# Patient Record
Sex: MALE | Race: OTHER | HISPANIC OR LATINO | URBAN - METROPOLITAN AREA
[De-identification: names, ages, dates, MRNs, and addresses within clinical notes are randomized per-mention and may not be internally consistent; named-entity substitution may affect disease eponyms.]

---

## 2022-05-04 ENCOUNTER — INPATIENT (INPATIENT)
Facility: HOSPITAL | Age: 72
LOS: 2 days | Discharge: HOME | DRG: 379 | End: 2022-05-07
Attending: INTERNAL MEDICINE | Admitting: INTERNAL MEDICINE
Payer: COMMERCIAL

## 2022-05-04 VITALS
SYSTOLIC BLOOD PRESSURE: 191 MMHG | WEIGHT: 145.51 LBS | DIASTOLIC BLOOD PRESSURE: 89 MMHG | RESPIRATION RATE: 17 BRPM | HEIGHT: 64 IN | TEMPERATURE: 98 F | HEART RATE: 68 BPM | OXYGEN SATURATION: 97 %

## 2022-05-04 DIAGNOSIS — K92.2 GASTROINTESTINAL HEMORRHAGE, UNSPECIFIED: ICD-10-CM

## 2022-05-04 DIAGNOSIS — Z29.9 ENCOUNTER FOR PROPHYLACTIC MEASURES, UNSPECIFIED: ICD-10-CM

## 2022-05-04 DIAGNOSIS — D50.9 IRON DEFICIENCY ANEMIA, UNSPECIFIED: ICD-10-CM

## 2022-05-04 DIAGNOSIS — Z90.49 ACQUIRED ABSENCE OF OTHER SPECIFIED PARTS OF DIGESTIVE TRACT: Chronic | ICD-10-CM

## 2022-05-04 DIAGNOSIS — I10 ESSENTIAL (PRIMARY) HYPERTENSION: ICD-10-CM

## 2022-05-04 DIAGNOSIS — Z90.3 ACQUIRED ABSENCE OF STOMACH [PART OF]: Chronic | ICD-10-CM

## 2022-05-04 DIAGNOSIS — Z98.0 INTESTINAL BYPASS AND ANASTOMOSIS STATUS: Chronic | ICD-10-CM

## 2022-05-04 LAB
ALBUMIN SERPL ELPH-MCNC: 3.5 G/DL — SIGNIFICANT CHANGE UP (ref 3.5–5)
ALP SERPL-CCNC: 95 U/L — SIGNIFICANT CHANGE UP (ref 40–120)
ALT FLD-CCNC: 22 U/L DA — SIGNIFICANT CHANGE UP (ref 10–60)
ANION GAP SERPL CALC-SCNC: 4 MMOL/L — LOW (ref 5–17)
AST SERPL-CCNC: 16 U/L — SIGNIFICANT CHANGE UP (ref 10–40)
BASOPHILS # BLD AUTO: 0.07 K/UL — SIGNIFICANT CHANGE UP (ref 0–0.2)
BASOPHILS NFR BLD AUTO: 1 % — SIGNIFICANT CHANGE UP (ref 0–2)
BILIRUB SERPL-MCNC: 0.2 MG/DL — SIGNIFICANT CHANGE UP (ref 0.2–1.2)
BLD GP AB SCN SERPL QL: SIGNIFICANT CHANGE UP
BUN SERPL-MCNC: 26 MG/DL — HIGH (ref 7–18)
CALCIUM SERPL-MCNC: 8.3 MG/DL — LOW (ref 8.4–10.5)
CHLORIDE SERPL-SCNC: 107 MMOL/L — SIGNIFICANT CHANGE UP (ref 96–108)
CO2 SERPL-SCNC: 29 MMOL/L — SIGNIFICANT CHANGE UP (ref 22–31)
CREAT SERPL-MCNC: 1.02 MG/DL — SIGNIFICANT CHANGE UP (ref 0.5–1.3)
EGFR: 79 ML/MIN/1.73M2 — SIGNIFICANT CHANGE UP
EOSINOPHIL # BLD AUTO: 0.11 K/UL — SIGNIFICANT CHANGE UP (ref 0–0.5)
EOSINOPHIL NFR BLD AUTO: 1.6 % — SIGNIFICANT CHANGE UP (ref 0–6)
GLUCOSE SERPL-MCNC: 117 MG/DL — HIGH (ref 70–99)
HCT VFR BLD CALC: 41.4 % — SIGNIFICANT CHANGE UP (ref 39–50)
HGB BLD-MCNC: 13.6 G/DL — SIGNIFICANT CHANGE UP (ref 13–17)
IMM GRANULOCYTES NFR BLD AUTO: 1.3 % — SIGNIFICANT CHANGE UP (ref 0–1.5)
LIDOCAIN IGE QN: 190 U/L — SIGNIFICANT CHANGE UP (ref 73–393)
LYMPHOCYTES # BLD AUTO: 1.39 K/UL — SIGNIFICANT CHANGE UP (ref 1–3.3)
LYMPHOCYTES # BLD AUTO: 20.3 % — SIGNIFICANT CHANGE UP (ref 13–44)
MCHC RBC-ENTMCNC: 27.6 PG — SIGNIFICANT CHANGE UP (ref 27–34)
MCHC RBC-ENTMCNC: 32.9 GM/DL — SIGNIFICANT CHANGE UP (ref 32–36)
MCV RBC AUTO: 84 FL — SIGNIFICANT CHANGE UP (ref 80–100)
MONOCYTES # BLD AUTO: 0.5 K/UL — SIGNIFICANT CHANGE UP (ref 0–0.9)
MONOCYTES NFR BLD AUTO: 7.3 % — SIGNIFICANT CHANGE UP (ref 2–14)
NEUTROPHILS # BLD AUTO: 4.69 K/UL — SIGNIFICANT CHANGE UP (ref 1.8–7.4)
NEUTROPHILS NFR BLD AUTO: 68.5 % — SIGNIFICANT CHANGE UP (ref 43–77)
NRBC # BLD: 0 /100 WBCS — SIGNIFICANT CHANGE UP (ref 0–0)
PLATELET # BLD AUTO: 235 K/UL — SIGNIFICANT CHANGE UP (ref 150–400)
POTASSIUM SERPL-MCNC: 4.1 MMOL/L — SIGNIFICANT CHANGE UP (ref 3.5–5.3)
POTASSIUM SERPL-SCNC: 4.1 MMOL/L — SIGNIFICANT CHANGE UP (ref 3.5–5.3)
PROT SERPL-MCNC: 7.1 G/DL — SIGNIFICANT CHANGE UP (ref 6–8.3)
RBC # BLD: 4.93 M/UL — SIGNIFICANT CHANGE UP (ref 4.2–5.8)
RBC # FLD: 19.1 % — HIGH (ref 10.3–14.5)
SARS-COV-2 RNA SPEC QL NAA+PROBE: SIGNIFICANT CHANGE UP
SODIUM SERPL-SCNC: 140 MMOL/L — SIGNIFICANT CHANGE UP (ref 135–145)
TROPONIN I, HIGH SENSITIVITY RESULT: 4.3 NG/L — SIGNIFICANT CHANGE UP
WBC # BLD: 6.85 K/UL — SIGNIFICANT CHANGE UP (ref 3.8–10.5)
WBC # FLD AUTO: 6.85 K/UL — SIGNIFICANT CHANGE UP (ref 3.8–10.5)

## 2022-05-04 PROCEDURE — 99285 EMERGENCY DEPT VISIT HI MDM: CPT

## 2022-05-04 PROCEDURE — 99222 1ST HOSP IP/OBS MODERATE 55: CPT

## 2022-05-04 RX ORDER — PANTOPRAZOLE SODIUM 20 MG/1
40 TABLET, DELAYED RELEASE ORAL DAILY
Refills: 0 | Status: DISCONTINUED | OUTPATIENT
Start: 2022-05-04 | End: 2022-05-05

## 2022-05-04 RX ORDER — FERROUS SULFATE 325(65) MG
325 TABLET ORAL DAILY
Refills: 0 | Status: DISCONTINUED | OUTPATIENT
Start: 2022-05-04 | End: 2022-05-07

## 2022-05-04 RX ORDER — ONDANSETRON 8 MG/1
8 TABLET, FILM COATED ORAL EVERY 8 HOURS
Refills: 0 | Status: DISCONTINUED | OUTPATIENT
Start: 2022-05-04 | End: 2022-05-07

## 2022-05-04 RX ORDER — SENNA PLUS 8.6 MG/1
2 TABLET ORAL AT BEDTIME
Refills: 0 | Status: DISCONTINUED | OUTPATIENT
Start: 2022-05-04 | End: 2022-05-07

## 2022-05-04 RX ORDER — POLYETHYLENE GLYCOL 3350 17 G/17G
17 POWDER, FOR SOLUTION ORAL DAILY
Refills: 0 | Status: DISCONTINUED | OUTPATIENT
Start: 2022-05-04 | End: 2022-05-07

## 2022-05-04 RX ADMIN — SENNA PLUS 2 TABLET(S): 8.6 TABLET ORAL at 22:06

## 2022-05-04 NOTE — CONSULT NOTE ADULT - ASSESSMENT
71M with PMHx of gastric ulcers s/p bilroth II gastrojejunostomy >41 y/o, hx of hospital admission for GI bleed ~10 years ago presenting with melena  h/h stable    -Recommend GI consultation  -PPI  -No acute surgical intervention warranted at this time  -Remainder of care per primary team

## 2022-05-04 NOTE — H&P ADULT - HISTORY OF PRESENT ILLNESS
Patient is a 70 y/o M w/ Hx of gastric ulcer (s/p partial gastrectomy and Bilroth II reconstruction in 1970s), VASILIY (received weekly IV Iron x 5), HTN (not on any meds), s/p appendectomy (1980s) and s/p hydrocele (1990s). He presented to the ED with melena x 4 days w/ assoc. nausea. He denies any vomiting, abdominal pain. He was recently seen by Gastroenterology (Dr. Srikanth Cooper) at [facility] last 4/25/22 for anemia work-up. EGD showed Arytenoid edema was found. Normal esophagus, small hiatal hernia. A fundoplication was found. The wrap appears loose. Patent Billroth II gastrectomy was found, characterized by erythema, friable mucosa a hemorrhage appearance, congestion, and edema. Treated with APC. Gastritis. Normal examined jejunum. cauterized. Colonoscopy showed Diverticulosis in the sigmoid colon and in the descending colon. He was subsequently admitted for GIB.

## 2022-05-04 NOTE — H&P ADULT - NSICDXPASTSURGICALHX_GEN_ALL_CORE_FT
PAST SURGICAL HISTORY:  History of appendectomy     History of Billroth II operation     History of partial gastrectomy

## 2022-05-04 NOTE — CONSULT NOTE ADULT - SUBJECTIVE AND OBJECTIVE BOX
[facility]    Patient is a 71y old  Male who presents with a chief complaint of melena.   HPI:  70 yo Male h/o Gastric Ulcer s/p partial gastrectomy and Billroth II reconstruction 45 yrs ago, Iron deficiency anemia on weekly IV iron infusions at [facility], new HTN not on anti-HTN meds,  who presented after  4 episodes of melena since last night. Pt also c/o nausea this AM. Pt recent had EGD for anemia 4/25 which showed edema and friable tissue to GJ anastamosis site. Pt was cauterized by GI Dr. Srikanth Cooper at the time. Pt relates h/o GI bleed requiring admission to [facility] 10 years ago. Denies any vomiting, fever, dizziness or weakness.  GI consulted for further evaluation of melena.     GI HPI:   Pt seen and examined in the ED.  Pt is Scottish speaking and prefers his daughter Michelle to translate . Pt endorses had black stool yesterday , then BRBPR this am. Pt endorses headache currently because he has not eaten since yesterday. Pt denies SOB, chest discomfort, N/V/D/C, abdominal discomfort, rectal discomfort.   EGD from 4/25/22:Impression:  Arytenoid edema was found. Normal esophagus, small hiatal hernia. A fundoplication was found. The wrap appears loose. Patent billroth II gastrectomy was found, characterized by erythema, friable mucosa a hemorrhage appearance, congestion, and edema. Treated with APC. Gastritis. Normal examined jejunum.   Colonoscopy 4/25/22: Impression: Diverticulosis in the sigmoid colon and in the descending colon. The examination was otherwise normal .    PMH/PSH:  PAST MEDICAL & SURGICAL HISTORY:  Gastric ulcer, s/p partial gastrectomy, Billroth II reconstruction 45 yrs ago.    HTN   s/p appendectomy 30 yrs ago         FH: no h/o cancer  FAMILY HISTORY:      Social History:   No ETOh, no smoking, no illicit drugs    MEDS:  MEDICATIONS  (STANDING):    MEDICATIONS  (PRN):    Allergies    No Known Allergies    Intolerances  ----------  ROS      CONSTITUTIONAL:  No weight loss, fever, chills, weakness or fatigue.  HEENT:  Eyes:  No visual loss, blurred vision, double vision or yellow sclerae. Ears, Nose, Throat:  No hearing loss, sneezing, congestion, runny nose or sore throat.  SKIN:  No rash or itching.  CARDIOVASCULAR:  No chest pain, chest pressure or chest discomfort. No palpitations or edema.  RESPIRATORY:  No shortness of breath, cough or sputum.  GASTROINTESTINAL:  SEE HPI  GENITOURINARY:  No dysuria, hematuria, urinary frequency  NEUROLOGICAL: transient  headache currently. No  dizziness, syncope, paralysis, ataxia, numbness or tingling in the extremities. No change in bowel or bladder control.  MUSCULOSKELETAL:  No muscle, back pain, joint pain or stiffness.  HEMATOLOGIC:  BRBPR ,   LYMPHATICS:  No enlarged nodes. No history of splenectomy.  PSYCHIATRIC:  No history of depression or anxiety.  ENDOCRINOLOGIC:  No reports of sweating, cold or heat intolerance. No polyuria or polydipsia.      ______________________________________________________________________  PHYSICAL EXAM:  T(C): 37 (05-04-22 @ 12:14), Max: 37 (05-04-22 @ 12:14)  HR: 60 (05-04-22 @ 12:14)  BP: 154/83 (05-04-22 @ 12:14)  RR: 17 (05-04-22 @ 12:14)  SpO2: 97% (05-04-22 @ 12:14)  Wt(kg): --      GEN: NAD, normocephalic  CVS: S1S2+  CHEST: clear to auscultation  ABD: soft , nontender, nondistended, bowel sounds present  EXTR: no cyanosis, no clubbing, no edema  NEURO: Awake and alert; oriented x3  SKIN:  warm;  non icteric    ______________________________________________________________________  LABS:                        13.6   6.85  )-----------( 235      ( 04 May 2022 09:39 )             41.4     05-04    140  |  107  |  26<H>  ----------------------------<  117<H>  4.1   |  29  |  1.02    Ca    8.3<L>      04 May 2022 09:39    TPro  7.1  /  Alb  3.5  /  TBili  0.2  /  DBili  x   /  AST  16  /  ALT  22  /  AlkPhos  95  05-04    LIVER FUNCTIONS - ( 04 May 2022 09:39 )  Alb: 3.5 g/dL / Pro: 7.1 g/dL / ALK PHOS: 95 U/L / ALT: 22 U/L DA / AST: 16 U/L / GGT: x             ____________________________________________    IMAGING:

## 2022-05-04 NOTE — H&P ADULT - PROBLEM SELECTOR PLAN 2
Hx of VASILIY  given 5 doses of weekly IV iron  Hgb/Hct on admission - 13.6/41.4  no signs of active bleeding  monitor CBC  Start FeSO4 tab daily  f/u FOBT  f/u anemia panel

## 2022-05-04 NOTE — CONSULT NOTE ADULT - NS ATTEND AMEND GEN_ALL_CORE FT
- Melena, GI bleed.  - History of billroth II.    Patient seen and examined. In short, pt with remote hx of billroth II for PUD, recent EGD with friable oozing GJ anastomosis, treated with APC now presenting with melena, multiple episodes starting yesterday. No anemia however unclear baseline Hb. Will plan tentatively for EGD tomorrow to evaluate. PPI IV BID. Regular diet ok today, NPO after midnight.

## 2022-05-04 NOTE — ED PROVIDER NOTE - CLINICAL SUMMARY MEDICAL DECISION MAKING FREE TEXT BOX
70 yo M h/o Partial Gastrectomy and Billroth II here for melena. Pt clinically stable. Will send labs to assess for anemia and contact pt outpatient GI.

## 2022-05-04 NOTE — ED ADULT TRIAGE NOTE - CHIEF COMPLAINT QUOTE
Rectal bleeding ,bloody stool since yesterday ,h/o stomach surgery(About 70% stomach removed at the time)in 1970s

## 2022-05-04 NOTE — H&P ADULT - PROBLEM SELECTOR PLAN 1
p/w melena x 4 days  Hx of gastric ulcer (s/p partial gastrectomy, Billroth II reconstruction)  EGD (4/25) - edema, friable tissue at GI anastomosis, cauterized  Colon (4/25) - diverticulosis, sigmoid  start on clear liquids  Protonix IV OD  f/u FOBT  Gastro (Dr. Aragon) consulted  Surgery consulted - no acute intervention p/w melena x 4 days  Hx of gastric ulcer (s/p partial gastrectomy, Billroth II reconstruction)  EGD (4/25) - edema, friable tissue at GI anastomosis, cauterized  Colon (4/25) - diverticulosis, sigmoid  start on clear liquids  Protonix IV OD  f/u FOBT  for EGD daysi (5/5/22)  Gastro (Dr. Aragon) consulted  Surgery consulted - no acute intervention

## 2022-05-04 NOTE — H&P ADULT - NSHPREVIEWOFSYSTEMS_GEN_ALL_CORE
- CONSTITUTIONAL: Denies fever and chills  - HEENT: Denies changes in vision and hearing.  - RESPIRATORY: Denies SOB and cough.  - CV: Denies chest pain and palpitations  - GI: (+) melena. Denies abdominal pain, nausea, vomiting and diarrhea.  - : Denies dysuria and urinary frequency.  - SKIN: Denies rash and pruritus.  - NEUROLOGICAL: Denies headache and syncope.  - PSYCHIATRIC: Denies recent changes in mood. Denies anxiety and depression.

## 2022-05-04 NOTE — ED ADULT NURSE NOTE - PAIN RATING/NUMBER SCALE (0-10): REST
PT FELL YESTERDAY AND WAS SEEN HERE. THE FAMILY BROUGHT HIM IN FOR RE EVAL OF THE L EYE WHICH IS MORE SWOLLEN AND BRUISED. PT IS ACTING NORMAL AND NO VOMITING.
0

## 2022-05-04 NOTE — ED PROVIDER NOTE - OBJECTIVE STATEMENT
70 yo Male h/o Gastric Ulcer s/p partial gastrectomy and Billroth II reconstruction here for 4 episodes of melena since last night. Pt also c/o nausea this AM. Pt recent had EGD for anemia 4/25 which showed edema and friable tissue to GJ anastamosis site. Pt was cauterized by GI Dr. Srikanth Cooper at the time. Pt relates h/o GI bleed requiring admission to [facility] 10 years ago. Denies any vomiting, fever, dizziness or weakness.

## 2022-05-04 NOTE — H&P ADULT - NSHPPHYSICALEXAM_GEN_ALL_CORE
Vital Signs  · Temp - 98.4F (36.9C)  · Heart Rate- 68  · BP - 191/89  · Respiration Rate - 17  · SpO2 (%) - 97    PHYSICAL EXAM:  GENERAL: NAD, speaks in full sentences, no signs of respiratory distress  HEAD:  Atraumatic, Normocephalic  EYES: EOMI, PERRLA, conjunctiva and sclera clear  NECK: Supple, No JVD  CHEST/LUNG: Clear to auscultation bilaterally; No wheeze; No crackles; No accessory muscles used  HEART: Regular rate and rhythm; No murmurs;   ABDOMEN: Soft, Nontender, Nondistended; Bowel sounds present; No guarding  EXTREMITIES:  2+ Peripheral Pulses, No cyanosis or edema  PSYCH: AAOx3  NEUROLOGY: non-focal  SKIN: No rashes or lesions

## 2022-05-04 NOTE — CONSULT NOTE ADULT - PROBLEM SELECTOR RECOMMENDATION 9
Hgb >13  C/w PPI  Clear liquid today  Avoid NSAIDs  Monitor CBC and transfuse for Hgb <7.0 Hgb >13  C/w PPI  Clear liquid today  EGD tomorrow  NPO after midnight  COVID test within 72 hrs.   Avoid NSAIDs  Monitor CBC and transfuse for Hgb <7.0

## 2022-05-04 NOTE — PATIENT PROFILE ADULT - FUNCTIONAL ASSESSMENT - DAILY ACTIVITY ASSESSMENT TYPE
Patient is scheduledÂ with Dr. Bogdan Elaine on 12/10Â at 10am arrive at [address] [facility], here at [facility] on the 1st floor at Same Day Interventional Service. Patient expressed understanding and all scheduling questions answered. Â   Â   Pre-procedural Clearance;  [place]. Neha [place] [facility] [place]: [phone]    COVID Testin/8 at 10:40am  Location: [address] ([address] [place] [address]) Admission

## 2022-05-04 NOTE — H&P ADULT - ASSESSMENT
Patient is a 70 y/o M w/ Hx of gastric ulcer (s/p partial gastrectomy and Bilroth II reconstruction in 1970s), VASILIY (received weekly IV Iron x 5), HTN (not on any meds), s/p appendectomy (1980s) and s/p hydrocele (1990s). Admitted for Gastrointestinal Bleeding.

## 2022-05-04 NOTE — CONSULT NOTE ADULT - ASSESSMENT
70 yo Male h/o Gastric Ulcer s/p partial gastrectomy and Billroth II reconstruction 45 yrs ago, Iron deficiency anemia on weekly IV iron infusions at [facility], new HTN not on anti-HTN meds,  who presented after  4 episodes of melena .

## 2022-05-04 NOTE — CONSULT NOTE ADULT - SUBJECTIVE AND OBJECTIVE BOX
72 yo Male h/o Gastric Ulcer s/p partial gastrectomy and Billroth II reconstruction here for 4 episodes of melena since last night. Pt also c/o nausea this AM. Pt recent had EGD for anemia 4/25 which showed edema and friable tissue to GJ anastamosis site. Pt was cauterized by GI Dr. Srikanth Cooper at the time. Pt relates h/o GI bleed requiring admission to [facility] 10 years ago. Denies any vomiting, fever, dizziness or weakness.    SURGICAL CONSULTATION:  Pt seen and examined at bedside for surgical consult. Pt with hx of gastric ulcers s/p Billroth II gastrojejunostomy >40 years ago. Presents with melena x 1 day. He states his last bowel movement was dark, with minimal streaks of red blood.  Pt states last episode of gastric bleeding was 10 years ago. He states he has been receiving iron infusions due to anemia. He recently had EGD and colonoscopy done with Dr. Duke EGD showed erythema and friable tissue at the anastomosis site, hx of fundoplication and colonoscopy was incomplete however consistent with diverticulosis. Revision of Billroth surgery was recommended if persistent bleeding occurred. h/h stable in ED.   Pt offered no other acute complaints.     PAST MEDICAL & SURGICAL HISTORY:  No pertinent past medical history    Allergies  No Known Allergies  Intolerances    Vital Signs Last 24 Hrs  T(C): 37 (04 May 2022 12:14), Max: 37 (04 May 2022 12:14)  T(F): 98.6 (04 May 2022 12:14), Max: 98.6 (04 May 2022 12:14)  HR: 60 (04 May 2022 12:14) (60 - 68)  BP: 154/83 (04 May 2022 12:14) (154/83 - 191/89)  RR: 17 (04 May 2022 12:14) (17 - 17)  SpO2: 97% (04 May 2022 12:14) (97% - 97%)    Physical:  Gen: A&Ox3. NAD. Irish speaking  Chest: respiration unlabored  Abd: Soft ND, NT. Ex-lap scar and multiple other small scars noted from prior surgeries  Rectal: good sphincter tone, no external hemorrhoids. No masses or tenderness. Trace soft brown stool present in vault. No blood.     LABS:                        13.6   6.85  )-----------( 235      ( 04 May 2022 09:39 )             41.4              05-04    140  |  107  |  26<H>  ----------------------------<  117<H>  4.1   |  29  |  1.02    Ca    8.3<L>      04 May 2022 09:39    TPro  7.1  /  Alb  3.5  /  TBili  0.2  /  DBili  x   /  AST  16  /  ALT  22  /  AlkPhos  95  05-04

## 2022-05-05 DIAGNOSIS — B19.20 UNSPECIFIED VIRAL HEPATITIS C WITHOUT HEPATIC COMA: ICD-10-CM

## 2022-05-05 LAB
ALBUMIN SERPL ELPH-MCNC: 3.6 G/DL — SIGNIFICANT CHANGE UP (ref 3.5–5)
ALP SERPL-CCNC: 93 U/L — SIGNIFICANT CHANGE UP (ref 40–120)
ALT FLD-CCNC: 22 U/L DA — SIGNIFICANT CHANGE UP (ref 10–60)
ANION GAP SERPL CALC-SCNC: 4 MMOL/L — LOW (ref 5–17)
AST SERPL-CCNC: 16 U/L — SIGNIFICANT CHANGE UP (ref 10–40)
BASOPHILS # BLD AUTO: 0.04 K/UL — SIGNIFICANT CHANGE UP (ref 0–0.2)
BASOPHILS NFR BLD AUTO: 0.7 % — SIGNIFICANT CHANGE UP (ref 0–2)
BILIRUB SERPL-MCNC: 0.4 MG/DL — SIGNIFICANT CHANGE UP (ref 0.2–1.2)
BUN SERPL-MCNC: 24 MG/DL — HIGH (ref 7–18)
CALCIUM SERPL-MCNC: 9.2 MG/DL — SIGNIFICANT CHANGE UP (ref 8.4–10.5)
CHLORIDE SERPL-SCNC: 106 MMOL/L — SIGNIFICANT CHANGE UP (ref 96–108)
CO2 SERPL-SCNC: 32 MMOL/L — HIGH (ref 22–31)
CREAT SERPL-MCNC: 1 MG/DL — SIGNIFICANT CHANGE UP (ref 0.5–1.3)
EGFR: 80 ML/MIN/1.73M2 — SIGNIFICANT CHANGE UP
EOSINOPHIL # BLD AUTO: 0.15 K/UL — SIGNIFICANT CHANGE UP (ref 0–0.5)
EOSINOPHIL NFR BLD AUTO: 2.6 % — SIGNIFICANT CHANGE UP (ref 0–6)
FERRITIN SERPL-MCNC: 265 NG/ML — SIGNIFICANT CHANGE UP (ref 30–400)
GLUCOSE SERPL-MCNC: 110 MG/DL — HIGH (ref 70–99)
HAPTOGLOB SERPL-MCNC: 138 MG/DL — SIGNIFICANT CHANGE UP (ref 34–200)
HCT VFR BLD CALC: 41.4 % — SIGNIFICANT CHANGE UP (ref 39–50)
HCV AB S/CO SERPL IA: 16.03 S/CO — HIGH (ref 0–0.99)
HCV AB SERPL-IMP: REACTIVE
HGB BLD-MCNC: 13.5 G/DL — SIGNIFICANT CHANGE UP (ref 13–17)
IMM GRANULOCYTES NFR BLD AUTO: 1.4 % — SIGNIFICANT CHANGE UP (ref 0–1.5)
IRON SATN MFR SERPL: 124 UG/DL — SIGNIFICANT CHANGE UP (ref 65–170)
IRON SATN MFR SERPL: 36 % — SIGNIFICANT CHANGE UP (ref 20–55)
LDH SERPL L TO P-CCNC: 163 U/L — SIGNIFICANT CHANGE UP (ref 120–225)
LYMPHOCYTES # BLD AUTO: 1.12 K/UL — SIGNIFICANT CHANGE UP (ref 1–3.3)
LYMPHOCYTES # BLD AUTO: 19.6 % — SIGNIFICANT CHANGE UP (ref 13–44)
MAGNESIUM SERPL-MCNC: 2.7 MG/DL — HIGH (ref 1.6–2.6)
MCHC RBC-ENTMCNC: 27.8 PG — SIGNIFICANT CHANGE UP (ref 27–34)
MCHC RBC-ENTMCNC: 32.6 GM/DL — SIGNIFICANT CHANGE UP (ref 32–36)
MCV RBC AUTO: 85.2 FL — SIGNIFICANT CHANGE UP (ref 80–100)
MONOCYTES # BLD AUTO: 0.5 K/UL — SIGNIFICANT CHANGE UP (ref 0–0.9)
MONOCYTES NFR BLD AUTO: 8.8 % — SIGNIFICANT CHANGE UP (ref 2–14)
NEUTROPHILS # BLD AUTO: 3.82 K/UL — SIGNIFICANT CHANGE UP (ref 1.8–7.4)
NEUTROPHILS NFR BLD AUTO: 66.9 % — SIGNIFICANT CHANGE UP (ref 43–77)
NRBC # BLD: 0 /100 WBCS — SIGNIFICANT CHANGE UP (ref 0–0)
OB PNL STL: POSITIVE
PHOSPHATE SERPL-MCNC: 2.9 MG/DL — SIGNIFICANT CHANGE UP (ref 2.5–4.5)
PLATELET # BLD AUTO: 257 K/UL — SIGNIFICANT CHANGE UP (ref 150–400)
POTASSIUM SERPL-MCNC: 4.7 MMOL/L — SIGNIFICANT CHANGE UP (ref 3.5–5.3)
POTASSIUM SERPL-SCNC: 4.7 MMOL/L — SIGNIFICANT CHANGE UP (ref 3.5–5.3)
PROT SERPL-MCNC: 7 G/DL — SIGNIFICANT CHANGE UP (ref 6–8.3)
RBC # BLD: 4.86 M/UL — SIGNIFICANT CHANGE UP (ref 4.2–5.8)
RBC # BLD: 4.86 M/UL — SIGNIFICANT CHANGE UP (ref 4.2–5.8)
RBC # FLD: 19.5 % — HIGH (ref 10.3–14.5)
RETICS #: 99.6 K/UL — SIGNIFICANT CHANGE UP (ref 25–125)
RETICS/RBC NFR: 2.1 % — SIGNIFICANT CHANGE UP (ref 0.5–2.5)
SODIUM SERPL-SCNC: 142 MMOL/L — SIGNIFICANT CHANGE UP (ref 135–145)
TIBC SERPL-MCNC: 348 UG/DL — SIGNIFICANT CHANGE UP (ref 250–450)
UIBC SERPL-MCNC: 224 UG/DL — SIGNIFICANT CHANGE UP (ref 110–370)
WBC # BLD: 5.71 K/UL — SIGNIFICANT CHANGE UP (ref 3.8–10.5)
WBC # FLD AUTO: 5.71 K/UL — SIGNIFICANT CHANGE UP (ref 3.8–10.5)

## 2022-05-05 PROCEDURE — 76705 ECHO EXAM OF ABDOMEN: CPT | Mod: 26

## 2022-05-05 PROCEDURE — 43255 EGD CONTROL BLEEDING ANY: CPT

## 2022-05-05 DEVICE — CATH ESOPH DIL 9 ATM 6FR 8-10MM: Type: IMPLANTABLE DEVICE | Status: FUNCTIONAL

## 2022-05-05 DEVICE — CATH ESOPH DIL 8 ATM 6FR10-12M: Type: IMPLANTABLE DEVICE | Status: FUNCTIONAL

## 2022-05-05 DEVICE — CLIP RESOLUTION 360 235CM: Type: IMPLANTABLE DEVICE | Status: FUNCTIONAL

## 2022-05-05 DEVICE — CATH BALLOON DIL 6-8MM: Type: IMPLANTABLE DEVICE | Status: FUNCTIONAL

## 2022-05-05 RX ORDER — SUCRALFATE 1 G
1 TABLET ORAL
Refills: 0 | Status: DISCONTINUED | OUTPATIENT
Start: 2022-05-05 | End: 2022-05-07

## 2022-05-05 RX ORDER — PANTOPRAZOLE SODIUM 20 MG/1
40 TABLET, DELAYED RELEASE ORAL
Refills: 0 | Status: DISCONTINUED | OUTPATIENT
Start: 2022-05-05 | End: 2022-05-07

## 2022-05-05 RX ADMIN — Medication 1 GRAM(S): at 19:34

## 2022-05-05 RX ADMIN — PANTOPRAZOLE SODIUM 40 MILLIGRAM(S): 20 TABLET, DELAYED RELEASE ORAL at 19:33

## 2022-05-05 RX ADMIN — PANTOPRAZOLE SODIUM 40 MILLIGRAM(S): 20 TABLET, DELAYED RELEASE ORAL at 13:10

## 2022-05-05 RX ADMIN — Medication 1 GRAM(S): at 22:32

## 2022-05-05 NOTE — PROGRESS NOTE ADULT - PROBLEM SELECTOR PLAN 4
SCD for dvt ppx due to GIB p/w elevated BP - 191/89  no meds  started on Lisinopril 10 mg OD.  monitor BP

## 2022-05-05 NOTE — PROGRESS NOTE ADULT - PROBLEM SELECTOR PLAN 2
Pt with Hx of VASILIY  given 5 doses of weekly IV iron  Hgb/Hct on admission - 13.6/41.4  no signs of active bleeding  monitor CBC  Start FeSO4 tab daily  f/u FOBT  f/u anemia panel. Pt tested positive for Hep C S/CO ratio and Hep C virus interpretation  f/u hepatic/pancreatic US  LFT normal  Hepatology dr. Curry consulted.

## 2022-05-05 NOTE — PROGRESS NOTE ADULT - SUBJECTIVE AND OBJECTIVE BOX
NP Note discussed with Primary Attending.    Patient is a 71y old  Male who presents with a chief complaint of GI bleeding (04 May 2022 17:46)      INTERVAL HPI/OVERNIGHT EVENTS: no new complaints    MEDICATIONS  (STANDING):  ferrous    sulfate 325 milliGRAM(s) Oral daily  pantoprazole  Injectable 40 milliGRAM(s) IV Push daily  polyethylene glycol 3350 17 Gram(s) Oral daily  senna 2 Tablet(s) Oral at bedtime    MEDICATIONS  (PRN):  aluminum hydroxide/magnesium hydroxide/simethicone Suspension 30 milliLiter(s) Oral every 4 hours PRN Dyspepsia  ondansetron    Tablet 8 milliGRAM(s) Oral every 8 hours PRN Nausea and/or Vomiting      __________________________________________________  REVIEW OF SYSTEMS:    CONSTITUTIONAL: No fever,   EYES: no acute visual disturbances  NECK: No pain or stiffness  RESPIRATORY: No cough; No shortness of breath  CARDIOVASCULAR: No chest pain, no palpitations  GASTROINTESTINAL: No pain. No nausea or vomiting; No diarrhea   NEUROLOGICAL: No headache or numbness, no tremors  MUSCULOSKELETAL: No joint pain, no muscle pain  GENITOURINARY: no dysuria, no frequency, no hesitancy  PSYCHIATRY: no depression , no anxiety  ALL OTHER  ROS negative        Vital Signs Last 24 Hrs  T(C): 36.9 (05 May 2022 13:25), Max: 36.9 (04 May 2022 21:39)  T(F): 98.4 (05 May 2022 13:25), Max: 98.4 (04 May 2022 21:39)  HR: 62 (05 May 2022 13:25) (58 - 62)  BP: 117/69 (05 May 2022 13:25) (117/69 - 162/88)  BP(mean): --  RR: 18 (05 May 2022 13:25) (17 - 18)  SpO2: 98% (05 May 2022 13:25) (95% - 99%)    ________________________________________________  PHYSICAL EXAM:  GENERAL: NAD  HEENT: Normocephalic;  conjunctivae and sclerae clear; moist mucous membranes;   NECK : supple  CHEST/LUNG: Clear to auscultation bilaterally with good air entry   HEART: S1 S2  regular; no murmurs, gallops or rubs  ABDOMEN: Soft, Nontender, Nondistended; Bowel sounds present  EXTREMITIES: no cyanosis; no edema; no calf tenderness  SKIN: warm and dry; no rash  NERVOUS SYSTEM:  Awake and alert; Oriented  to place, person and time ; no new deficits    _________________________________________________  LABS:                        13.5   5.71  )-----------( 257      ( 05 May 2022 06:17 )             41.4     05-05    142  |  106  |  24<H>  ----------------------------<  110<H>  4.7   |  32<H>  |  1.00    Ca    9.2      05 May 2022 06:17  Phos  2.9     05-05  Mg     2.7     05-05    TPro  7.0  /  Alb  3.6  /  TBili  0.4  /  DBili  x   /  AST  16  /  ALT  22  /  AlkPhos  93  05-05        CAPILLARY BLOOD GLUCOSE            RADIOLOGY & ADDITIONAL TESTS:    Imaging  Reviewed:  YES/NO    Consultant(s) Notes Reviewed:   YES/ No      Plan of care was discussed with patient and /or primary care giver; all questions and concerns were addressed

## 2022-05-05 NOTE — PROGRESS NOTE ADULT - PROBLEM SELECTOR PLAN 3
p/w elevated BP - 191/89  no meds  started on Lisinopril 10 mg OD.  monitor BP Pt with Hx of VASILIY  given 5 doses of weekly IV iron  Hgb/Hct on admission - 13.6/41.4  no signs of active bleeding  monitor CBC  Start FeSO4 tab daily  f/u FOBT  f/u anemia panel.

## 2022-05-06 LAB
ALBUMIN SERPL ELPH-MCNC: 3.1 G/DL — LOW (ref 3.5–5)
ALP SERPL-CCNC: 90 U/L — SIGNIFICANT CHANGE UP (ref 40–120)
ALT FLD-CCNC: 19 U/L DA — SIGNIFICANT CHANGE UP (ref 10–60)
ANION GAP SERPL CALC-SCNC: 4 MMOL/L — LOW (ref 5–17)
APTT BLD: 29.5 SEC — SIGNIFICANT CHANGE UP (ref 27.5–35.5)
AST SERPL-CCNC: 12 U/L — SIGNIFICANT CHANGE UP (ref 10–40)
BILIRUB SERPL-MCNC: 0.4 MG/DL — SIGNIFICANT CHANGE UP (ref 0.2–1.2)
BUN SERPL-MCNC: 24 MG/DL — HIGH (ref 7–18)
CALCIUM SERPL-MCNC: 8.4 MG/DL — SIGNIFICANT CHANGE UP (ref 8.4–10.5)
CHLORIDE SERPL-SCNC: 109 MMOL/L — HIGH (ref 96–108)
CO2 SERPL-SCNC: 29 MMOL/L — SIGNIFICANT CHANGE UP (ref 22–31)
CREAT SERPL-MCNC: 0.98 MG/DL — SIGNIFICANT CHANGE UP (ref 0.5–1.3)
EGFR: 82 ML/MIN/1.73M2 — SIGNIFICANT CHANGE UP
GLUCOSE SERPL-MCNC: 101 MG/DL — HIGH (ref 70–99)
HAV IGG SER QL IA: REACTIVE
HBV CORE AB SER-ACNC: SIGNIFICANT CHANGE UP
HBV SURFACE AB SER-ACNC: SIGNIFICANT CHANGE UP
HCT VFR BLD CALC: 37.2 % — LOW (ref 39–50)
HCT VFR BLD CALC: 37.8 % — LOW (ref 39–50)
HCV RNA FLD QL NAA+PROBE: SIGNIFICANT CHANGE UP
HCV RNA FLD QL NAA+PROBE: SIGNIFICANT CHANGE UP
HCV RNA SPEC NAA+PROBE-LOG IU: SIGNIFICANT CHANGE UP
HCV RNA SPEC NAA+PROBE-LOG IU: SIGNIFICANT CHANGE UP LOGIU/ML
HCV RNA SPEC QL PROBE+SIG AMP: SIGNIFICANT CHANGE UP
HCV RNA SPEC QL PROBE+SIG AMP: SIGNIFICANT CHANGE UP
HGB BLD-MCNC: 12.2 G/DL — LOW (ref 13–17)
HGB BLD-MCNC: 12.5 G/DL — LOW (ref 13–17)
HIV 1+2 AB+HIV1 P24 AG SERPL QL IA: SIGNIFICANT CHANGE UP
INR BLD: 1.1 RATIO — SIGNIFICANT CHANGE UP (ref 0.88–1.16)
MCHC RBC-ENTMCNC: 27.3 PG — SIGNIFICANT CHANGE UP (ref 27–34)
MCHC RBC-ENTMCNC: 28.1 PG — SIGNIFICANT CHANGE UP (ref 27–34)
MCHC RBC-ENTMCNC: 32.3 GM/DL — SIGNIFICANT CHANGE UP (ref 32–36)
MCHC RBC-ENTMCNC: 33.6 GM/DL — SIGNIFICANT CHANGE UP (ref 32–36)
MCV RBC AUTO: 83.6 FL — SIGNIFICANT CHANGE UP (ref 80–100)
MCV RBC AUTO: 84.6 FL — SIGNIFICANT CHANGE UP (ref 80–100)
MELD SCORE WITH DIALYSIS: 21 POINTS — SIGNIFICANT CHANGE UP
MELD SCORE WITHOUT DIALYSIS: 7 POINTS — SIGNIFICANT CHANGE UP
NRBC # BLD: 0 /100 WBCS — SIGNIFICANT CHANGE UP (ref 0–0)
NRBC # BLD: 0 /100 WBCS — SIGNIFICANT CHANGE UP (ref 0–0)
PLATELET # BLD AUTO: 227 K/UL — SIGNIFICANT CHANGE UP (ref 150–400)
PLATELET # BLD AUTO: 238 K/UL — SIGNIFICANT CHANGE UP (ref 150–400)
POTASSIUM SERPL-MCNC: 4.2 MMOL/L — SIGNIFICANT CHANGE UP (ref 3.5–5.3)
POTASSIUM SERPL-SCNC: 4.2 MMOL/L — SIGNIFICANT CHANGE UP (ref 3.5–5.3)
PROT SERPL-MCNC: 6.5 G/DL — SIGNIFICANT CHANGE UP (ref 6–8.3)
PROTHROM AB SERPL-ACNC: 13.1 SEC — SIGNIFICANT CHANGE UP (ref 10.5–13.4)
RBC # BLD: 4.45 M/UL — SIGNIFICANT CHANGE UP (ref 4.2–5.8)
RBC # BLD: 4.47 M/UL — SIGNIFICANT CHANGE UP (ref 4.2–5.8)
RBC # FLD: 19 % — HIGH (ref 10.3–14.5)
RBC # FLD: 19.1 % — HIGH (ref 10.3–14.5)
SODIUM SERPL-SCNC: 142 MMOL/L — SIGNIFICANT CHANGE UP (ref 135–145)
WBC # BLD: 5.33 K/UL — SIGNIFICANT CHANGE UP (ref 3.8–10.5)
WBC # BLD: 6.47 K/UL — SIGNIFICANT CHANGE UP (ref 3.8–10.5)
WBC # FLD AUTO: 5.33 K/UL — SIGNIFICANT CHANGE UP (ref 3.8–10.5)
WBC # FLD AUTO: 6.47 K/UL — SIGNIFICANT CHANGE UP (ref 3.8–10.5)

## 2022-05-06 PROCEDURE — 99232 SBSQ HOSP IP/OBS MODERATE 35: CPT

## 2022-05-06 PROCEDURE — 99222 1ST HOSP IP/OBS MODERATE 55: CPT

## 2022-05-06 RX ADMIN — Medication 1 GRAM(S): at 12:12

## 2022-05-06 RX ADMIN — POLYETHYLENE GLYCOL 3350 17 GRAM(S): 17 POWDER, FOR SOLUTION ORAL at 12:12

## 2022-05-06 RX ADMIN — PANTOPRAZOLE SODIUM 40 MILLIGRAM(S): 20 TABLET, DELAYED RELEASE ORAL at 18:18

## 2022-05-06 RX ADMIN — Medication 325 MILLIGRAM(S): at 12:12

## 2022-05-06 RX ADMIN — Medication 1 GRAM(S): at 18:17

## 2022-05-06 RX ADMIN — Medication 1 GRAM(S): at 07:02

## 2022-05-06 RX ADMIN — PANTOPRAZOLE SODIUM 40 MILLIGRAM(S): 20 TABLET, DELAYED RELEASE ORAL at 07:02

## 2022-05-06 RX ADMIN — Medication 1 GRAM(S): at 23:48

## 2022-05-06 NOTE — PROGRESS NOTE ADULT - NUTRITIONAL ASSESSMENT
Large gastrojejunal anastomotic ulcer with flat pigmented spots vs visiblevessel. Scattered gastric angiodysplasias. Complete hemostasis achieved with three clips and APC as above.         Plan: Return to floor for further management     May resume regular diet    Recommend Carafate slurry 1g three times daily  Recommend Pantoprazole 40mg twice daily  Monitor for any signs of recurring bleeding

## 2022-05-06 NOTE — PROGRESS NOTE ADULT - NS ATTEND AMEND GEN_ALL_CORE FT
- Anemia.  - Gastrojejunal ulcer.  - Gastric AVMs.    Patient seen and examined. Doing well. One episode of melena, suspect residual. Hb with slight downtrend. Continue carafate QID. Advance diet as tolerated. PPI BID. Continue to monitor.

## 2022-05-06 NOTE — PROGRESS NOTE ADULT - PROBLEM SELECTOR PLAN 2
Hepatitis labs ordered per Hepatology recs  Pt to follow up as outpatient for results, further work up and treatment  Dr. Curry, Hepatology, following

## 2022-05-06 NOTE — PROGRESS NOTE ADULT - ASSESSMENT
Patient is a 70 y/o M w/ Hx of gastric ulcer (s/p partial gastrectomy and Bilroth II reconstruction in 1970s), VASILIY (received weekly IV Iron x 5), HTN (not on any meds), s/p appendectomy (1980s) and s/p hydrocele (1990s). He presented to the ED with melena x 4 days w/ assoc. nausea. He was admitted for GIB, GI and surgery consulted. Patient started on Iron and GI ppx, EGD scheduled for today. Patient tested positive for Hep C, hepatology dr. Curry consulted, Hepatic US and hep panel ordered.

## 2022-05-06 NOTE — CONSULT NOTE ADULT - ASSESSMENT
71y Male with Hx of gastric ulcer (s/p partial gastrectomy and Bilroth II reconstruction in 1970s), VASILIY (received weekly IV Iron x 5), HTN (not on any meds), s/p appendectomy (1980s) and s/p hydrocele (1990s), presented to [facility] ED on 5/4/22 with melena x 4 days w/ assoc. nausea but denied vomiting, abdominal pain and was admitted with GIB and underwent EGD, showing large gastrojejunal ulcer with flat pigmented spots vs. visible vessel, scattered gastric angiodysplasias, s/p 3 clips and APC.   Hepatology was consulted b/o pos Hep C ab.     Hepatitis C ab  - Please, obtain HCV RNA PCR VL, HCV genotype, Hep C Fibrosure, HBsAg, HBcAb, HBsAB, HBcAb IgM, Hep A IgG, HIV  - Can have elastography outpatient.  - He will need to follow up outpatient in Liver clinic for treatment if Hep C RNA is pos. If Hep C RNA neg, can suggest past infection or false positive result.   - US abd was RUQ US, please, obtain complete to assess spleen size as well. US suggest echogenic liver.     GI bleed  - Mgmt. per GI    Thank you for consult  Will continue to follow. Hepatology returns Monday, 5/9/22. Please, consult GI on call if change in status, questions, concerns.  D/w primary team.   71y Male with Hx of gastric ulcer (s/p partial gastrectomy and Bilroth II reconstruction in 1970s), VASILIY (received weekly IV Iron x 5), HTN (not on any meds), s/p appendectomy (1980s) and s/p hydrocele (1990s), [facility] on 5/4/22 with melena x 4 days w/ assoc. nausea but denied vomiting, abdominal pain and was admitted with GIB and underwent EGD, showing large gastrojejunal ulcer with flat pigmented spots vs. visible vessel, scattered gastric angiodysplasias, s/p 3 clips and APC.   Hepatology was consulted b/o pos Hep C ab.     Hepatitis C ab pos  - Patient reports Hep C treatment about 2 years ago with one of the DAA agents and reports that "was cleared". It suggest SVR, and the antibody will remain positive life long. Discussed that despite having the antibody, he can be reinfected if has at risk behaviour. He denies risk factors, likely he acquired in 1970s when received blood transfusion.   - Please, obtain HCV RNA to confirm SVR  - Please, obtain HBsAg, HBcAb, HBsAB, HBcAb IgM, Hep A IgG, HIV  - If Hep C RNA pos, obtain Hep C PCR VL, HCV genotype  - Can have elastography outpatient. Please, obtain Hep C Fibrosure. No stigmata of CLD on physical exam.   -  IF Hep C RNA is pos, he will need to follow up outpatient in Liver clinic  or with his GI doctor for treatment  - US abd was RUQ US, please, obtain complete to assess spleen size as well. US suggest echogenic liver.   - Discussed with patient that he will need to c/w q6 months HCC screening (also depends on pretreatment fibrosis status). Discussed that SVR likely  reduces risk of liver cancer, but not zero and will need continued surveillance.    GI bleed  - Mgmt. per GI    Thank you for consult  Will continue to follow. Hepatology returns Monday, 5/9/22. Please, consult GI on call if change in status, questions, concerns. If patient gets discharged, he needs to follow either with his GI/hepatologist or also can follow at our liver clinic at [address] [place] [address]. Cristobal Owen, 3rd floor. Tel .  D/w primary team.

## 2022-05-06 NOTE — PROGRESS NOTE ADULT - SUBJECTIVE AND OBJECTIVE BOX
INTERVAL HPI/OVERNIGHT EVENTS:  Patient seen,events noticed,s/p egd  VITAL SIGNS:  T(F): 98.7 (05-06-22 @ 05:22)  HR: 65 (05-06-22 @ 05:22)  BP: 123/75 (05-06-22 @ 05:22)  RR: 18 (05-06-22 @ 05:22)  SpO2: 96% (05-06-22 @ 05:22)  Wt(kg): --    PHYSICAL EXAM:  awake  Constitutional:  Eyes:  ENMT:perrla  Neck:  Respiratory:clear  Cardiovascular:s1s2,m-none  Gastrointestinal:soft,bs   Extremities:  Vascular:  Neurological:no focal deficit  Musculoskeletal:    MEDICATIONS  (STANDING):  ferrous    sulfate 325 milliGRAM(s) Oral daily  pantoprazole  Injectable 40 milliGRAM(s) IV Push two times a day  polyethylene glycol 3350 17 Gram(s) Oral daily  senna 2 Tablet(s) Oral at bedtime  sucralfate suspension 1 Gram(s) Oral four times a day    MEDICATIONS  (PRN):  aluminum hydroxide/magnesium hydroxide/simethicone Suspension 30 milliLiter(s) Oral every 4 hours PRN Dyspepsia  ondansetron    Tablet 8 milliGRAM(s) Oral every 8 hours PRN Nausea and/or Vomiting      Allergies    No Known Allergies    Intolerances        LABS:                        12.2   5.33  )-----------( 227      ( 06 May 2022 06:01 )             37.8     05-06    142  |  109<H>  |  24<H>  ----------------------------<  101<H>  4.2   |  29  |  0.98    Ca    8.4      06 May 2022 06:01  Phos  2.9     05-05  Mg     2.7     05-05    TPro  6.5  /  Alb  3.1<L>  /  TBili  0.4  /  DBili  x   /  AST  12  /  ALT  19  /  AlkPhos  90  05-06    PT/INR - ( 06 May 2022 06:01 )   PT: 13.1 sec;   INR: 1.10 ratio         PTT - ( 06 May 2022 06:01 )  PTT:29.5 sec      RADIOLOGY & ADDITIONAL TESTS:      Assessment and Plan:   · Assessment	  Patient is a 72 y/o M w/ Hx of gastric ulcer (s/p partial gastrectomy and Bilroth II reconstruction in 1970s), VASILIY (received weekly IV Iron x 5), HTN (not on any meds), s/p appendectomy (1980s) and s/p hydrocele (1990s). He presented to the ED with melena x 4 days w/ assoc. nausea. He was admitted for GIB, GI and surgery consulted. Patient started on Iron and GI ppx, EGD scheduled for today. Patient tested positive for Hep C, hepatology dr. Curry consulted, Hepatic US and hep panel ordered.       COVID-19 Negative Lab Result:  · COVID-19 Negative Lab Result	COVID-19 ruled out     Problem/Plan - 1:  ·  Problem: Gastrointestinal bleeding -stable clinically  Protonix IV OD  EGD on (5/5/22)  Gastro (Dr. Aragon) consulted  Surgery consulted - no acute intervention.  advance diet  d/c planning     Problem/Plan - 2:  ·  Problem: Hepatitis-C.  ·  Plan: Pt tested positive for Hep C S/CO ratio and Hep C virus interpretation  f/u hepatic/pancreatic US  LFT normal  Hepatology dr. Curry consulted.     Problem/Plan - 3:  ·  Problem: Iron deficiency anemia.  ·  Plan: Pt with Hx of VASILIY  given 5 doses of weekly IV iron  Hgb/Hct on admission - 13.6/41.4  no signs of active bleeding  monitor CBC  Start FeSO4 tab daily  f/u FOBT  f/u anemia panel.     Problem/Plan - 4:  ·  Problem: HTN (hypertension).  ·  Plan: p/w elevated BP - 191/89  no meds  started on Lisinopril 10 mg OD.  monitor BP.     Problem/Plan - 5:  ·  Problem: Prophylactic measure.   ·  Plan: SCD for dvt ppx due to GIB.

## 2022-05-06 NOTE — PROGRESS NOTE ADULT - SUBJECTIVE AND OBJECTIVE BOX
HPI:      OVERNIGHT EVENTS:      REVIEW OF SYSTEMS:      CONSTITUTIONAL: No fever  EYES: no acute visual disturbances  NECK: No pain or stiffness  RESPIRATORY: No cough; No shortness of breath  CARDIOVASCULAR: No chest pain, no palpitations  GASTROINTESTINAL: No pain. No nausea, vomiting or diarrhea   NEUROLOGICAL: No headache or numbness, no tremors  MUSCULOSKELETAL: No joint pain, no muscle pain  GENITOURINARY: no dysuria, no frequency, no hesitancy  PSYCHIATRY: no depression, no anxiety  ALL OTHER  ROS negative        Vital Signs Last 24 Hrs  T(C): 36.8 (06 May 2022 13:37), Max: 37.1 (06 May 2022 05:22)  T(F): 98.3 (06 May 2022 13:37), Max: 98.7 (06 May 2022 05:22)  HR: 76 (06 May 2022 13:37) (65 - 76)  BP: 114/52 (06 May 2022 13:37) (114/52 - 123/75)  BP(mean): --  RR: 18 (06 May 2022 13:37) (18 - 18)  SpO2: 98% (06 May 2022 13:37) (96% - 98%)    ________________________________________________  PHYSICAL EXAM:    GENERAL: NAD  HEENT: Normocephalic; conjunctivae and sclerae clear;  NECK : supple, no JVD  CHEST/LUNG: Clear to auscultation; Nonlabored  HEART: S1 S2  regular  ABDOMEN: Soft, Nontender, Nondistended; Bowel sounds present  EXTREMITIES: no cyanosis; no LE edema; no calf tenderness  SKIN: warm and dry; No rashes or lesions  NERVOUS SYSTEM:  Alert; no new deficits    _________________________________________________  CURRENT MEDICATIONS:    MEDICATIONS  (STANDING):  ferrous    sulfate 325 milliGRAM(s) Oral daily  pantoprazole  Injectable 40 milliGRAM(s) IV Push two times a day  polyethylene glycol 3350 17 Gram(s) Oral daily  senna 2 Tablet(s) Oral at bedtime  sucralfate suspension 1 Gram(s) Oral four times a day    MEDICATIONS  (PRN):  aluminum hydroxide/magnesium hydroxide/simethicone Suspension 30 milliLiter(s) Oral every 4 hours PRN Dyspepsia  ondansetron    Tablet 8 milliGRAM(s) Oral every 8 hours PRN Nausea and/or Vomiting      __________________________________________________  LABS:                          12.5   6.47  )-----------( 238      ( 06 May 2022 16:42 )             37.2     05-06    142  |  109<H>  |  24<H>  ----------------------------<  101<H>  4.2   |  29  |  0.98    Ca    8.4      06 May 2022 06:01  Phos  2.9     05-05  Mg     2.7     05-05    TPro  6.5  /  Alb  3.1<L>  /  TBili  0.4  /  DBili  x   /  AST  12  /  ALT  19  /  AlkPhos  90  05-06    PT/INR - ( 06 May 2022 06:01 )   PT: 13.1 sec;   INR: 1.10 ratio         PTT - ( 06 May 2022 06:01 )  PTT:29.5 sec    CAPILLARY BLOOD GLUCOSE          __________________________________________________  RADIOLOGY & ADDITIONAL TESTS:    Imaging Personally Reviewed:  YES      Consultant(s) Notes Reviewed:   YES     Plan of care was discussed with patient and /or primary care giver; all questions and concerns were addressed and care was aligned with patient's wishes.    Plan discussed with attending and consulting physicians.   HPI:  71 YOM admitted with GIB.  EGD with clips and cauterization.      OVERNIGHT EVENTS:  No new overnight events.  Seen and examined at bedside.     REVIEW OF SYSTEMS:      CONSTITUTIONAL: No fever  EYES: no acute visual disturbances  NECK: No pain or stiffness  RESPIRATORY: No cough; No shortness of breath  CARDIOVASCULAR: No chest pain, no palpitations  GASTROINTESTINAL: No pain. No nausea, vomiting or diarrhea   NEUROLOGICAL: No headache or numbness, no tremors  MUSCULOSKELETAL: No joint pain, no muscle pain  GENITOURINARY: no dysuria, no frequency, no hesitancy  PSYCHIATRY: no depression, no anxiety  ALL OTHER  ROS negative        Vital Signs Last 24 Hrs  T(C): 36.8 (06 May 2022 13:37), Max: 37.1 (06 May 2022 05:22)  T(F): 98.3 (06 May 2022 13:37), Max: 98.7 (06 May 2022 05:22)  HR: 76 (06 May 2022 13:37) (65 - 76)  BP: 114/52 (06 May 2022 13:37) (114/52 - 123/75)  BP(mean): --  RR: 18 (06 May 2022 13:37) (18 - 18)  SpO2: 98% (06 May 2022 13:37) (96% - 98%)    ________________________________________________  PHYSICAL EXAM:    GENERAL: NAD  HEENT: Normocephalic; conjunctivae and sclerae clear;  NECK : supple, no JVD  CHEST/LUNG: Clear to auscultation; Nonlabored  HEART: S1 S2  regular  ABDOMEN: Soft, Nontender, Nondistended; Bowel sounds present  EXTREMITIES: no cyanosis; no LE edema; no calf tenderness  SKIN: warm and dry; No rashes or lesions  NERVOUS SYSTEM:  Alert; no new deficits    _________________________________________________  CURRENT MEDICATIONS:    MEDICATIONS  (STANDING):  ferrous    sulfate 325 milliGRAM(s) Oral daily  pantoprazole  Injectable 40 milliGRAM(s) IV Push two times a day  polyethylene glycol 3350 17 Gram(s) Oral daily  senna 2 Tablet(s) Oral at bedtime  sucralfate suspension 1 Gram(s) Oral four times a day    MEDICATIONS  (PRN):  aluminum hydroxide/magnesium hydroxide/simethicone Suspension 30 milliLiter(s) Oral every 4 hours PRN Dyspepsia  ondansetron    Tablet 8 milliGRAM(s) Oral every 8 hours PRN Nausea and/or Vomiting      __________________________________________________  LABS:                          12.5   6.47  )-----------( 238      ( 06 May 2022 16:42 )             37.2     05-06    142  |  109<H>  |  24<H>  ----------------------------<  101<H>  4.2   |  29  |  0.98    Ca    8.4      06 May 2022 06:01  Phos  2.9     05-05  Mg     2.7     05-05    TPro  6.5  /  Alb  3.1<L>  /  TBili  0.4  /  DBili  x   /  AST  12  /  ALT  19  /  AlkPhos  90  05-06    PT/INR - ( 06 May 2022 06:01 )   PT: 13.1 sec;   INR: 1.10 ratio         PTT - ( 06 May 2022 06:01 )  PTT:29.5 sec    CAPILLARY BLOOD GLUCOSE          __________________________________________________  RADIOLOGY & ADDITIONAL TESTS:    Imaging Personally Reviewed:  YES    < from: US Hepatic & Pancreatic (05.05.22 @ 18:47) >  IMPRESSION:    No gallstone or gallbladder wall thickening.  Echogenic liver suggesting fatty infiltration.    < end of copied text >    < from: EGD (05.05.22 @ 16:00) >  Impressions:      Large gastrojejunal anastomotic ulcer with flat pigmented spots vs visible    vessel. Scattered gastric angiodysplasias. Complete hemostasis achieved with    three clips and APC as above.     < end of copied text >    Consultant(s) Notes Reviewed:   YES     Plan of care was discussed with patient and /or primary care giver; all questions and concerns were addressed and care was aligned with patient's wishes.    Plan discussed with attending and consulting physicians.

## 2022-05-06 NOTE — PROGRESS NOTE ADULT - PROBLEM SELECTOR PLAN 1
S/P EGD on 5/5/22. Large gastrojejunal anastomotic ulcer with flat pigmented spots vs visiblevessel. Scattered gastric angiodysplasias. Complete hemostasis achieved with three clips and APC as above.   Had 1 formed,  dark tarry stool overnight. Noted slight down trend of hemoglobin 12/37.8. Dark stool likely residual from bleed several days ago.   -Recommend Carafate slurry 1g three times daily for 14 days  -Recommend Pantoprazole 40mg twice daily for 2 weeks and daily  -Advance diet as tolerated   -Avoid NSAIDs  -Monitor for any signs of recurring bleeding  -Monitor CBC and transfuse for Hgb <7.0.  -repeat cbc today 3pm  -If discharging patient would prefer to follow up with his private GI
Pt with  melena x 4 days  Hx of gastric ulcer (s/p partial gastrectomy, Billroth II reconstruction)  EGD (4/25) - edema, friable tissue at GI anastomosis, cauterized  Colon (4/25) - diverticulosis, sigmoid  start on clear liquids  Protonix IV OD  f/u FOBT  EGD on (5/5/22)  Gastro (Dr. Aragon) consulted  Surgery consulted - no acute intervention.
EGD noted above  Hgb 12.2->12.5  Pt tolerating PO diet  Continue PPI  Trend CBC   Dr. Aragon, GI, following   Recommendations appreciated

## 2022-05-06 NOTE — PROGRESS NOTE ADULT - SUBJECTIVE AND OBJECTIVE BOX
GI PROGRESS NOTE    Patient is a 71y old  Male who presents with a chief complaint of GI bleeding (06 May 2022 10:16)      HPI:  Patient is a 72 y/o M w/ Hx of gastric ulcer (s/p partial gastrectomy and Bilroth II reconstruction in 1970s), VASILIY (received weekly IV Iron x 5), HTN (not on any meds), s/p appendectomy (1980s) and s/p hydrocele (1990s). He presented to the ED with melena x 4 days w/ assoc. nausea. He denies any vomiting, abdominal pain. He was recently seen by Gastroenterology (Dr. Srikanth Cooper) at [facility] last 4/25/22 for anemia work-up. EGD showed Arytenoid edema was found. Normal esophagus, small hiatal hernia. A fundoplication was found. The wrap appears loose. Patent Billroth II gastrectomy was found, characterized by erythema, friable mucosa a hemorrhage appearance, congestion, and edema. Treated with APC. Gastritis. Normal examined jejunum. cauterized. Colonoscopy showed Diverticulosis in the sigmoid colon and in the descending colon. He was subsequently admitted for GIB. (04 May 2022 17:46)      GI HPI  Patient resting in bed. Had 1 formed,  dark tarry stool overnight. Noted slight down trend of hemoglobin. Tolerating meals. Denies abdominal pain N&V.     ______________________________________________________________________  PMH/PSH:  PAST MEDICAL & SURGICAL HISTORY:  Gastric ulcer    History of appendectomy    History of partial gastrectomy    History of Billroth II operation      ______________________________________________________________________  MEDS:  MEDICATIONS  (STANDING):  ferrous    sulfate 325 milliGRAM(s) Oral daily  pantoprazole  Injectable 40 milliGRAM(s) IV Push two times a day  polyethylene glycol 3350 17 Gram(s) Oral daily  senna 2 Tablet(s) Oral at bedtime  sucralfate suspension 1 Gram(s) Oral four times a day    MEDICATIONS  (PRN):  aluminum hydroxide/magnesium hydroxide/simethicone Suspension 30 milliLiter(s) Oral every 4 hours PRN Dyspepsia  ondansetron    Tablet 8 milliGRAM(s) Oral every 8 hours PRN Nausea and/or Vomiting    ______________________________________________________________________  ALL:   Allergies    No Known Allergies    Intolerances      ______________________________________________________________________  SH: Social History:    lives with family  non-smoker  non-alcoholic beverage drinker  no illicit drug use (04 May 2022 17:46)    ______________________________________________________________________  FH:  FAMILY HISTORY:    ______________________________________________________________________  ROS:    CONSTITUTIONAL:  No weight loss, fever, chills, weakness or fatigue.    HEENT:  Eyes:  No visual loss, blurred vision, double vision or yellow sclerae. Ears, Nose, Throat:  No hearing loss, sneezing, congestion, runny nose or sore throat.    SKIN:  No rash or itching.    CARDIOVASCULAR:  No chest pain, chest pressure or chest discomfort. No palpitations or edema.    RESPIRATORY:  No shortness of breath, cough or sputum.    GASTROINTESTINAL:  SEE HPI    GENITOURINARY:  No dysuria, hematuria, urinary frequency    NEUROLOGICAL:  No headache, dizziness, syncope, paralysis, ataxia, numbness or tingling in the extremities. No change in bowel or bladder control.    MUSCULOSKELETAL:  No muscle, back pain, joint pain or stiffness.      ______________________________________________________________________  PHYSICAL EXAM:  T(C): 37.1 (05-06-22 @ 05:22), Max: 37.1 (05-06-22 @ 05:22)  HR: 65 (05-06-22 @ 05:22)  BP: 123/75 (05-06-22 @ 05:22)  RR: 18 (05-06-22 @ 05:22)  SpO2: 96% (05-06-22 @ 05:22)  Wt(kg): --      GEN: NAD   CVS- S1 S2  ABD: soft nontender, non distended, bowel sounds+  LUNGS: clear to auscultation  NEURO: noin focal neuro exam; AAO x 3  Extremities: no cyanosis, no calf tenderness, no edema, no clubbing      ______________________________________________________________________  LABS:                        12.2   5.33  )-----------( 227      ( 06 May 2022 06:01 )             37.8     05-06    142  |  109<H>  |  24<H>  ----------------------------<  101<H>  4.2   |  29  |  0.98    Ca    8.4      06 May 2022 06:01  Phos  2.9     05-05  Mg     2.7     05-05    TPro  6.5  /  Alb  3.1<L>  /  TBili  0.4  /  DBili  x   /  AST  12  /  ALT  19  /  AlkPhos  90  05-06    LIVER FUNCTIONS - ( 06 May 2022 06:01 )  Alb: 3.1 g/dL / Pro: 6.5 g/dL / ALK PHOS: 90 U/L / ALT: 19 U/L DA / AST: 12 U/L / GGT: x           ______________________________________________________________________  IMAGING:    ______________________________________________________________________  < from: US Hepatic & Pancreatic (05.05.22 @ 18:47) >    ACC: [id number] EXAM:  US LIVER AND PANCREAS                          PROCEDURE DATE:  05/05/2022          INTERPRETATION:  CLINICAL STATEMENT: Gastric ulcer. GI hemorrhage.   Hepatitis C reactive TECHNIQUE: Ultrasound of the right upper quadrant.    COMPARISON: None.    FINDINGS:    The liver is echogenic suggestive of fatty infiltration. There is no   focal liver mass. There is hepatopetal flow in the main portal vein.    There is no gallstone or gallbladder wall thickening.    The common bile duct is not dilated measuring 5 mm.    The pancreas is not seen.    The right kidney measures 8.3 cm in length.  There is no hydronephrosis.    The visualized aorta and IVC are unremarkable.    IMPRESSION:    No gallstone or gallbladder wall thickening.  Echogenic liver suggesting fatty infiltration.    --- End of Report ---      < end of copied text >

## 2022-05-06 NOTE — CONSULT NOTE ADULT - SUBJECTIVE AND OBJECTIVE BOX
Chief Complaint:  Patient is a 71y old  Male who presents with a chief complaint of GI bleeding (05 May 2022 16:10)      HPI:REYES, JUAN is a 71y Male    PMHX/PSHX:  No pertinent past medical history    Gastric ulcer    History of appendectomy    History of partial gastrectomy    History of Billroth II operation      Allergies:  No Known Allergies      Home Medications: reviewed  Hospital Medications:  aluminum hydroxide/magnesium hydroxide/simethicone Suspension 30 milliLiter(s) Oral every 4 hours PRN  ferrous    sulfate 325 milliGRAM(s) Oral daily  ondansetron    Tablet 8 milliGRAM(s) Oral every 8 hours PRN  pantoprazole  Injectable 40 milliGRAM(s) IV Push two times a day  polyethylene glycol 3350 17 Gram(s) Oral daily  senna 2 Tablet(s) Oral at bedtime  sucralfate suspension 1 Gram(s) Oral four times a day      Social History:   Tob: Denies  EtOH: Denies  Illicit Drugs: Denies    Family history:    Denies family history of colon cancer/polyps, stomach cancer/polyps, pancreatic cancer/masses, liver cancer/disease, ovarian cancer and endometrial cancer.    ROS:   General:  No  fevers, chills, night sweats, fatigue  Eyes:  Good vision, no reported pain  ENT:  No sore throat, pain, runny nose  CV:  No pain, palpitations  Pulm:  No dyspnea, cough  GI:  See HPI, otherwise negative  :  No  incontinence, nocturia  Muscle:  No pain, weakness  Neuro:  No memory problems  Psych:  No insomnia, mood problems, depression  Endocrine:  No polyuria, polydipsia, cold/heat intolerance  Heme:  No petechiae, ecchymosis, easy bruisability  Skin:  No rash    PHYSICAL EXAM:   Vital Signs:  Vital Signs Last 24 Hrs  T(C): 37.1 (06 May 2022 05:22), Max: 37.1 (06 May 2022 05:22)  T(F): 98.7 (06 May 2022 05:22), Max: 98.7 (06 May 2022 05:22)  HR: 65 (06 May 2022 05:22) (62 - 65)  BP: 123/75 (06 May 2022 05:22) (117/69 - 123/75)  BP(mean): --  RR: 18 (06 May 2022 05:22) (18 - 18)  SpO2: 96% (06 May 2022 05:22) (96% - 98%)  Daily     Daily     GENERAL: no acute distress  NEURO: alert, no asterixis  HEENT: anicteric sclera, no conjunctival pallor appreciated  CHEST: no respiratory distress, no accessory muscle use  CARDIAC: regular rate, rhythm  ABDOMEN: soft, non-tender, non-distended, no rebound or guarding  EXTREMITIES: warm, well perfused, no edema  SKIN: no lesions noted    LABS: reviewed                        12.2   5.33  )-----------( 227      ( 06 May 2022 06:01 )             37.8     05-06    142  |  109<H>  |  24<H>  ----------------------------<  101<H>  4.2   |  29  |  0.98    Ca    8.4      06 May 2022 06:01  Phos  2.9     05-05  Mg     2.7     05-05    TPro  6.5  /  Alb  3.1<L>  /  TBili  0.4  /  DBili  x   /  AST  12  /  ALT  19  /  AlkPhos  90  05-06    LIVER FUNCTIONS - ( 06 May 2022 06:01 )  Alb: 3.1 g/dL / Pro: 6.5 g/dL / ALK PHOS: 90 U/L / ALT: 19 U/L DA / AST: 12 U/L / GGT: x               Diagnostic Studies: see sunrise for full report         Chief Complaint:  Patient is a 71y old  Male who presents with a chief complaint of GI bleeding (05 May 2022 16:10)      HPI:REYES, JUAN is a 71y Male with Hx of gastric ulcer (s/p partial gastrectomy and Bilroth II reconstruction in 1970s), VASILIY (received weekly IV Iron x 5), HTN (not on any meds), s/p appendectomy (1980s) and s/p hydrocele (1990s), presented to [facility] ED on 5/4/22 with melena x 4 days w/ assoc. nausea but denied vomiting, abdominal pain. He was recently seen by Gastroenterology (Dr. Srikanth Cooper) at [facility], last 4/25/22 for anemia work-up. EGD showed "Arytenoid edema, normal esophagus, small hiatal hernia. A fundoplication was found. The wrap appeared loose. Patent Billroth II gastrectomy was found, characterized by erythema, friable mucosa a hemorrhage appearance, congestion, and edema. Treated with APC. Gastritis. Normal examined jejunum. cauterized." Colonoscopy showed Diverticulosis in the sigmoid colon and in the descending colon.   He was this time admitted with GIB and underwent EGD, showing large gastrojejunal ulcer with flat pigmented spots vs. visible vessel, scattered gastric angiodysplasias, s/p 3 clips and APC.   Hepatology was consulted b/o pos Hep C ab.     PMHX/PSHX:   Gastric ulcer  History of appendectomy  History of partial gastrectomy  History of Billroth II operation      Allergies:  No Known Allergies      Home Medications: reviewed  Hospital Medications:  aluminum hydroxide/magnesium hydroxide/simethicone Suspension 30 milliLiter(s) Oral every 4 hours PRN  ferrous    sulfate 325 milliGRAM(s) Oral daily  ondansetron    Tablet 8 milliGRAM(s) Oral every 8 hours PRN  pantoprazole  Injectable 40 milliGRAM(s) IV Push two times a day  polyethylene glycol 3350 17 Gram(s) Oral daily  senna 2 Tablet(s) Oral at bedtime  sucralfate suspension 1 Gram(s) Oral four times a day      Social History:   Tob: Denies  EtOH: Denies  Illicit Drugs: Denies    Family history:    Denies family history of colon cancer/polyps, stomach cancer/polyps, pancreatic cancer/masses, liver cancer/disease, ovarian cancer and endometrial cancer.    ROS:   General:  No  fevers, chills, night sweats, fatigue  Eyes:  Good vision, no reported pain  ENT:  No sore throat, pain, runny nose  CV:  No pain, palpitations  Pulm:  No dyspnea, cough  GI:  See HPI, otherwise negative  :  No  incontinence, nocturia  Muscle:  No pain, weakness  Neuro:  No memory problems  Psych:  No insomnia, mood problems, depression  Endocrine:  No polyuria, polydipsia, cold/heat intolerance  Heme:  No petechiae, ecchymosis, easy bruisability  Skin:  No rash    PHYSICAL EXAM:   Vital Signs:  Vital Signs Last 24 Hrs  T(C): 37.1 (06 May 2022 05:22), Max: 37.1 (06 May 2022 05:22)  T(F): 98.7 (06 May 2022 05:22), Max: 98.7 (06 May 2022 05:22)  HR: 65 (06 May 2022 05:22) (62 - 65)  BP: 123/75 (06 May 2022 05:22) (117/69 - 123/75)  BP(mean): --  RR: 18 (06 May 2022 05:22) (18 - 18)  SpO2: 96% (06 May 2022 05:22) (96% - 98%)  Daily     Daily     GENERAL: no acute distress  NEURO: alert, no asterixis  HEENT: anicteric sclera, no conjunctival pallor appreciated  CHEST: no respiratory distress, no accessory muscle use  CARDIAC: regular rate, rhythm  ABDOMEN: soft, non-tender, non-distended, no rebound or guarding  EXTREMITIES: warm, well perfused, no edema  SKIN: no lesions noted    LABS: reviewed                        12.2   5.33  )-----------( 227      ( 06 May 2022 06:01 )             37.8     05-06    142  |  109<H>  |  24<H>  ----------------------------<  101<H>  4.2   |  29  |  0.98    Ca    8.4      06 May 2022 06:01  Phos  2.9     05-05  Mg     2.7     05-05    TPro  6.5  /  Alb  3.1<L>  /  TBili  0.4  /  DBili  x   /  AST  12  /  ALT  19  /  AlkPhos  90  05-06    LIVER FUNCTIONS - ( 06 May 2022 06:01 )  Alb: 3.1 g/dL / Pro: 6.5 g/dL / ALK PHOS: 90 U/L / ALT: 19 U/L DA / AST: 12 U/L / GGT: x               Diagnostic Studies: see sunrise for full report         Chief Complaint:  Patient is a 71y old  Male who presents with a chief complaint of GI bleeding (05 May 2022 16:10)      HPI:REYES, JUAN is a 71y Male with Hx of gastric ulcer (s/p partial gastrectomy and Bilroth II reconstruction in 1970s), VASILIY (received weekly IV Iron x 5), HTN (not on any meds), s/p appendectomy (1980s) and s/p hydrocele (1990s), presented to [facility] ED on 5/4/22 with melena x 4 days w/ assoc. nausea but denied vomiting, abdominal pain. He was recently seen by Gastroenterology (Dr. Srikanth Cooper) at [facility], last 4/25/22 for anemia work-up. EGD showed "Arytenoid edema, normal esophagus, small hiatal hernia. A fundoplication was found. The wrap appeared loose. Patent Billroth II gastrectomy was found, characterized by erythema, friable mucosa a hemorrhage appearance, congestion, and edema. Treated with APC. Gastritis. Normal examined jejunum. cauterized." Colonoscopy showed Diverticulosis in the sigmoid colon and in the descending colon.   He was this time admitted with GIB and underwent EGD, showing large gastrojejunal ulcer with flat pigmented spots vs. visible vessel, scattered gastric angiodysplasias, s/p 3 clips and APC.   Hepatology was consulted b/o pos Hep C ab.   Patient requested to talk to his daughter, information was obtained both from patient and daughter, who also interpreted. Patient has Hx of treated Hep C.     PMHX/PSHX:   Gastric ulcer  History of appendectomy  History of partial gastrectomy  History of Billroth II operation      Allergies:  No Known Allergies      Home Medications: reviewed  Hospital Medications:  aluminum hydroxide/magnesium hydroxide/simethicone Suspension 30 milliLiter(s) Oral every 4 hours PRN  ferrous    sulfate 325 milliGRAM(s) Oral daily  ondansetron    Tablet 8 milliGRAM(s) Oral every 8 hours PRN  pantoprazole  Injectable 40 milliGRAM(s) IV Push two times a day  polyethylene glycol 3350 17 Gram(s) Oral daily  senna 2 Tablet(s) Oral at bedtime  sucralfate suspension 1 Gram(s) Oral four times a day      Social History:   Tob: Denies  EtOH: Denies  Illicit Drugs: Denies    Family history:    Denies family history of colon cancer/polyps, stomach cancer/polyps, pancreatic cancer/masses, liver cancer/disease, ovarian cancer and endometrial cancer.    ROS:   General:  No  fevers, chills, night sweats, fatigue  Eyes:  Good vision, no reported pain  ENT:  No sore throat, pain, runny nose  CV:  No pain, palpitations  Pulm:  No dyspnea, cough  GI:  No abdominal pain, N/V, diarrhea, still reports dark, tarry stool, 1 BM o/n  :  No  dysuria  Muscle:  No pain, weakness  Neuro:  No memory problems  Heme:  No petechiae, ecchymosis, easy bruisability  Skin:  No rash    PHYSICAL EXAM:   Vital Signs:  Vital Signs Last 24 Hrs  T(C): 37.1 (06 May 2022 05:22), Max: 37.1 (06 May 2022 05:22)  T(F): 98.7 (06 May 2022 05:22), Max: 98.7 (06 May 2022 05:22)  HR: 65 (06 May 2022 05:22) (62 - 65)  BP: 123/75 (06 May 2022 05:22) (117/69 - 123/75)  BP(mean): --  RR: 18 (06 May 2022 05:22) (18 - 18)  SpO2: 96% (06 May 2022 05:22) (96% - 98%)  Daily     Daily     GENERAL: no acute distress  NEURO:AAOx3, no asterixis  HEENT: anicteric sclera, no conjunctival pallor appreciated  CHEST: no respiratory distress, no accessory muscle use  CARDIAC: regular rate, rhythm  ABDOMEN: soft, non-tender, non-distended, no rebound or guarding, BS+  EXTREMITIES: warm, well perfused, no edema  SKIN: no rash    LABS: reviewed                        12.2   5.33  )-----------( 227      ( 06 May 2022 06:01 )             37.8     05-06    142  |  109<H>  |  24<H>  ----------------------------<  101<H>  4.2   |  29  |  0.98    Ca    8.4      06 May 2022 06:01  Phos  2.9     05-05  Mg     2.7     05-05    TPro  6.5  /  Alb  3.1<L>  /  TBili  0.4  /  DBili  x   /  AST  12  /  ALT  19  /  AlkPhos  90  05-06    LIVER FUNCTIONS - ( 06 May 2022 06:01 )  Alb: 3.1 g/dL / Pro: 6.5 g/dL / ALK PHOS: 90 U/L / ALT: 19 U/L DA / AST: 12 U/L / GGT: x               Diagnostic Studies: see sunrise for full report

## 2022-05-07 VITALS
SYSTOLIC BLOOD PRESSURE: 144 MMHG | RESPIRATION RATE: 16 BRPM | HEART RATE: 83 BPM | DIASTOLIC BLOOD PRESSURE: 83 MMHG | TEMPERATURE: 98 F | OXYGEN SATURATION: 98 %

## 2022-05-07 LAB
HCT VFR BLD CALC: 39.7 % — SIGNIFICANT CHANGE UP (ref 39–50)
HCV RNA FLD QL NAA+PROBE: SIGNIFICANT CHANGE UP
HCV RNA SPEC QL PROBE+SIG AMP: SIGNIFICANT CHANGE UP
HGB BLD-MCNC: 13.2 G/DL — SIGNIFICANT CHANGE UP (ref 13–17)
MCHC RBC-ENTMCNC: 28.1 PG — SIGNIFICANT CHANGE UP (ref 27–34)
MCHC RBC-ENTMCNC: 33.2 GM/DL — SIGNIFICANT CHANGE UP (ref 32–36)
MCV RBC AUTO: 84.6 FL — SIGNIFICANT CHANGE UP (ref 80–100)
NRBC # BLD: 0 /100 WBCS — SIGNIFICANT CHANGE UP (ref 0–0)
PLATELET # BLD AUTO: 227 K/UL — SIGNIFICANT CHANGE UP (ref 150–400)
RBC # BLD: 4.69 M/UL — SIGNIFICANT CHANGE UP (ref 4.2–5.8)
RBC # FLD: 19.6 % — HIGH (ref 10.3–14.5)
WBC # BLD: 6.05 K/UL — SIGNIFICANT CHANGE UP (ref 3.8–10.5)
WBC # FLD AUTO: 6.05 K/UL — SIGNIFICANT CHANGE UP (ref 3.8–10.5)

## 2022-05-07 PROCEDURE — 85610 PROTHROMBIN TIME: CPT

## 2022-05-07 PROCEDURE — 83010 ASSAY OF HAPTOGLOBIN QUANT: CPT

## 2022-05-07 PROCEDURE — 83540 ASSAY OF IRON: CPT

## 2022-05-07 PROCEDURE — 86704 HEP B CORE ANTIBODY TOTAL: CPT

## 2022-05-07 PROCEDURE — 86803 HEPATITIS C AB TEST: CPT

## 2022-05-07 PROCEDURE — 87521 HEPATITIS C PROBE&RVRS TRNSC: CPT

## 2022-05-07 PROCEDURE — 86901 BLOOD TYPING SEROLOGIC RH(D): CPT

## 2022-05-07 PROCEDURE — 87389 HIV-1 AG W/HIV-1&-2 AB AG IA: CPT

## 2022-05-07 PROCEDURE — 80053 COMPREHEN METABOLIC PANEL: CPT

## 2022-05-07 PROCEDURE — 87522 HEPATITIS C REVRS TRNSCRPJ: CPT

## 2022-05-07 PROCEDURE — 85730 THROMBOPLASTIN TIME PARTIAL: CPT

## 2022-05-07 PROCEDURE — 85045 AUTOMATED RETICULOCYTE COUNT: CPT

## 2022-05-07 PROCEDURE — 36415 COLL VENOUS BLD VENIPUNCTURE: CPT

## 2022-05-07 PROCEDURE — 76705 ECHO EXAM OF ABDOMEN: CPT

## 2022-05-07 PROCEDURE — 82272 OCCULT BLD FECES 1-3 TESTS: CPT

## 2022-05-07 PROCEDURE — 83550 IRON BINDING TEST: CPT

## 2022-05-07 PROCEDURE — 87635 SARS-COV-2 COVID-19 AMP PRB: CPT

## 2022-05-07 PROCEDURE — 83615 LACTATE (LD) (LDH) ENZYME: CPT

## 2022-05-07 PROCEDURE — 86708 HEPATITIS A ANTIBODY: CPT

## 2022-05-07 PROCEDURE — 86900 BLOOD TYPING SEROLOGIC ABO: CPT

## 2022-05-07 PROCEDURE — 85025 COMPLETE CBC W/AUTO DIFF WBC: CPT

## 2022-05-07 PROCEDURE — 84100 ASSAY OF PHOSPHORUS: CPT

## 2022-05-07 PROCEDURE — 87902 NFCT AGT GNTYP ALYS HEP C: CPT

## 2022-05-07 PROCEDURE — 86850 RBC ANTIBODY SCREEN: CPT

## 2022-05-07 PROCEDURE — 99285 EMERGENCY DEPT VISIT HI MDM: CPT

## 2022-05-07 PROCEDURE — 83690 ASSAY OF LIPASE: CPT

## 2022-05-07 PROCEDURE — 84484 ASSAY OF TROPONIN QUANT: CPT

## 2022-05-07 PROCEDURE — 85027 COMPLETE CBC AUTOMATED: CPT

## 2022-05-07 PROCEDURE — 86706 HEP B SURFACE ANTIBODY: CPT

## 2022-05-07 PROCEDURE — 83735 ASSAY OF MAGNESIUM: CPT

## 2022-05-07 PROCEDURE — C1889: CPT

## 2022-05-07 PROCEDURE — 82728 ASSAY OF FERRITIN: CPT

## 2022-05-07 RX ORDER — POLYETHYLENE GLYCOL 3350 17 G/17G
17 POWDER, FOR SOLUTION ORAL
Qty: 510 | Refills: 0
Start: 2022-05-07 | End: 2022-06-05

## 2022-05-07 RX ORDER — SENNA PLUS 8.6 MG/1
2 TABLET ORAL
Qty: 60 | Refills: 0
Start: 2022-05-07 | End: 2022-06-05

## 2022-05-07 RX ORDER — FERROUS SULFATE 325(65) MG
1 TABLET ORAL
Qty: 30 | Refills: 0
Start: 2022-05-07 | End: 2022-06-05

## 2022-05-07 RX ORDER — SUCRALFATE 1 G
10 TABLET ORAL
Qty: 520 | Refills: 0
Start: 2022-05-07 | End: 2022-05-19

## 2022-05-07 RX ORDER — PANTOPRAZOLE SODIUM 20 MG/1
1 TABLET, DELAYED RELEASE ORAL
Qty: 43 | Refills: 0
Start: 2022-05-07 | End: 2022-06-05

## 2022-05-07 RX ADMIN — Medication 1 GRAM(S): at 06:52

## 2022-05-07 RX ADMIN — PANTOPRAZOLE SODIUM 40 MILLIGRAM(S): 20 TABLET, DELAYED RELEASE ORAL at 06:52

## 2022-05-07 RX ADMIN — Medication 1 GRAM(S): at 11:16

## 2022-05-07 RX ADMIN — Medication 325 MILLIGRAM(S): at 11:16

## 2022-05-07 RX ADMIN — POLYETHYLENE GLYCOL 3350 17 GRAM(S): 17 POWDER, FOR SOLUTION ORAL at 11:16

## 2022-05-07 NOTE — DISCHARGE NOTE NURSING/CASE MANAGEMENT/SOCIAL WORK - PATIENT PORTAL LINK FT
You can access the FollowMyHealth Patient Portal offered by [facility] by registering at the following website: [email or web address]. By joining [employer]’s FollowMyHealth portal, you will also be able to view your health information using other applications (apps) compatible with our system.

## 2022-05-07 NOTE — DISCHARGE NOTE NURSING/CASE MANAGEMENT/SOCIAL WORK - NSDCPEFALRISK_GEN_ALL_CORE
For information on Fall & Injury Prevention, visit: [email or web address] OR  [email or web address] OR  https://www.cdc.gov/steadi/patient.html

## 2022-05-07 NOTE — DISCHARGE NOTE PROVIDER - PROVIDER TOKENS
PROVIDER:[TOKEN:[[id number]],FOLLOWUP:[2 weeks]],PROVIDER:[TOKEN:[[id number]],FOLLOWUP:[1-3 days]],FREE:[LAST:[Schnall],FIRST:[Srikanth],PHONE:[(   )    -],FAX:[(   )    -],FOLLOWUP:[1 week],ESTABLISHEDPATIENT:[T]]

## 2022-05-07 NOTE — DISCHARGE NOTE PROVIDER - NSDCMRMEDTOKEN_GEN_ALL_CORE_FT
aluminum hydroxide-magnesium hydroxide 200 mg-200 mg/5 mL oral suspension: 30 milliliter(s) orally every 4 hours, As needed, Dyspepsia  ferrous sulfate 325 mg (65 mg elemental iron) oral tablet: 1 tab(s) orally once a day  polyethylene glycol 3350 oral powder for reconstitution: 17 gram(s) orally once a day, As Needed -for constipation   senna oral tablet: 2 tab(s) orally once a day (at bedtime)   aluminum hydroxide-magnesium hydroxide 200 mg-200 mg/5 mL oral suspension: 30 milliliter(s) orally every 4 hours, As needed, Dyspepsia  ferrous sulfate 325 mg (65 mg elemental iron) oral tablet: 1 tab(s) orally once a day  polyethylene glycol 3350 oral powder for reconstitution: 17 gram(s) orally once a day, As Needed -for constipation   Protonix 40 mg oral delayed release tablet: 1 tab(s) orally every 12 hours for 13 days then, once a day   senna oral tablet: 2 tab(s) orally once a day (at bedtime)  sucralfate 1 g/10 mL oral suspension: 10 milliliter(s) orally 4 times a day

## 2022-05-07 NOTE — DISCHARGE NOTE PROVIDER - NSDCCPCAREPLAN_GEN_ALL_CORE_FT
PRINCIPAL DISCHARGE DIAGNOSIS  Diagnosis: GI bleed  Assessment and Plan of Treatment: There are 2 common types of GI Bleed, Upper GI Bleed and Lower GI Bleed.  Upper GI Bleed affects the esophagus, stomach, and first part of the small intestine. Lower GI Bleed affects the colon and rectum.  Upper GI Bleed signs and symptoms to notify your Health Care Provider are vomiting blood, or coffee ground vomitus, and bowel movements that look like black tar.  Lower GI Bleed signs and symptoms to notify your health care provider are bright red bloody bowel movements.   Take your medications as prescribed by your Gastroenterologist.  If you have had an Endoscopy or Colonoscopy, follow up with your Gastroenterologist for Pathology results.  Avoid NSAIDs unless your Health Care Provider tells you that it is ok (Aspirin, Ibuprofen, Advil, Motrin, Aleve).  Follow up with your Gastroenterologist within 1-2 weeks of discharge.      SECONDARY DISCHARGE DIAGNOSES  Diagnosis: Iron deficiency anemia  Assessment and Plan of Treatment:      PRINCIPAL DISCHARGE DIAGNOSIS  Diagnosis: GI bleed  Assessment and Plan of Treatment: There are 2 common types of GI Bleed, Upper GI Bleed and Lower GI Bleed.  Upper GI Bleed affects the esophagus, stomach, and first part of the small intestine. Lower GI Bleed affects the colon and rectum.  Upper GI Bleed signs and symptoms to notify your Health Care Provider are vomiting blood, or coffee ground vomitus, and bowel movements that look like black tar.  Lower GI Bleed signs and symptoms to notify your health care provider are bright red bloody bowel movements.   Take your medications as prescribed by your Gastroenterologist.  If you have had an Endoscopy or Colonoscopy, follow up with your Gastroenterologist for Pathology results.  Avoid NSAIDs unless your Health Care Provider tells you that it is ok (Aspirin, Ibuprofen, Advil, Motrin, Aleve).  Follow up with your Gastroenterologist within 1-2 weeks of discharge.      SECONDARY DISCHARGE DIAGNOSES  Diagnosis: Iron deficiency anemia  Assessment and Plan of Treatment: Both hemoglobin (Hgb) and hematocrit values are used to define anemia. These lab values are obtained from a complete blood count (CBC) test. This is performed at a caregiver's office  Iron is an essential part of hemoglobin. Without enough hemoglobin, anemia develops and the body does not get the right amount of oxygen. Iron deficiency anemia develops after the body has had a low level of iron for a long time. This is either caused by blood loss, not taking in or absorbing enough iron, or increased demands for iron (like pregnancy or rapid growth).   Foods from animal origin such as beef, chicken, and pork, are good sources of iron. Be sure to have one of these foods at each meal. Vitamin C helps your body absorb iron. Foods rich in Vitamin C include citrus, bell pepper, strawberries, spinach and cantaloupe. In some cases, iron supplements may be needed in order to correct the iron deficiency. In the case of poor absorption, extra iron may have to be given directly into the vein through a needle (intravenously)    Diagnosis: Hepatitis-C  Assessment and Plan of Treatment: During the course of your stay you tested positive for Hepatitis C.  Further lab work was obtained and you will need to follow up with Hepatology for further work up.     Hepatisis C (Hep C) is a disease that harm the liver.  The liver is a big organ in the uppoer right side of your abdomen.  A virus caused this disease.  The visus is called the hepatitis C virus.  It spreads from person to person through blood contact.  Most people with Hep C have no symptoms.  When symptoms do occur, they can include feeling tired tired/weak, lack of hunger, nausea, muscle/joint aches or weight loss.  In most cases, Hep C last many years.  If untreated, over time the virus causes scarring to your liver called cirrhosis.    Diagnosis: HTN (hypertension)  Assessment and Plan of Treatment: Continue to take your medication as prescribed.  Follow up with your primary doctor regularly to ensure your medication is therapeutic.

## 2022-05-07 NOTE — DISCHARGE NOTE PROVIDER - CARE PROVIDER_API CALL
[facility] [place] [address]  Phone: [phone]  Fax: [phone]  Follow Up Time: 2 weeks    Valerie Curry)  Internal Medicine  [address] [place] [address]  Phone: [phone]  Fax: [phone]  Follow Up Time: 1-3 days    Srikanth Cooper  Phone: (   )    -  Fax: (   )    -  Established Patient  Follow Up Time: 1 week

## 2022-05-07 NOTE — DISCHARGE NOTE PROVIDER - HOSPITAL COURSE
Patient is a 72 y/o M w/ Hx of gastric ulcer (s/p partial gastrectomy and Bilroth II reconstruction in 1970s), VASILIY (received weekly IV Iron x 5), HTN (not on any meds), s/p appendectomy (1980s) and s/p hydrocele (1990s). He presented to the ED with melena x 4 days w/ assoc. nausea. He denies any vomiting, abdominal pain. He was recently seen by Gastroenterology (Dr. Srikanth Cooper) at [facility] last 4/25/22 for anemia work-up. EGD showed Arytenoid edema was found. Normal esophagus, small hiatal hernia. A fundoplication was found. The wrap appears loose. Patent Billroth II gastrectomy was found, characterized by erythema, friable mucosa a hemorrhage appearance, congestion, and edema. Treated with APC. Gastritis. Normal examined jejunum. cauterized. Colonoscopy showed Diverticulosis in the sigmoid colon and in the descending colon. He was subsequently admitted for GIB.  GI cosnutled, recommends EGD.  EGD revealed Large gastrojejunal anastomotic ulcer w/flat pigmented spots vs visible vessel. Scattered gastric angiodysplasias. Complete hemostasis achieved with 3 clips.  Hgb AM s/p EGD Hgb 12.2 -> 12.5.  Next AM 13.2.    Case discussed with attending.  Given patient's improved clinical status and current hemodynamic stability, the decision was made for discharge.    This is a summary of the patients hospitalization.  For full hospital course, please see medical record.

## 2022-05-12 LAB — HCV GENTYP BLD NAA+PROBE: SIGNIFICANT CHANGE UP

## 2023-07-30 ENCOUNTER — EMERGENCY (EMERGENCY)
Facility: HOSPITAL | Age: 73
LOS: 1 days | Discharge: HOME | End: 2023-07-30
Attending: EMERGENCY MEDICINE
Payer: COMMERCIAL

## 2023-07-30 DIAGNOSIS — Z90.49 ACQUIRED ABSENCE OF OTHER SPECIFIED PARTS OF DIGESTIVE TRACT: Chronic | ICD-10-CM

## 2023-07-30 DIAGNOSIS — Z90.3 ACQUIRED ABSENCE OF STOMACH [PART OF]: Chronic | ICD-10-CM

## 2023-07-30 DIAGNOSIS — Z98.0 INTESTINAL BYPASS AND ANASTOMOSIS STATUS: Chronic | ICD-10-CM

## 2023-07-30 PROCEDURE — 99285 EMERGENCY DEPT VISIT HI MDM: CPT | Mod: 25

## 2023-07-31 VITALS
RESPIRATION RATE: 18 BRPM | HEART RATE: 54 BPM | DIASTOLIC BLOOD PRESSURE: 98 MMHG | WEIGHT: 149.91 LBS | OXYGEN SATURATION: 97 % | TEMPERATURE: 98 F | HEIGHT: 64 IN | SYSTOLIC BLOOD PRESSURE: 210 MMHG

## 2023-07-31 VITALS — HEART RATE: 56 BPM | SYSTOLIC BLOOD PRESSURE: 156 MMHG | DIASTOLIC BLOOD PRESSURE: 90 MMHG

## 2023-07-31 PROBLEM — K25.9 GASTRIC ULCER, UNSPECIFIED AS ACUTE OR CHRONIC, WITHOUT HEMORRHAGE OR PERFORATION: Chronic | Status: ACTIVE | Noted: 2022-05-04

## 2023-07-31 LAB
ALBUMIN SERPL ELPH-MCNC: 3.7 G/DL — SIGNIFICANT CHANGE UP (ref 3.5–5)
ALP SERPL-CCNC: 105 U/L — SIGNIFICANT CHANGE UP (ref 40–120)
ALT FLD-CCNC: 21 U/L DA — SIGNIFICANT CHANGE UP (ref 10–60)
ANION GAP SERPL CALC-SCNC: 7 MMOL/L — SIGNIFICANT CHANGE UP (ref 5–17)
APPEARANCE UR: CLEAR — SIGNIFICANT CHANGE UP
AST SERPL-CCNC: 16 U/L — SIGNIFICANT CHANGE UP (ref 10–40)
BASOPHILS # BLD AUTO: 0.05 K/UL — SIGNIFICANT CHANGE UP (ref 0–0.2)
BASOPHILS NFR BLD AUTO: 0.8 % — SIGNIFICANT CHANGE UP (ref 0–2)
BILIRUB SERPL-MCNC: 0.4 MG/DL — SIGNIFICANT CHANGE UP (ref 0.2–1.2)
BILIRUB UR-MCNC: NEGATIVE — SIGNIFICANT CHANGE UP
BUN SERPL-MCNC: 16 MG/DL — SIGNIFICANT CHANGE UP (ref 7–18)
CALCIUM SERPL-MCNC: 8.5 MG/DL — SIGNIFICANT CHANGE UP (ref 8.4–10.5)
CHLORIDE SERPL-SCNC: 108 MMOL/L — SIGNIFICANT CHANGE UP (ref 96–108)
CO2 SERPL-SCNC: 30 MMOL/L — SIGNIFICANT CHANGE UP (ref 22–31)
COLOR SPEC: YELLOW — SIGNIFICANT CHANGE UP
CREAT SERPL-MCNC: 1.06 MG/DL — SIGNIFICANT CHANGE UP (ref 0.5–1.3)
D DIMER BLD IA.RAPID-MCNC: <150 NG/ML DDU — SIGNIFICANT CHANGE UP
DIFF PNL FLD: ABNORMAL
EGFR: 74 ML/MIN/1.73M2 — SIGNIFICANT CHANGE UP
EOSINOPHIL # BLD AUTO: 0.2 K/UL — SIGNIFICANT CHANGE UP (ref 0–0.5)
EOSINOPHIL NFR BLD AUTO: 3 % — SIGNIFICANT CHANGE UP (ref 0–6)
GLUCOSE SERPL-MCNC: 111 MG/DL — HIGH (ref 70–99)
GLUCOSE UR QL: NEGATIVE — SIGNIFICANT CHANGE UP
HCT VFR BLD CALC: 47.3 % — SIGNIFICANT CHANGE UP (ref 39–50)
HGB BLD-MCNC: 15.8 G/DL — SIGNIFICANT CHANGE UP (ref 13–17)
IMM GRANULOCYTES NFR BLD AUTO: 0.8 % — SIGNIFICANT CHANGE UP (ref 0–0.9)
KETONES UR-MCNC: NEGATIVE — SIGNIFICANT CHANGE UP
LEUKOCYTE ESTERASE UR-ACNC: NEGATIVE — SIGNIFICANT CHANGE UP
LYMPHOCYTES # BLD AUTO: 1.6 K/UL — SIGNIFICANT CHANGE UP (ref 1–3.3)
LYMPHOCYTES # BLD AUTO: 24.2 % — SIGNIFICANT CHANGE UP (ref 13–44)
MCHC RBC-ENTMCNC: 30.2 PG — SIGNIFICANT CHANGE UP (ref 27–34)
MCHC RBC-ENTMCNC: 33.4 GM/DL — SIGNIFICANT CHANGE UP (ref 32–36)
MCV RBC AUTO: 90.3 FL — SIGNIFICANT CHANGE UP (ref 80–100)
MONOCYTES # BLD AUTO: 0.53 K/UL — SIGNIFICANT CHANGE UP (ref 0–0.9)
MONOCYTES NFR BLD AUTO: 8 % — SIGNIFICANT CHANGE UP (ref 2–14)
NEUTROPHILS # BLD AUTO: 4.19 K/UL — SIGNIFICANT CHANGE UP (ref 1.8–7.4)
NEUTROPHILS NFR BLD AUTO: 63.2 % — SIGNIFICANT CHANGE UP (ref 43–77)
NITRITE UR-MCNC: NEGATIVE — SIGNIFICANT CHANGE UP
NRBC # BLD: 0 /100 WBCS — SIGNIFICANT CHANGE UP (ref 0–0)
PH UR: 6.5 — SIGNIFICANT CHANGE UP (ref 5–8)
PLATELET # BLD AUTO: 235 K/UL — SIGNIFICANT CHANGE UP (ref 150–400)
POTASSIUM SERPL-MCNC: 4.6 MMOL/L — SIGNIFICANT CHANGE UP (ref 3.5–5.3)
POTASSIUM SERPL-SCNC: 4.6 MMOL/L — SIGNIFICANT CHANGE UP (ref 3.5–5.3)
PROT SERPL-MCNC: 7.1 G/DL — SIGNIFICANT CHANGE UP (ref 6–8.3)
PROT UR-MCNC: NEGATIVE — SIGNIFICANT CHANGE UP
RBC # BLD: 5.24 M/UL — SIGNIFICANT CHANGE UP (ref 4.2–5.8)
RBC # FLD: 12.6 % — SIGNIFICANT CHANGE UP (ref 10.3–14.5)
SODIUM SERPL-SCNC: 145 MMOL/L — SIGNIFICANT CHANGE UP (ref 135–145)
SP GR SPEC: 1.01 — SIGNIFICANT CHANGE UP (ref 1.01–1.02)
TROPONIN I, HIGH SENSITIVITY RESULT: 6.3 NG/L — SIGNIFICANT CHANGE UP
UROBILINOGEN FLD QL: NEGATIVE — SIGNIFICANT CHANGE UP
WBC # BLD: 6.62 K/UL — SIGNIFICANT CHANGE UP (ref 3.8–10.5)
WBC # FLD AUTO: 6.62 K/UL — SIGNIFICANT CHANGE UP (ref 3.8–10.5)

## 2023-07-31 PROCEDURE — 85025 COMPLETE CBC W/AUTO DIFF WBC: CPT

## 2023-07-31 PROCEDURE — 84484 ASSAY OF TROPONIN QUANT: CPT

## 2023-07-31 PROCEDURE — 80053 COMPREHEN METABOLIC PANEL: CPT

## 2023-07-31 PROCEDURE — 85379 FIBRIN DEGRADATION QUANT: CPT

## 2023-07-31 PROCEDURE — 93010 ELECTROCARDIOGRAM REPORT: CPT | Mod: 76

## 2023-07-31 PROCEDURE — 81001 URINALYSIS AUTO W/SCOPE: CPT

## 2023-07-31 PROCEDURE — 93005 ELECTROCARDIOGRAM TRACING: CPT

## 2023-07-31 PROCEDURE — 71045 X-RAY EXAM CHEST 1 VIEW: CPT | Mod: 26

## 2023-07-31 PROCEDURE — 71045 X-RAY EXAM CHEST 1 VIEW: CPT

## 2023-07-31 PROCEDURE — 36415 COLL VENOUS BLD VENIPUNCTURE: CPT

## 2023-07-31 PROCEDURE — 99285 EMERGENCY DEPT VISIT HI MDM: CPT | Mod: 25

## 2023-07-31 RX ORDER — AMLODIPINE BESYLATE 2.5 MG/1
1 TABLET ORAL
Qty: 14 | Refills: 0
Start: 2023-07-31

## 2023-07-31 RX ORDER — AMLODIPINE BESYLATE 2.5 MG/1
5 TABLET ORAL ONCE
Refills: 0 | Status: COMPLETED | OUTPATIENT
Start: 2023-07-31 | End: 2023-07-31

## 2023-07-31 RX ADMIN — AMLODIPINE BESYLATE 5 MILLIGRAM(S): 2.5 TABLET ORAL at 04:36

## 2023-07-31 NOTE — ED PROVIDER NOTE - PATIENT PORTAL LINK FT
You can access the FollowMyHealth Patient Portal offered by [facility] by registering at the following website: [email or web address]. By joining [employer]’s FollowMyHealth portal, you will also be able to view your health information using other applications (apps) compatible with our system.

## 2023-07-31 NOTE — ED PROVIDER NOTE - OBJECTIVE STATEMENT
73-year-old male states at 10:30 PM he felt mildly short of breath and checked his blood pressure recording 190/88 prompting him to come to ED.  On evaluation patient denies shortness of breath, no chest pain, no headache, no vision change, no nausea no vomiting.  Patient states he was informed by his PMD (Dr. Dias) 2 years ago that his blood pressure was elevated however patient was never started on antihypertensive medications.

## 2023-07-31 NOTE — ED PROVIDER NOTE - PR
Quality 431: Preventive Care And Screening: Unhealthy Alcohol Use - Screening: Patient screened for unhealthy alcohol use using a single question and scores less than 2 times per year Detail Level: Detailed Quality 130: Documentation Of Current Medications In The Medical Record: Current Medications Documented Quality 226: Preventive Care And Screening: Tobacco Use: Screening And Cessation Intervention: Patient screened for tobacco use and is an ex/non-smoker 148 ms

## 2023-07-31 NOTE — ED PROVIDER NOTE - NSFOLLOWUPINSTRUCTIONS_ED_ALL_ED_FT
High blood pressure (hypertension) is when the force of blood pumping through the arteries is too strong. The arteries are the blood vessels that carry blood from the heart throughout the body. Hypertension forces the heart to work harder to pump blood and may cause arteries to become narrow or stiff. Untreated or uncontrolled hypertension can lead to a heart attack, heart failure, a stroke, kidney disease, and other problems.    A blood pressure reading consists of a higher number over a lower number. Ideally, your blood pressure should be below 120/80. The first ("top") number is called the systolic pressure. It is a measure of the pressure in your arteries as your heart beats. The second ("bottom") number is called the diastolic pressure. It is a measure of the pressure in your arteries as the heart relaxes.    What are the causes?  The exact cause of this condition is not known. There are some conditions that result in high blood pressure.    What increases the risk?  Certain factors may make you more likely to develop high blood pressure. Some of these risk factors are under your control, including:  Smoking.  Not getting enough exercise or physical activity.  Being overweight.  Having too much fat, sugar, calories, or salt (sodium) in your diet.  Drinking too much alcohol.  Other risk factors include:  Having a personal history of heart disease, diabetes, high cholesterol, or kidney disease.  Stress.  Having a family history of high blood pressure and high cholesterol.  Having obstructive sleep apnea.  Age. The risk increases with age.  What are the signs or symptoms?  High blood pressure may not cause symptoms. Very high blood pressure (hypertensive crisis) may cause:  Headache.  Fast or irregular heartbeats (palpitations).  Shortness of breath.  Nosebleed.  Nausea and vomiting.  Vision changes.  Severe chest pain, dizziness, and seizures.  How is this diagnosed?  This condition is diagnosed by measuring your blood pressure while you are seated, with your arm resting on a flat surface, your legs uncrossed, and your feet flat on the floor. The cuff of the blood pressure monitor will be placed directly against the skin of your upper arm at the level of your heart. Blood pressure should be measured at least twice using the same arm. Certain conditions can cause a difference in blood pressure between your right and left arms.    If you have a high blood pressure reading during one visit or you have normal blood pressure with other risk factors, you may be asked to:  Return on a different day to have your blood pressure checked again.  Monitor your blood pressure at home for 1 week or longer.  If you are diagnosed with hypertension, you may have other blood or imaging tests to help your health care provider understand your overall risk for other conditions.    How is this treated?  This condition is treated by making healthy lifestyle changes, such as eating healthy foods, exercising more, and reducing your alcohol intake. You may be referred for counseling on a healthy diet and physical activity.    Your health care provider may prescribe medicine if lifestyle changes are not enough to get your blood pressure under control and if:  Your systolic blood pressure is above 130.  Your diastolic blood pressure is above 80.  Your personal target blood pressure may vary depending on your medical conditions, your age, and other factors.    Follow these instructions at home:  Eating and drinking    A plate with examples of foods in a healthy diet.  Eat a diet that is high in fiber and potassium, and low in sodium, added sugar, and fat. An example of this eating plan is called the DASH diet. DASH stands for Dietary Approaches to Stop Hypertension. To eat this way:  Eat plenty of fresh fruits and vegetables. Try to fill one half of your plate at each meal with fruits and vegetables.  Eat whole grains, such as whole-wheat pasta, brown rice, or whole-grain bread. Fill about one fourth of your plate with whole grains.  Eat or drink low-fat dairy products, such as skim milk or low-fat yogurt.  Avoid fatty cuts of meat, processed or cured meats, and poultry with skin. Fill about one fourth of your plate with lean proteins, such as fish, chicken without skin, beans, eggs, or tofu.  Avoid pre-made and processed foods. These tend to be higher in sodium, added sugar, and fat.  Reduce your daily sodium intake. Many people with hypertension should eat less than 1,500 mg of sodium a day.  Do not drink alcohol if:  Your health care provider tells you not to drink.  You are pregnant, may be pregnant, or are planning to become pregnant.  If you drink alcohol:  Limit how much you have to:  0–1 drink a day for women.  0–2 drinks a day for men.  Know how much alcohol is in your drink. In the [place] one drink equals one 12 oz bottle of beer (355 mL), one 5 oz glass of wine (148 mL), or one 1½ oz glass of hard liquor (44 mL).  Lifestyle    A blood pressure monitor and cuff.   Work with your health care provider to maintain a healthy body weight or to lose weight. Ask what an ideal weight is for you.  Get at least 30 minutes of exercise that causes your heart to beat faster (aerobic exercise) most days of the week. Activities may include walking, swimming, or biking.  Include exercise to strengthen your muscles (resistance exercise), such as Pilates or lifting weights, as part of your weekly exercise routine. Try to do these types of exercises for 30 minutes at least 3 days a week.  Do not use any products that contain nicotine or tobacco. These products include cigarettes, chewing tobacco, and vaping devices, such as e-cigarettes. If you need help quitting, ask your health care provider.  Monitor your blood pressure at home as told by your health care provider.  Keep all follow-up visits. This is important.  Medicines    Take over-the-counter and prescription medicines only as told by your health care provider. Follow directions carefully. Blood pressure medicines must be taken as prescribed.  Do not skip doses of blood pressure medicine. Doing this puts you at risk for problems and can make the medicine less effective.  Ask your health care provider about side effects or reactions to medicines that you should watch for.  Contact a health care provider if you:  Think you are having a reaction to a medicine you are taking.  Have headaches that keep coming back (recurring).  Feel dizzy.  Have swelling in your ankles.  Have trouble with your vision.  Get help right away if you:  Develop a severe headache or confusion.  Have unusual weakness or numbness.  Feel faint.  Have severe pain in your chest or abdomen.  Vomit repeatedly.  Have trouble breathing.  These symptoms may be an emergency. Get help right away. Call 911.  Do not wait to see if the symptoms will go away.  Do not drive yourself to the hospital.  Summary  Hypertension is when the force of blood pumping through your arteries is too strong. If this condition is not controlled, it may put you at risk for serious complications.  Your personal target blood pressure may vary depending on your medical conditions, your age, and other factors. For most people, a normal blood pressure is less than 120/80.  Hypertension is treated with lifestyle changes, medicines, or a combination of both. Lifestyle changes include losing weight, eating a healthy, low-sodium diet, exercising more, and limiting alcohol.  This information is not intended to replace advice given to you by your health care provider. Make sure you discuss any questions you have with your health care provider.

## 2023-07-31 NOTE — ED PROVIDER NOTE - PROGRESS NOTE DETAILS
Discussed with radiology Dr Singleton, CXR findings likely chronic patient reporting feeling improved, tolerating PO will discharge.  Nikolay Lind M.D.

## 2023-07-31 NOTE — ED ADULT NURSE NOTE - NSFALLUNIVINTERV_ED_ALL_ED
Abdiel Pedroza is a 44 year old male presenting for a medication check.    B/P taken at home 134/75    Medication verified and med list updated  Patient would like communication of their results via:     [employer]    Cell Phone:   Telephone Information:   Mobile [phone]     Okay to leave a message containing results? Yes    Letter       Bed/Stretcher in lowest position, wheels locked, appropriate side rails in place/Call bell, personal items and telephone in reach/Instruct patient to call for assistance before getting out of bed/chair/stretcher/Non-slip footwear applied when patient is off stretcher/[place] to call system/Physically safe environment - no spills, clutter or unnecessary equipment/Purposeful proactive rounding/Room/bathroom lighting operational, light cord in reach

## 2023-07-31 NOTE — ED PROVIDER NOTE - WR INTERPRETATION 1
increased interstital markings, s/p surgical clipings (gastric ulcer and hiatal hernia repair 40 years ago)

## 2023-07-31 NOTE — ED ADULT NURSE NOTE - OBJECTIVE STATEMENT
73-year-old male states at 10:30 PM he felt mildly short of breath and checked his blood pressure recording 190/88 prompting him to come to ED.

## 2023-09-15 NOTE — PATIENT PROFILE ADULT - HEALTH LITERACY UNDERSTANDING DOCTOR- DETAILS
09/15/23 0904   Encounter Summary   Encounter Overview/Reason  Spiritual/Emotional Needs   Service Provided For: Patient   Referral/Consult From: Nurse   Support System Spouse; Children   Last Encounter  09/15/23   Begin Time 0850   End Time  0905   Total Time Calculated 15 min   Spiritual/Emotional needs   Type Spiritual Support   Assessment/Intervention/Outcome   Assessment Calm   Intervention Active listening;Sustaining Presence/Ministry of presence   Outcome Expressed Gratitude      encountered pt as a consult for \"spiritual distress. \" Pt tired from being up all night. Says he \"saw a \" yesterday (that was helpful). Very open and kind gentleman. Pt informed  he is in and out of hospital often for treatment.  encouraged pt to reach out to Spiritual Care at any time. does not understand sometimes

## 2023-10-09 NOTE — ED ADULT NURSE NOTE - HISTORY OF COVID-19 VACCINATION
Medication(s) Requested: gabapentin  Last office visit: 9/20/23  Scheduled appointment 12/27/23  Last Dispensed: 9/19/23  Is the patient due for refill of this medication(s): No   PDMP review: Criteria not met. Forwarded to Physician/EARNEST for further review and decision on medication(s) requested.         
No

## 2024-02-16 NOTE — ED ADULT NURSE NOTE - NS PRO PASSIVE SMOKE EXP
Goal Outcome Evaluation:  Patient AO x4. Room air. Pt was anxious at the beginning of shift, michael YATES. PRN atarax ordered and administered. Q2 turn. Incontinent x2. Had dialysis, [id number], 2 liters out. Will have dialysis today. K protocol. VSS.       Problem: Adult Inpatient Plan of Care  Goal: Optimal Comfort and Wellbeing  Outcome: Progressing     Problem: Gas Exchange Impaired  Goal: Optimal Gas Exchange  Outcome: Progressing                         No

## 2024-10-28 ENCOUNTER — TRANSCRIPTION ENCOUNTER (OUTPATIENT)
Age: 74
End: 2024-10-28

## 2024-10-28 ENCOUNTER — INPATIENT (INPATIENT)
Facility: HOSPITAL | Age: 74
LOS: 5 days | Discharge: HOME | DRG: 390 | End: 2024-11-03
Attending: STUDENT IN AN ORGANIZED HEALTH CARE EDUCATION/TRAINING PROGRAM | Admitting: STUDENT IN AN ORGANIZED HEALTH CARE EDUCATION/TRAINING PROGRAM
Payer: COMMERCIAL

## 2024-10-28 ENCOUNTER — RESULT REVIEW (OUTPATIENT)
Age: 74
End: 2024-10-28

## 2024-10-28 VITALS
HEART RATE: 72 BPM | OXYGEN SATURATION: 97 % | RESPIRATION RATE: 18 BRPM | DIASTOLIC BLOOD PRESSURE: 89 MMHG | SYSTOLIC BLOOD PRESSURE: 172 MMHG | TEMPERATURE: 99 F | WEIGHT: 149.91 LBS

## 2024-10-28 DIAGNOSIS — K45.8 OTHER SPECIFIED ABDOMINAL HERNIA WITHOUT OBSTRUCTION OR GANGRENE: ICD-10-CM

## 2024-10-28 DIAGNOSIS — Z98.0 INTESTINAL BYPASS AND ANASTOMOSIS STATUS: Chronic | ICD-10-CM

## 2024-10-28 DIAGNOSIS — K56.609 UNSPECIFIED INTESTINAL OBSTRUCTION, UNSPECIFIED AS TO PARTIAL VERSUS COMPLETE OBSTRUCTION: ICD-10-CM

## 2024-10-28 DIAGNOSIS — Z90.49 ACQUIRED ABSENCE OF OTHER SPECIFIED PARTS OF DIGESTIVE TRACT: Chronic | ICD-10-CM

## 2024-10-28 DIAGNOSIS — Z90.3 ACQUIRED ABSENCE OF STOMACH [PART OF]: Chronic | ICD-10-CM

## 2024-10-28 LAB
ALBUMIN SERPL ELPH-MCNC: 3.9 G/DL — SIGNIFICANT CHANGE UP (ref 3.5–5)
ALP SERPL-CCNC: 108 U/L — SIGNIFICANT CHANGE UP (ref 40–120)
ALT FLD-CCNC: 24 U/L DA — SIGNIFICANT CHANGE UP (ref 10–60)
ANION GAP SERPL CALC-SCNC: 5 MMOL/L — SIGNIFICANT CHANGE UP (ref 5–17)
ANION GAP SERPL CALC-SCNC: 6 MMOL/L — SIGNIFICANT CHANGE UP (ref 5–17)
APPEARANCE UR: CLEAR — SIGNIFICANT CHANGE UP
APTT BLD: 26.3 SEC — SIGNIFICANT CHANGE UP (ref 24.5–35.6)
AST SERPL-CCNC: 25 U/L — SIGNIFICANT CHANGE UP (ref 10–40)
BASOPHILS # BLD AUTO: 0.03 K/UL — SIGNIFICANT CHANGE UP (ref 0–0.2)
BASOPHILS NFR BLD AUTO: 0.2 % — SIGNIFICANT CHANGE UP (ref 0–2)
BILIRUB SERPL-MCNC: 0.6 MG/DL — SIGNIFICANT CHANGE UP (ref 0.2–1.2)
BILIRUB UR-MCNC: NEGATIVE — SIGNIFICANT CHANGE UP
BLD GP AB SCN SERPL QL: SIGNIFICANT CHANGE UP
BUN SERPL-MCNC: 13 MG/DL — SIGNIFICANT CHANGE UP (ref 7–18)
BUN SERPL-MCNC: 15 MG/DL — SIGNIFICANT CHANGE UP (ref 7–18)
CALCIUM SERPL-MCNC: 7.6 MG/DL — LOW (ref 8.4–10.5)
CALCIUM SERPL-MCNC: 9.2 MG/DL — SIGNIFICANT CHANGE UP (ref 8.4–10.5)
CHLORIDE SERPL-SCNC: 107 MMOL/L — SIGNIFICANT CHANGE UP (ref 96–108)
CHLORIDE SERPL-SCNC: 110 MMOL/L — HIGH (ref 96–108)
CO2 SERPL-SCNC: 25 MMOL/L — SIGNIFICANT CHANGE UP (ref 22–31)
CO2 SERPL-SCNC: 28 MMOL/L — SIGNIFICANT CHANGE UP (ref 22–31)
COLOR SPEC: YELLOW — SIGNIFICANT CHANGE UP
CREAT SERPL-MCNC: 1.19 MG/DL — SIGNIFICANT CHANGE UP (ref 0.5–1.3)
CREAT SERPL-MCNC: 1.25 MG/DL — SIGNIFICANT CHANGE UP (ref 0.5–1.3)
DIFF PNL FLD: ABNORMAL
EGFR: 60 ML/MIN/1.73M2 — SIGNIFICANT CHANGE UP
EGFR: 64 ML/MIN/1.73M2 — SIGNIFICANT CHANGE UP
EOSINOPHIL # BLD AUTO: 0.01 K/UL — SIGNIFICANT CHANGE UP (ref 0–0.5)
EOSINOPHIL NFR BLD AUTO: 0.1 % — SIGNIFICANT CHANGE UP (ref 0–6)
EPI CELLS # UR: PRESENT
GLUCOSE SERPL-MCNC: 156 MG/DL — HIGH (ref 70–99)
GLUCOSE SERPL-MCNC: 210 MG/DL — HIGH (ref 70–99)
GLUCOSE UR QL: NEGATIVE MG/DL — SIGNIFICANT CHANGE UP
HCT VFR BLD CALC: 39.6 % — SIGNIFICANT CHANGE UP (ref 39–50)
HCT VFR BLD CALC: 46.8 % — SIGNIFICANT CHANGE UP (ref 39–50)
HGB BLD-MCNC: 13.6 G/DL — SIGNIFICANT CHANGE UP (ref 13–17)
HGB BLD-MCNC: 16.1 G/DL — SIGNIFICANT CHANGE UP (ref 13–17)
IMM GRANULOCYTES NFR BLD AUTO: 0.3 % — SIGNIFICANT CHANGE UP (ref 0–0.9)
INR BLD: 1.07 RATIO — SIGNIFICANT CHANGE UP (ref 0.85–1.16)
KETONES UR-MCNC: ABNORMAL MG/DL
LEUKOCYTE ESTERASE UR-ACNC: NEGATIVE — SIGNIFICANT CHANGE UP
LIDOCAIN IGE QN: 53 U/L — SIGNIFICANT CHANGE UP (ref 13–75)
LYMPHOCYTES # BLD AUTO: 1.19 K/UL — SIGNIFICANT CHANGE UP (ref 1–3.3)
LYMPHOCYTES # BLD AUTO: 8.9 % — LOW (ref 13–44)
MAGNESIUM SERPL-MCNC: 1.6 MG/DL — SIGNIFICANT CHANGE UP (ref 1.6–2.6)
MCHC RBC-ENTMCNC: 28.7 PG — SIGNIFICANT CHANGE UP (ref 27–34)
MCHC RBC-ENTMCNC: 28.8 PG — SIGNIFICANT CHANGE UP (ref 27–34)
MCHC RBC-ENTMCNC: 34.3 GM/DL — SIGNIFICANT CHANGE UP (ref 32–36)
MCHC RBC-ENTMCNC: 34.4 GM/DL — SIGNIFICANT CHANGE UP (ref 32–36)
MCV RBC AUTO: 83.5 FL — SIGNIFICANT CHANGE UP (ref 80–100)
MCV RBC AUTO: 83.7 FL — SIGNIFICANT CHANGE UP (ref 80–100)
MONOCYTES # BLD AUTO: 1.01 K/UL — HIGH (ref 0–0.9)
MONOCYTES NFR BLD AUTO: 7.6 % — SIGNIFICANT CHANGE UP (ref 2–14)
NEUTROPHILS # BLD AUTO: 11.02 K/UL — HIGH (ref 1.8–7.4)
NEUTROPHILS NFR BLD AUTO: 82.9 % — HIGH (ref 43–77)
NITRITE UR-MCNC: NEGATIVE — SIGNIFICANT CHANGE UP
NRBC # BLD: 0 /100 WBCS — SIGNIFICANT CHANGE UP (ref 0–0)
NRBC # BLD: 0 /100 WBCS — SIGNIFICANT CHANGE UP (ref 0–0)
PH UR: 5.5 — SIGNIFICANT CHANGE UP (ref 5–8)
PHOSPHATE SERPL-MCNC: 3.3 MG/DL — SIGNIFICANT CHANGE UP (ref 2.5–4.5)
PLATELET # BLD AUTO: 231 K/UL — SIGNIFICANT CHANGE UP (ref 150–400)
PLATELET # BLD AUTO: 261 K/UL — SIGNIFICANT CHANGE UP (ref 150–400)
POTASSIUM SERPL-MCNC: 4.2 MMOL/L — SIGNIFICANT CHANGE UP (ref 3.5–5.3)
POTASSIUM SERPL-MCNC: 5.2 MMOL/L — SIGNIFICANT CHANGE UP (ref 3.5–5.3)
POTASSIUM SERPL-SCNC: 4.2 MMOL/L — SIGNIFICANT CHANGE UP (ref 3.5–5.3)
POTASSIUM SERPL-SCNC: 5.2 MMOL/L — SIGNIFICANT CHANGE UP (ref 3.5–5.3)
PROT SERPL-MCNC: 7.7 G/DL — SIGNIFICANT CHANGE UP (ref 6–8.3)
PROT UR-MCNC: 30 MG/DL
PROTHROM AB SERPL-ACNC: 12.5 SEC — SIGNIFICANT CHANGE UP (ref 9.9–13.4)
RBC # BLD: 4.74 M/UL — SIGNIFICANT CHANGE UP (ref 4.2–5.8)
RBC # BLD: 5.59 M/UL — SIGNIFICANT CHANGE UP (ref 4.2–5.8)
RBC # FLD: 14 % — SIGNIFICANT CHANGE UP (ref 10.3–14.5)
RBC # FLD: 14.4 % — SIGNIFICANT CHANGE UP (ref 10.3–14.5)
RBC CASTS # UR COMP ASSIST: 1 /HPF — SIGNIFICANT CHANGE UP (ref 0–4)
SODIUM SERPL-SCNC: 140 MMOL/L — SIGNIFICANT CHANGE UP (ref 135–145)
SODIUM SERPL-SCNC: 141 MMOL/L — SIGNIFICANT CHANGE UP (ref 135–145)
SP GR SPEC: 1.03 — SIGNIFICANT CHANGE UP (ref 1–1.03)
TROPONIN I, HIGH SENSITIVITY RESULT: 4.4 NG/L — SIGNIFICANT CHANGE UP
UROBILINOGEN FLD QL: 1 MG/DL — SIGNIFICANT CHANGE UP (ref 0.2–1)
WBC # BLD: 12.14 K/UL — HIGH (ref 3.8–10.5)
WBC # BLD: 13.3 K/UL — HIGH (ref 3.8–10.5)
WBC # FLD AUTO: 12.14 K/UL — HIGH (ref 3.8–10.5)
WBC # FLD AUTO: 13.3 K/UL — HIGH (ref 3.8–10.5)
WBC UR QL: 0 /HPF — SIGNIFICANT CHANGE UP (ref 0–5)

## 2024-10-28 PROCEDURE — 99222 1ST HOSP IP/OBS MODERATE 55: CPT | Mod: 57

## 2024-10-28 PROCEDURE — 93010 ELECTROCARDIOGRAM REPORT: CPT

## 2024-10-28 PROCEDURE — 44120 REMOVAL OF SMALL INTESTINE: CPT

## 2024-10-28 PROCEDURE — 44120 REMOVAL OF SMALL INTESTINE: CPT | Mod: 80

## 2024-10-28 PROCEDURE — 99291 CRITICAL CARE FIRST HOUR: CPT

## 2024-10-28 PROCEDURE — 88307 TISSUE EXAM BY PATHOLOGIST: CPT | Mod: 26

## 2024-10-28 PROCEDURE — 74177 CT ABD & PELVIS W/CONTRAST: CPT | Mod: 26,MC

## 2024-10-28 DEVICE — STAPLER COVIDIEN ENDO GIA 80-3.8MM BLUE RELOAD: Type: IMPLANTABLE DEVICE | Status: FUNCTIONAL

## 2024-10-28 DEVICE — IMPLANTABLE DEVICE: Type: IMPLANTABLE DEVICE | Status: FUNCTIONAL

## 2024-10-28 DEVICE — CLIP APPLIER COVIDIEN SURGICLIP 13" LARGE: Type: IMPLANTABLE DEVICE | Status: FUNCTIONAL

## 2024-10-28 DEVICE — STAPLER COVIDIEN TA 60 BLUE RELOAD: Type: IMPLANTABLE DEVICE | Status: FUNCTIONAL

## 2024-10-28 DEVICE — STAPLER COVIDIEN ENDO GIA 80-3.8MM BLUE: Type: IMPLANTABLE DEVICE | Status: FUNCTIONAL

## 2024-10-28 DEVICE — STAPLER COVIDIEN EEA CIRCULAR DST 28MM 3.5MM BLUE: Type: IMPLANTABLE DEVICE | Status: FUNCTIONAL

## 2024-10-28 DEVICE — STAPLER COVIDIEN TA 60 BLUE: Type: IMPLANTABLE DEVICE | Status: FUNCTIONAL

## 2024-10-28 DEVICE — STAPLER COVIDIEN EEA CIRCULAR DST 28MM 4.5MM BLUE: Type: IMPLANTABLE DEVICE | Status: FUNCTIONAL

## 2024-10-28 DEVICE — STAPLER COVIDIEN DST EEA 31MM 4.8MM: Type: IMPLANTABLE DEVICE | Status: FUNCTIONAL

## 2024-10-28 DEVICE — CLIP APPLIER COVIDIEN SURGICLIP III 9" SM: Type: IMPLANTABLE DEVICE | Status: FUNCTIONAL

## 2024-10-28 DEVICE — STAPLER COVIDIEN PURSTRING 65MM: Type: IMPLANTABLE DEVICE | Status: FUNCTIONAL

## 2024-10-28 DEVICE — CLIP APPLIER COVIDIEN SURGICLIP II 9.75" MEDIUM: Type: IMPLANTABLE DEVICE | Status: FUNCTIONAL

## 2024-10-28 RX ORDER — SODIUM CHLORIDE 9 MG/ML
1000 INJECTION, SOLUTION INTRAMUSCULAR; INTRAVENOUS; SUBCUTANEOUS ONCE
Refills: 0 | Status: COMPLETED | OUTPATIENT
Start: 2024-10-28 | End: 2024-10-28

## 2024-10-28 RX ORDER — ONDANSETRON HYDROCHLORIDE 2 MG/ML
4 INJECTION, SOLUTION INTRAMUSCULAR; INTRAVENOUS ONCE
Refills: 0 | Status: DISCONTINUED | OUTPATIENT
Start: 2024-10-28 | End: 2024-10-29

## 2024-10-28 RX ORDER — ONDANSETRON HYDROCHLORIDE 2 MG/ML
4 INJECTION, SOLUTION INTRAMUSCULAR; INTRAVENOUS EVERY 6 HOURS
Refills: 0 | Status: DISCONTINUED | OUTPATIENT
Start: 2024-10-28 | End: 2024-10-28

## 2024-10-28 RX ORDER — NALOXONE HYDROCHLORIDE 1 MG/ML
0.1 INJECTION, SOLUTION INTRAMUSCULAR; INTRAVENOUS; SUBCUTANEOUS
Refills: 0 | Status: DISCONTINUED | OUTPATIENT
Start: 2024-10-28 | End: 2024-11-03

## 2024-10-28 RX ORDER — ACETAMINOPHEN 500 MG
1000 TABLET ORAL EVERY 6 HOURS
Refills: 0 | Status: COMPLETED | OUTPATIENT
Start: 2024-10-28 | End: 2024-10-29

## 2024-10-28 RX ORDER — ONDANSETRON HYDROCHLORIDE 2 MG/ML
4 INJECTION, SOLUTION INTRAMUSCULAR; INTRAVENOUS ONCE
Refills: 0 | Status: COMPLETED | OUTPATIENT
Start: 2024-10-28 | End: 2024-10-28

## 2024-10-28 RX ORDER — ONDANSETRON HYDROCHLORIDE 2 MG/ML
4 INJECTION, SOLUTION INTRAMUSCULAR; INTRAVENOUS EVERY 6 HOURS
Refills: 0 | Status: DISCONTINUED | OUTPATIENT
Start: 2024-10-28 | End: 2024-11-03

## 2024-10-28 RX ORDER — PANTOPRAZOLE SODIUM 40 MG/1
40 TABLET, DELAYED RELEASE ORAL
Refills: 0 | Status: DISCONTINUED | OUTPATIENT
Start: 2024-10-28 | End: 2024-10-30

## 2024-10-28 RX ORDER — HYDROMORPHONE HCL/0.9% NACL/PF 6 MG/30 ML
30 PATIENT CONTROLLED ANALGESIA SYRINGE INTRAVENOUS
Refills: 0 | Status: DISCONTINUED | OUTPATIENT
Start: 2024-10-28 | End: 2024-10-30

## 2024-10-28 RX ORDER — FENTANYL CITRAT/DEXTROSE 5%/PF 1250MCG/50
25 PATIENT CONTROLLED ANALGESIA SYRINGE INTRAVENOUS
Refills: 0 | Status: DISCONTINUED | OUTPATIENT
Start: 2024-10-28 | End: 2024-10-29

## 2024-10-28 RX ORDER — HYDROMORPHONE HCL/0.9% NACL/PF 6 MG/30 ML
0.5 PATIENT CONTROLLED ANALGESIA SYRINGE INTRAVENOUS
Refills: 0 | Status: DISCONTINUED | OUTPATIENT
Start: 2024-10-28 | End: 2024-10-29

## 2024-10-28 RX ORDER — B-COMPLEX WITH VITAMIN C
1 VIAL (ML) INJECTION DAILY
Refills: 0 | Status: DISCONTINUED | OUTPATIENT
Start: 2024-10-28 | End: 2024-11-03

## 2024-10-28 RX ADMIN — Medication 30 MILLILITER(S): at 23:26

## 2024-10-28 RX ADMIN — SODIUM CHLORIDE 1000 MILLILITER(S): 9 INJECTION, SOLUTION INTRAMUSCULAR; INTRAVENOUS; SUBCUTANEOUS at 12:36

## 2024-10-28 RX ADMIN — ONDANSETRON HYDROCHLORIDE 4 MILLIGRAM(S): 2 INJECTION, SOLUTION INTRAMUSCULAR; INTRAVENOUS at 12:34

## 2024-10-28 NOTE — PACU DISCHARGE NOTE - COMMENTS
no anesthesia complications noted   2L O2 NC no anesthesia complications noted   2L O2 NC, cont pulse ox for IV PCA

## 2024-10-28 NOTE — H&P ADULT - NS ATTEND AMEND GEN_ALL_CORE FT
I have personally seen and examined the patient with surgical team. Agree with the history, physical exam, and plan as documented by the PA.     In summary, patient is a 73yo with h/o B2 reconstruction for PUD ~40yrs ago and previous history of GI bleed presenting with one day history of generalized abdominal pain, with associated nausea and multiple bouts of emesis. Patient seen at bedside found hemodynamically stable accompanied by his wife. Last BM was this AM however denies passing gas. Vitals are WNL, labs shows leukocytosis, K 5.2, Cr 1.2. Abdomen distended and tympanic, depressible, mildly tender to palpation. CT shows possible internal hernia/closed loop obstruction with possible mesenteric edema and ischemic changes. At this time, recommendation for emergent surgery was address due to findings that were discussed in detail, all risks/benefits of procedure d/w patient who agrees to proceed with exploratory laparotomy, SHANON and possible small bowel resection. All questions and concerns were addressed.

## 2024-10-28 NOTE — ED ADULT NURSE NOTE - OBJECTIVE STATEMENT
Patient presents with c/o abdominal pain x yesterday, nausea, multiple episode of vomiting x this morning.

## 2024-10-28 NOTE — ED PROVIDER NOTE - PHYSICAL EXAMINATION
distended abdomen epigastric tenderness palpation heart regular lungs clear awake alert.  Noted to be retching at bedside.  Bilious vomitus in emesis bag

## 2024-10-28 NOTE — ED ADULT TRIAGE NOTE - HEART RATE (BEATS/MIN)
General: Wmild distress, appears stated age  HEENT: normocephalic, atraumatic   Respiratory: normal work of breathing  MSK: no swelling or tenderness of lower extremities, moving all extremities spontaneously   Skin: warm, dry  Neuro: A&Ox3, cranial nerves II-XII intact, 5/5 strength in all extremities, no sensory deficits, normal gait   Psych: appropriate affect  ABD: soft, + tender epigastrum, nondistended, no guarding, no rebound, no CVA tenderness 72

## 2024-10-28 NOTE — ED PROVIDER NOTE - CCCP TRG CHIEF CMPLNT
Subjective


Date of Service: 03/27/20


Chief Complaint: Pneumonia





Remained febrile overnight. reports feeling better this morning. Still with 

pleuritic bilateral flank pain. SOB is stable. 


No new complaints. COVID19 ruled out. 





Physical Examination





- Vital Signs


Temperature: 100.8 F


Blood Pressure: 127/66


Pulse: 110


Respirations: 43


Pulse Ox (%): 95





- Physical Exam


General: Alert, In no apparent distress


HEENT: Atraumatic, PERRLA, EOMI


Neck: Supple, JVD not distended


Respiratory: Diminished, Dull, Crackles/rales


Cardiovascular: Normal S1 S2, Irregular heart rate/rhythm (Sinus tachycardia)


Capillary refill: <2 Seconds


Gastrointestinal: Normal bowel sounds, No tenderness


Musculoskeletal: No tenderness


Integumentary: No rashes


Neurological: Normal speech, Normal tone, Normal affect


Lymphatics: No axilla or inguinal lymphadenopathy





- Studies





 Laboratory Tests











  03/26/20 03/27/20 03/27/20





  10:06 05:38 05:38


 


Hgb   10.1 L 


 


Hct   29.9 L 


 


Plt Count   123 L D 


 


Neutrophils %   88.6 H 


 


Toxic Granulation   1+ 


 


Dohle Bodies   Present 


 


Giant Platelets   Present 


 


Absolute Retic  0.01 L  


 


Percent Retic  0.25 L  


 


PT   


 


INR   


 


APTT   


 


Sodium    136


 


Potassium    3.9


 


Chloride    104


 


BUN    16


 


Total Bilirubin    1.8 H


 


AST    38 H


 


Procalcitonin   














  03/27/20 03/27/20





  05:38 05:38


 


Hgb  


 


Hct  


 


Plt Count  


 


Neutrophils %  


 


Toxic Granulation  


 


Dohle Bodies  


 


Giant Platelets  


 


Absolute Retic  


 


Percent Retic  


 


PT  28.1 H 


 


INR  2.42 


 


APTT  33.0 


 


Sodium  


 


Potassium  


 


Chloride  


 


BUN  


 


Total Bilirubin  


 


AST  


 


Procalcitonin   42.50 H











Microbiology Data (last 24 hrs): 








03/24/20 13:20   Blood  - Blood   Blood Culture Gram Stain - Final


03/24/20 13:26   Throat   Culture & Sensitivity - Final


                            NORMAL UPPER RESPIRATORY SEVERO GROWN.





Medications List Reviewed: Yes





Assessment & Plan


Physician Review: Patient Assessed, Agree with Above Assessment and Plan


Physician Review Additional Text: 


Mr. Grimes is 21 y/o male pw cough and sob.


#Severe sepsis 2/2 to pneumonia- Severe sepsis POA. Maintaining BP and MAP > 65


- Tbili & procal elevated. Lactic acid wnl. 


- IV hydration. Maintain perfusion


- Revised abx- on cefepime, vanc and diflucan added for fungal coverage. 

Consider merrem if no improvement or fever persists on cefepime.


- Continue supportive measures. 


#Pneumonia with bacteremia- CT chest with significant infiltrates, also 

concerning for septic embolic. TTE neg for vegetations. Leukocytosis worsen and 

procalcitonin improving.  Toxic granulation on differential.


- GPC in chains and clusters, still awaiting final result. 


- still febrile.  Monitor closely.


-COVID19 ruled out.


-Influenza negative.


-Elevated T bili-hemolysis ruled out


-consulted Infectious Disease and pulmonologist for assistance.  Recommendation 

appreciated


#Acute respiratory distress-Due to pnuemonia. BIPAP PRN 


-patient is tachypneic and tachycardic as well.


-low threshold for intubation.  


-Duoneb, pulm toileting.


-serial CXR as needed. 


-bilateral pleural effusion, follow closely


#Coagulopathy secondary to sepsis-DIC is unlikely due to elevated fibrinogen, 

unless of subclinical DIC.  PT/INR and PTT is prolonged. 


-no significant bleeding.


-tea-colored urine noted. Hematuria rule dout


-treat underlining etiology.  No indication for reversal of coagulopathy.


-monitor for bleeding 


-discussed with hematologist on call.


#Thrombocytopenia- due to sepsis. Improving. 


#Electrolyte imbalance-hypokalemia and hyponatremia secondary to hypovolemia.


-will replace.  


-monitor closely with daily labs.


#Hyperglycemia-secondary to sepsis.  Patient denies prior history of diabetes.


#Vomiting and diarrhea-now resolved





DVT ppx- SCD


Patient is full code. 


Dispo- inpatient abdominal pain

## 2024-10-28 NOTE — ED ADULT NURSE NOTE - NS ED NOTE ABUSE RESPONSE YN
SW:  I BRISSA spoke with Uzma at [facility] to discuss possible TCU placement for pt on discharge. [facility]  will continue to follow pt and anticipate discharge in next day or two. Uzma has indicated that they will have a bed for pt.  Pt had sitter discontinued this am.  P BRISSA following.   Yes

## 2024-10-28 NOTE — ED PROVIDER NOTE - CLINICAL SUMMARY MEDICAL DECISION MAKING FREE TEXT BOX
epigastric tenderness palpation distended abdomen.  Concern for possible bowel obstruction.  Will do CAT scan labs pain control fluids reassess

## 2024-10-28 NOTE — ED PROVIDER NOTE - LANGUAGE ASSISTANCE NEEDED
MAKENZIE  - [facility]/No-Patient/Caregiver offered and refused free interpretation services.

## 2024-10-28 NOTE — ED ADULT TRIAGE NOTE - AS TEMP SITE
Anesthesia Pre Eval Note    OB Evaluation    Anesthesia ROS/Med Hx        Anesthetic Complication History:  Patient does not have a history of anesthetic complications      Pulmonary Review:  Patient does not have a pulmonary history      Neuro/Psych Review:  Patient does not have a neuro/psych history         Cardiovascular Review:  Patient does not have a cardiovascular history       GI/HEPATIC/RENAL Review:  Patient does not have a GI/hepatic/renalhistory       End/Other Review:  Patient does not have an endo/other history      Overall Review of Systems Comments:  33 yo Mandarin Speaking Female w/Breech Presentation and Full Labor/Dilation.   (38 weeks).  STAT .  Otherwise healthy      Relevant Problems   No relevant active problems       Physical Exam     Airway   Mallampati: II  TM Distance: >3 FB  Neck ROM: Full  Neck: Non-tender and Able to place in sniff position  TMJ Mobility: Good    Cardiovascular  Cardiovascular exam normal  Cardio Rhythm: Regular  Cardio Rate: Normal    Head Assessment  Head assessment: Normocephalic and Atraumatic    General Assessment  General Assessment: Alert and oriented and No acute distress    Dental Exam  Dental exam normal    Pulmonary Exam  Pulmonary exam normal  Breath sounds clear to auscultation:  Yes    Abdominal Exam  Abdominal exam normal      Anesthesia Plan:    ASA Status: 3Emergent    Anesthesia Type: General    Induction: Intravenous  Preferred Airway Type: ETT  Maintenance: Inhalational    Post-op Pain Management: Per Surgeon      Checklist  Reviewed: NPO Status, Allergies, Medications, Problem list and Past Med History  Consent/Risks Discussed Statement:  The proposed anesthetic plan, including its risks and benefits, have been discussed with the Patient along with the risks and benefits of alternatives. Questions were encouraged and answered and the patient and/or representative understands and agrees to proceed.        I discussed with the patient  (and/or patient's legal representative) the risks and benefits of the proposed anesthesia plan, General, which may include services performed by other anesthesia providers.    Alternative anesthesia plans, if available, were reviewed with the patient (and/or patient's legal representative). Discussion has been held with the patient (and/or patient's legal representative) regarding risks of anesthesia, which include Nausea, Vomiting and Dental Injury and emergent situations that may require change in anesthesia plan.    The patient (and/or patient's legal representative) has indicated understanding, his/her questions have been answered, and he/she wishes to proceed with the planned anesthetic.    Blood Products: Not Anticipated     oral

## 2024-10-28 NOTE — BRIEF OPERATIVE NOTE - NSICDXBRIEFPROCEDURE_GEN_ALL_CORE_FT
PROCEDURES:  Exploratory laparotomy 28-Oct-2024 23:01:25  Rolando Chaudhry  Small bowel resection 28-Oct-2024 23:02:03  Rolando Chaudhry

## 2024-10-28 NOTE — ED ADULT NURSE NOTE - LANGUAGE ASSISTANCE NEEDED
MAKENZIE  - [facility]/No-Patient/Caregiver offered and refused free interpretation services.

## 2024-10-28 NOTE — H&P ADULT - TIME BILLING
Time spent reviewing chart & records, VS, labs and imaging. Followed with face-to-face interview with patient and physical evaluation. Discussion with ED team/surgical team and RNs. Documentation completed as above.

## 2024-10-28 NOTE — ED ADULT NURSE NOTE - NSFALLUNIVINTERV_ED_ALL_ED
Assistance OOB with selected safe patient handling equipment if applicable/Bed/Stretcher in lowest position, wheels locked, appropriate side rails in place/Call bell, personal items and telephone in reach/Instruct patient to call for assistance before getting out of bed/chair/stretcher/Non-slip footwear applied when patient is off stretcher/[place] to call system/Physically safe environment - no spills, clutter or unnecessary equipment/Purposeful proactive rounding/Room/bathroom lighting operational, light cord in reach

## 2024-10-28 NOTE — H&P ADULT - ASSESSMENT
74 y.o. M with PMH gastric ulcer s/p Bilroth II with SBO    -Admit to surgery under Dr. Mcgovern  -Plan for ex-lap, poss SBR today  -Keep NPO  -IVF  -Pain control prn  -DVT ppx  -PPI

## 2024-10-28 NOTE — ED PROVIDER NOTE - OBJECTIVE STATEMENT
74-year-old male history of appendectomy surgery for gastric ulcer and additional  Oestreich surgery to "pull the stomach back down into the abdominal cavity" presents with vomiting and upper abdominal pain since last night.  Notes soft stool today.  No urinary symptoms no other complaints notes abdomen is distended

## 2024-10-28 NOTE — ED ADULT TRIAGE NOTE - LANGUAGE ASSISTANCE NEEDED
MAKENZIE  - [facility]/No-Patient/Caregiver offered and refused free interpretation services.

## 2024-10-29 LAB
ANION GAP SERPL CALC-SCNC: 8 MMOL/L — SIGNIFICANT CHANGE UP (ref 5–17)
BASOPHILS # BLD AUTO: 0.03 K/UL — SIGNIFICANT CHANGE UP (ref 0–0.2)
BASOPHILS NFR BLD AUTO: 0.2 % — SIGNIFICANT CHANGE UP (ref 0–2)
BUN SERPL-MCNC: 14 MG/DL — SIGNIFICANT CHANGE UP (ref 7–18)
CALCIUM SERPL-MCNC: 7.8 MG/DL — LOW (ref 8.4–10.5)
CHLORIDE SERPL-SCNC: 107 MMOL/L — SIGNIFICANT CHANGE UP (ref 96–108)
CO2 SERPL-SCNC: 24 MMOL/L — SIGNIFICANT CHANGE UP (ref 22–31)
CREAT SERPL-MCNC: 1.15 MG/DL — SIGNIFICANT CHANGE UP (ref 0.5–1.3)
CULTURE RESULTS: SIGNIFICANT CHANGE UP
EGFR: 67 ML/MIN/1.73M2 — SIGNIFICANT CHANGE UP
EOSINOPHIL # BLD AUTO: 0 K/UL — SIGNIFICANT CHANGE UP (ref 0–0.5)
EOSINOPHIL NFR BLD AUTO: 0 % — SIGNIFICANT CHANGE UP (ref 0–6)
GLUCOSE SERPL-MCNC: 194 MG/DL — HIGH (ref 70–99)
HCT VFR BLD CALC: 39.3 % — SIGNIFICANT CHANGE UP (ref 39–50)
HGB BLD-MCNC: 13.2 G/DL — SIGNIFICANT CHANGE UP (ref 13–17)
IMM GRANULOCYTES NFR BLD AUTO: 0.4 % — SIGNIFICANT CHANGE UP (ref 0–0.9)
LYMPHOCYTES # BLD AUTO: 0.84 K/UL — LOW (ref 1–3.3)
LYMPHOCYTES # BLD AUTO: 6.2 % — LOW (ref 13–44)
MAGNESIUM SERPL-MCNC: 1.9 MG/DL — SIGNIFICANT CHANGE UP (ref 1.6–2.6)
MCHC RBC-ENTMCNC: 28.4 PG — SIGNIFICANT CHANGE UP (ref 27–34)
MCHC RBC-ENTMCNC: 33.6 GM/DL — SIGNIFICANT CHANGE UP (ref 32–36)
MCV RBC AUTO: 84.7 FL — SIGNIFICANT CHANGE UP (ref 80–100)
MONOCYTES # BLD AUTO: 1.1 K/UL — HIGH (ref 0–0.9)
MONOCYTES NFR BLD AUTO: 8.1 % — SIGNIFICANT CHANGE UP (ref 2–14)
NEUTROPHILS # BLD AUTO: 11.61 K/UL — HIGH (ref 1.8–7.4)
NEUTROPHILS NFR BLD AUTO: 85.1 % — HIGH (ref 43–77)
NRBC # BLD: 0 /100 WBCS — SIGNIFICANT CHANGE UP (ref 0–0)
PHOSPHATE SERPL-MCNC: 3.6 MG/DL — SIGNIFICANT CHANGE UP (ref 2.5–4.5)
PLATELET # BLD AUTO: 203 K/UL — SIGNIFICANT CHANGE UP (ref 150–400)
POTASSIUM SERPL-MCNC: 4.2 MMOL/L — SIGNIFICANT CHANGE UP (ref 3.5–5.3)
POTASSIUM SERPL-SCNC: 4.2 MMOL/L — SIGNIFICANT CHANGE UP (ref 3.5–5.3)
RBC # BLD: 4.64 M/UL — SIGNIFICANT CHANGE UP (ref 4.2–5.8)
RBC # FLD: 14.6 % — HIGH (ref 10.3–14.5)
SODIUM SERPL-SCNC: 139 MMOL/L — SIGNIFICANT CHANGE UP (ref 135–145)
SPECIMEN SOURCE: SIGNIFICANT CHANGE UP
WBC # BLD: 13.63 K/UL — HIGH (ref 3.8–10.5)
WBC # FLD AUTO: 13.63 K/UL — HIGH (ref 3.8–10.5)

## 2024-10-29 RX ORDER — HEPARIN SODIUM 10000 [USP'U]/ML
5000 INJECTION INTRAVENOUS; SUBCUTANEOUS EVERY 8 HOURS
Refills: 0 | Status: DISCONTINUED | OUTPATIENT
Start: 2024-10-29 | End: 2024-11-03

## 2024-10-29 RX ORDER — INFLUENZ VIR VAC TV P-SURF2003 15MCG/.5ML
0.5 SYRINGE (ML) INTRAMUSCULAR ONCE
Refills: 0 | Status: COMPLETED | OUTPATIENT
Start: 2024-10-29 | End: 2024-11-03

## 2024-10-29 RX ADMIN — Medication 400 MILLIGRAM(S): at 06:15

## 2024-10-29 RX ADMIN — HEPARIN SODIUM 5000 UNIT(S): 10000 INJECTION INTRAVENOUS; SUBCUTANEOUS at 21:55

## 2024-10-29 RX ADMIN — Medication 30 MILLILITER(S): at 00:06

## 2024-10-29 RX ADMIN — Medication 400 MILLIGRAM(S): at 21:55

## 2024-10-29 RX ADMIN — Medication 1000 MILLIGRAM(S): at 22:30

## 2024-10-29 RX ADMIN — Medication 100 MILLILITER(S): at 15:01

## 2024-10-29 RX ADMIN — Medication 400 MILLIGRAM(S): at 15:02

## 2024-10-29 RX ADMIN — Medication 30 MILLILITER(S): at 20:04

## 2024-10-29 RX ADMIN — Medication 400 MILLIGRAM(S): at 00:59

## 2024-10-29 RX ADMIN — Medication 30 MILLILITER(S): at 07:34

## 2024-10-29 RX ADMIN — Medication 100 MILLILITER(S): at 01:00

## 2024-10-29 RX ADMIN — HEPARIN SODIUM 5000 UNIT(S): 10000 INJECTION INTRAVENOUS; SUBCUTANEOUS at 15:01

## 2024-10-29 NOTE — PROGRESS NOTE ADULT - ATTENDING COMMENTS
As in above full notation.  I personally seen/ examined during daily rounds.  Vitals WNL, prevena holding suction and abdomen mildly distended, soft with appropriate incisional tenderness.  Labs reassuring. H/H stable. Start DVT PPX.   Continue NPO, NGT, [place], encourage OOB and ambulation.  Pain control PCA and ofirmev q6hrs.   Surgically, stable at present.

## 2024-10-29 NOTE — PROGRESS NOTE ADULT - ASSESSMENT
74M recovering s/p above procedure performed for closed loop small bowel obstruction due to internal hernia from previous billroth 2 surgery.    - NPO  - NGT to LIWS, Continue IV Fluid   - DVT ppx to begin  - ambulate with assistance - PT evaluation  - multimodal pain control   74M recovering s/p above procedure performed for closed loop small bowel obstruction due to internal hernia from previous billroth 2 surgery.    - NPO  - NGT to LIWS, Continue IV Fluid   - DVT ppx to begin  - ambulate with assistance - PT evaluation  - multimodal pain control with PCA

## 2024-10-29 NOTE — PROGRESS NOTE ADULT - SUBJECTIVE AND OBJECTIVE BOX
I have seen and examined the patient at bedside. No acute events postoperatively. Afebrile. NPO with NGT. No output on NGT. We flushed it in the morning and it has been working well.   The patient denies nausea and vomiting. Abdomen is soft. Prevena in place and working well.     Vital Signs Last 24 Hrs  T(C): 36.4 (29 Oct 2024 05:58), Max: 37 (28 Oct 2024 10:18)  T(F): 97.6 (29 Oct 2024 05:58), Max: 98.6 (28 Oct 2024 10:18)  HR: 64 (29 Oct 2024 05:58) (64 - 77)  BP: 121/70 (29 Oct 2024 05:58) (117/66 - 186/98)  BP(mean): 87 (29 Oct 2024 05:58) (82 - 97)  RR: 18 (29 Oct 2024 05:58) (12 - 18)  SpO2: 95% (29 Oct 2024 05:58) (92% - 99%)    Parameters below as of 29 Oct 2024 05:58  Patient On (Oxygen Delivery Method): room air    I&O's Detail    28 Oct 2024 07:01  -  29 Oct 2024 07:00  --------------------------------------------------------  IN:  Total IN: 0 mL    OUT:    Indwelling Catheter - Urethral (mL): 100 mL  Total OUT: 100 mL    Total NET: -100 mL    General: stable in no acute distress  HEENT: NGT in place to LIWS  Resp: normal effort on RA  Abd: soft, mildly tender about midline incision, Prevena in place to suction that is well kept, minimal output in canister  [place] in place with yellow urine  FROM all four extremities  Skin warm and well perfused    MEDICATIONS  (STANDING):  acetaminophen   IVPB .. 1000 milliGRAM(s) IV Intermittent every 6 hours  heparin   Injectable 5000 Unit(s) SubCutaneous every 8 hours  HYDROmorphone PCA (1 mG/mL) 30 milliLiter(s) PCA Continuous PCA Continuous  lactated ringers. 1000 milliLiter(s) (100 mL/Hr) IV Continuous <Continuous>  multivitamin 1 Tablet(s) Oral daily  pantoprazole    Tablet 40 milliGRAM(s) Oral before breakfast    MEDICATIONS  (PRN):  naloxone Injectable 0.1 milliGRAM(s) IV Push every 3 minutes PRN For ANY of the following changes in patient status:  A. RR LESS THAN 10 breaths per minute, B. Oxygen saturation LESS THAN 90%, C. Sedation score of 6  ondansetron Injectable 4 milliGRAM(s) IV Push every 6 hours PRN Nausea                          13.2   13.63 )-----------( 203      ( 29 Oct 2024 06:30 )             39.3     10-29    139  |  107  |  14  ----------------------------<  194[H]  4.2   |  24  |  1.15    Ca    7.8[L]      29 Oct 2024 06:30  Phos  3.6     10-29  Mg     1.9     10-29    TPro  7.7  /  Alb  3.9  /  TBili  0.6  /  DBili  x   /  AST  25  /  ALT  24  /  AlkPhos  108  10-28

## 2024-10-29 NOTE — PHYSICAL THERAPY INITIAL EVALUATION ADULT - NSPTDMEREC_GEN_A_CORE
New Patient Visit  Date of visit: 05/23/24   Diagnosis:   Stage:   Date of diagnosis:  PCP: Kathy Mondragon MD   Surgeon:   Radiation Oncologist:           History of Present Illness ***  Bree Flores is a 61 year old female whom I am seeing at the kind request of Kathy Mondragon MD for  Location  Timing  Duration  Severity  Quality  Context  Associated signs/symptoms  Modifying factors        Review of Systems  Review of Systems      Active Problems  Patient Active Problem List   Diagnosis    Osteoarthritis    GERD without esophagitis    Obesity (BMI 30-39.9)    Tobacco abuse disorder    Type 2 diabetes mellitus without complication, without long-term current use of insulin  (CMD)    Abnormal Pap smear of cervix    Essential hypertension    Squamous cell carcinoma in situ of skin    Other hyperlipidemia    Carpal tunnel syndrome on both sides    Swelling of joint of right shoulder    Lymph nodes enlarged    Hodgkin lymphoma  (CMD)    Right knee pain         Past Medical History  Past Medical History:   Diagnosis Date    Cataracts, bilateral     Chronic gastritis     DM2 (diabetes mellitus, type 2)  (CMD)     Dry eye syndrome     Fibroadenoma of breast 2014    confirmed by biopsy    History of prediabetes 2017    Hodgkin lymphoma  (CMD)     Malignant neoplasm  (CMD)     Pulmonary embolism  (CMD)     Pyelonephritis 2016       Past Surgical History  Past Surgical History:   Procedure Laterality Date    Breast biopsy  2014    fibroadenoma    Esophagogastroduodenoscopy transoral flex w/bx single or mult  06/12/2023    Tonsillectomy         Social History  Social History     Substance and Sexual Activity   Alcohol Use Not Currently    Comment: social     Alcohol  Social History     Tobacco Use   Smoking Status Former    Current packs/day: 1.00    Average packs/day: 1 pack/day for 33.0 years (33.0 ttl pk-yrs)    Types: Cigarettes   Smokeless Tobacco Never          Family History  Family History   Problem  Relation Age of Onset    Diabetes Mother 72    Dialysis/ESRD Mother     Myocardial Infarction Mother     Stroke/TIA Father 72    Aneurysm Father     Dialysis/ESRD Father     Cancer, Lung Father     Hypertension Father     Substance Abuse Brother     Thyroid Half-Sister     Alcohol Abuse Brother     Liver Disease Brother     Heart disease Brother          Review  Past medical history, problem list, family medical history, surgical history and social history reviewed.       Current Meds  Current Outpatient Medications   Medication Sig Dispense Refill    metFORMIN (GLUCOPHAGE) 500 MG tablet TAKE 1 TABLET BY MOUTH DAILY WITH MEALS FOR 7 DAYS THEN TAKE 1 TABLET BY MOUTH TWICE DAILY WITH MEALS THEREAFTER 60 tablet 3    hydrocortisone-lidocaine-pentoxifylline (P12) 10-4-3% cream Apply 1 Application topically.      atorvastatin (LIPITOR) 40 MG tablet Take 1 tablet by mouth daily. 90 tablet 1    lisinopril (ZESTRIL) 10 MG tablet TAKE 1 TABLET BY MOUTH DAILY 30 tablet 1    Eliquis 5 MG Tab       enoxaparin (LOVENOX) 100 MG/ML injectable solution       HYDROcodone-acetaminophen (NORCO) 5-325 MG per tablet Take 1 tablet by mouth every 4 hours as needed.      methylPREDNISolone (MEDROL DOSEPAK) 4 MG tablet FOLLOW PACKAGE DIRECTIONS      azithromycin (ZITHROMAX) 500 MG tablet TAKE 1 TABLET BY MOUTH ON DAY ONE      azithromycin (ZITHROMAX) 250 MG tablet Take 250 mg by mouth daily.      pantoprazole (PROTONIX) 40 MG tablet TAKE 1 TABLET BY MOUTH EVERY MORNING 1 HOUR BEFORE BREAKFAST      OneTouch Ultra test strip       dexAMETHasone (DECADRON) 4 MG tablet Take 2 tablets by mouth daily. Start the day after chemotherapy for 3 days. 12 tablet 0    albuterol (ProAir RespiClick) 108 (90 Base) MCG/ACT inhaler Inhale 2 puffs into the lungs every 4 hours as needed for Shortness of Breath or Wheezing. 1 each 1    DISPENSE Glucose Test Strips for testing 3 times per day 100 each 3    Blood Glucose Monitoring Suppl (ONE TOUCH ULTRA 2)  w/Device Kit Measure blood glucose 3 times daily. 1 kit 1    prochlorperazine (COMPAZINE) 10 MG tablet Take 1 tablet by mouth every 6 hours as needed for Nausea or Vomiting. 30 tablet 5    OLANZapine (ZyPREXA) 5 MG tablet Take 1 tablet by mouth nightly. Start the day of chemotherapy x 4 days for prevention of nausea/vomiting. 8 tablet 0    allopurinol (ZYLOPRIM) 300 MG tablet Take 1 tablet by mouth daily. Please start 2 days prior to chemo and then continue once daily 30 tablet 1    lidocaine-prilocaine (EMLA) cream Apply small amount to port 1 hr prior to access. Cover with saran wrap 30 g 0    Misc. Devices (Wrist Brace) Misc 1 Units daily. 1 each 1     No current facility-administered medications for this visit.       Current Allergies      ALLERGIES:   Allergen Reactions    Nsaids Other (See Comments) and NAUSEA     GI upset    Ibuprofen GI UPSET and Other (See Comments)    Naproxen GI UPSET           Vitals  Vitals:    24 0838 24 0846   BP:  118/72   Pulse:  90   Resp:  16   Temp:  98.3 °F (36.8 °C)   TempSrc:  Oral   SpO2:  97%   Weight:  95.2 kg (209 lb 14.1 oz)   Height: 5' 6\" (1.676 m) 5' 6\" (1.676 m)   PainSc:   0       Physical Exam  Physical Exam    Psychosocial assessment was obtained. Intervention {WAS/WAS NOT:30991} necessary.    ECOG Performance Status: {ONC ECO}    Pain Scale: {NUMBER /1-10, NONE, UNKNOWN:095813}    Treatment Related Toxicites: {PJCHEMTOX:932307}      Labs: ***      Imaging: ***       Assessment  Problem List Items Addressed This Visit    None     Cancer Staging Summary for Bree Flores       Hodgkin lymphoma  (CMD)       Stage Date Classification Stage Status    23 Clinical Stage I (Hodgkin lymphoma, A - Asymptomatic) Signed by Roberth Barksdale MD on 23                      Plan  No orders of the defined types were placed in this encounter.        Learning needs assessed. Learning intervention {WAS, WAS NOT:364543} required.  Above plan was discussed  with patient and family and all pertinent questions were answered. At the end of the evaluation, the patient was asked if all complaints had been addressed today to her satisfaction and she responded affirmatively.      Thank you MD Giancarlo Arrieta Shalini, MD for involving me in the care of this patient. I have sent you a copy of this visit. Please do not hesitate to call me with any questions/concerns.    Schedule follow-up: No follow-ups on file.    Greater than 50% of the visit was spent counseling regarding above issues; No LOS data to display  This includes pre-charting, chart review, and documenting.      Sincerely,       Yamileth Olivas MD       recommend/rolling walker

## 2024-10-29 NOTE — PATIENT PROFILE ADULT - FALL HARM RISK - HARM RISK INTERVENTIONS

## 2024-10-29 NOTE — PHYSICAL THERAPY INITIAL EVALUATION ADULT - PHYSICAL ASSIST/NONPHYSICAL ASSIST: GAIT, REHAB EVAL
1 person assist Enbrel Pregnancy And Lactation Text: This medication is Pregnancy Category B and is considered safe during pregnancy. It is unknown if this medication is excreted in breast milk.

## 2024-10-29 NOTE — PATIENT PROFILE ADULT - CONTRAINDICATIONS & PRECAUTIONS (SELECT ALL THAT APPLY)
Department of Hospital Medicine  Inpatient Progress Note    Patient: Lauren Mathur MRN: [id number]   : 1954 66 year old female   Date of admission: 2021 12:17 PM Today's Date: 2021       Subjective:   Pt was seen and examined. EMR reviewed. No overnight events. Pt reports feeling better. Feels comfortable on ht e hif lo. Does have some sore throat.       Medications and allergies reviewed on Epic.    Vital 24 Hour Range   Temperature Temp  Min: 96.4 °F (35.8 °C)  Max: 98.6 °F (37 °C)   Pulse Pulse  Min: 75  Max: 91   Respiratory Resp  Min: 16  Max: 24   Blood Pressure BP  Min: 132/78  Max: 157/81   Pulse Oximetry SpO2  Min: 90 %  Max: 97 %   Current O2     Body mass index is 33 kg/m².    Physical exam:      General: No distress, looks of stated age on HIflo   Skin: Warm and dry, No rash, No icterus.  HEENT: PERRLA, EOMI, supple neck, no JVD  Respiratory: Clear to auscultation bilaterally, breathing non labored   Cardio: S1, S2, regular rate and rhythm, no murmur   Abdomen: BS +, not tender, not distended; No masses   Muscular: ROM intact, no lower extremity edema  Neurologic: A & O X 3, no focal deficits    Psych: Mood appropriate      Medications: personally reviewed today in this patient's active orders section of epic    Allergies: personally reviewed today in epic    DATA:  Recent Labs   Lab 02/11/21  0621 02/10/21  0511 21  0518 21  0554 21  0606   WBC 21.1* 21.5* 17.8* 18.8* 25.3*   RBC 3.69* 4.04 3.74* 3.98* 4.00   HGB 10.7* 11.6* 10.9* 11.6* 11.6*   HCT 33.4* 36.8 34.0* 36.2 36.1    323 309 329 376   SEG 93 93 93 91 93       Recent Labs   Lab 02/11/21  0621 02/10/21  0511 21  0518 21  0554 21  0606   SODIUM 141 140 141 140 141   POTASSIUM 3.9 4.1 4.3 4.1 4.1   CHLORIDE 113* 112* 111* 106 105   CO2 23 24 27 29 31   BUN 37* 40* 44* 42* 41*   CREATININE 0.73 0.76 0.80 0.84 0.95   GLUCOSE 209* 176* 184* 162* 168*   CALCIUM 7.8* 8.2* 8.7 8.6 8.6       No  results found    No results found    MICROBIO:   No results found for this or any previous visit.      Results for orders placed or performed in visit on 08/23/18   URINE CULTURE INCLUDE SENS   Result Value Ref Range    Specimen Description URINE, CLEAN CATCH/MIDSTREAM     CULTURE NO GROWTH.     REPORT STATUS 08/25/2018 FINAL        IMAGING:  Reviewed.         ASSESSMENT AND PLAN:  Lauren Mathur is a 66 year old female admitted on 2/4/2021 who has a darian HTN, COPD, RLS who was admitted for the management of ac. Hypoxic RF due to COVID 19 PNA. She is currently being managed as follows:     Ac. Hypoxemic respiratory failure due to COVID 19 PNA  Ac. Exacerbation COPD due to above   - solumedrol as per pulmonology   - BIPAP prn   - D/c abx, completed 7 days   - Pt/Ot/RT , inhalers         Anxiety/Depression Zoloft     HTN: amlodipine , lisinopril     Leucocytosis: related to steroids likely. Follow up.       Hyperglycemia: steroid related. Correctional insulin.     DVT Prophylaxis:  Lovenox     Code status: Full Resuscitation    Disposition: TBD           Becky Diaz MD  Hospitalist, Department of Internal Medicine  2/11/2021  4:19 PM     none...

## 2024-10-29 NOTE — PHYSICAL THERAPY INITIAL EVALUATION ADULT - GENERAL OBSERVATIONS, REHAB EVAL
RN held ngt suction, pca pump, drain, spo2 monitor, ryan on the sides, ambulatory rw oob chair male patient, s/p ex lap, pca pump, drain, spo2 monitoring, RN held ngt suction, pt tolerating gait and transfers assessment with rw and PT assist at bedside, oob chair, ryan on the sides,

## 2024-10-29 NOTE — PHYSICAL THERAPY INITIAL EVALUATION ADULT - STANDING BALANCE: DYNAMIC, REHAB EVAL
Problem: Respiratory Impairment - Respiratory Therapy 253  Intervention: Inhaled medication delivery  Note: Intervention Status  Done  Intervention: Inhaled gas administration  Note: Intervention Status  Done  Intervention: Respiratory support devices  Note: Intervention Status  Done      rw/good balance

## 2024-10-30 ENCOUNTER — TRANSCRIPTION ENCOUNTER (OUTPATIENT)
Age: 74
End: 2024-10-30

## 2024-10-30 LAB
ANION GAP SERPL CALC-SCNC: 3 MMOL/L — LOW (ref 5–17)
BUN SERPL-MCNC: 15 MG/DL — SIGNIFICANT CHANGE UP (ref 7–18)
CALCIUM SERPL-MCNC: 8.3 MG/DL — LOW (ref 8.4–10.5)
CHLORIDE SERPL-SCNC: 107 MMOL/L — SIGNIFICANT CHANGE UP (ref 96–108)
CO2 SERPL-SCNC: 29 MMOL/L — SIGNIFICANT CHANGE UP (ref 22–31)
CREAT SERPL-MCNC: 0.99 MG/DL — SIGNIFICANT CHANGE UP (ref 0.5–1.3)
EGFR: 80 ML/MIN/1.73M2 — SIGNIFICANT CHANGE UP
GLUCOSE SERPL-MCNC: 139 MG/DL — HIGH (ref 70–99)
HCT VFR BLD CALC: 37 % — LOW (ref 39–50)
HGB BLD-MCNC: 12.3 G/DL — LOW (ref 13–17)
MAGNESIUM SERPL-MCNC: 2.4 MG/DL — SIGNIFICANT CHANGE UP (ref 1.6–2.6)
MCHC RBC-ENTMCNC: 28.5 PG — SIGNIFICANT CHANGE UP (ref 27–34)
MCHC RBC-ENTMCNC: 33.2 G/DL — SIGNIFICANT CHANGE UP (ref 32–36)
MCV RBC AUTO: 85.6 FL — SIGNIFICANT CHANGE UP (ref 80–100)
NRBC # BLD: 0 /100 WBCS — SIGNIFICANT CHANGE UP (ref 0–0)
PHOSPHATE SERPL-MCNC: 2.3 MG/DL — LOW (ref 2.5–4.5)
PLATELET # BLD AUTO: 190 K/UL — SIGNIFICANT CHANGE UP (ref 150–400)
POTASSIUM SERPL-MCNC: 4.3 MMOL/L — SIGNIFICANT CHANGE UP (ref 3.5–5.3)
POTASSIUM SERPL-SCNC: 4.3 MMOL/L — SIGNIFICANT CHANGE UP (ref 3.5–5.3)
RBC # BLD: 4.32 M/UL — SIGNIFICANT CHANGE UP (ref 4.2–5.8)
RBC # FLD: 14.8 % — HIGH (ref 10.3–14.5)
SODIUM SERPL-SCNC: 139 MMOL/L — SIGNIFICANT CHANGE UP (ref 135–145)
WBC # BLD: 10.25 K/UL — SIGNIFICANT CHANGE UP (ref 3.8–10.5)
WBC # FLD AUTO: 10.25 K/UL — SIGNIFICANT CHANGE UP (ref 3.8–10.5)

## 2024-10-30 RX ORDER — PANTOPRAZOLE SODIUM 40 MG/1
40 TABLET, DELAYED RELEASE ORAL DAILY
Refills: 0 | Status: DISCONTINUED | OUTPATIENT
Start: 2024-10-30 | End: 2024-11-03

## 2024-10-30 RX ORDER — OXYCODONE HYDROCHLORIDE 30 MG/1
5 TABLET ORAL EVERY 4 HOURS
Refills: 0 | Status: DISCONTINUED | OUTPATIENT
Start: 2024-10-30 | End: 2024-11-02

## 2024-10-30 RX ORDER — MELATONIN 5 MG
3 TABLET ORAL AT BEDTIME
Refills: 0 | Status: DISCONTINUED | OUTPATIENT
Start: 2024-10-30 | End: 2024-11-03

## 2024-10-30 RX ORDER — ACETAMINOPHEN 500 MG
1000 TABLET ORAL EVERY 6 HOURS
Refills: 0 | Status: COMPLETED | OUTPATIENT
Start: 2024-10-30 | End: 2024-10-31

## 2024-10-30 RX ORDER — KETOROLAC TROMETHAMINE 30 MG/ML
15 INJECTION INTRAMUSCULAR; INTRAVENOUS EVERY 6 HOURS
Refills: 0 | Status: DISCONTINUED | OUTPATIENT
Start: 2024-10-30 | End: 2024-11-02

## 2024-10-30 RX ORDER — HYDROMORPHONE HCL/0.9% NACL/PF 6 MG/30 ML
0.5 PATIENT CONTROLLED ANALGESIA SYRINGE INTRAVENOUS EVERY 4 HOURS
Refills: 0 | Status: DISCONTINUED | OUTPATIENT
Start: 2024-10-30 | End: 2024-11-02

## 2024-10-30 RX ORDER — DIPHENHYDRAMINE HCL 12.5MG/5ML
50 ELIXIR ORAL ONCE
Refills: 0 | Status: COMPLETED | OUTPATIENT
Start: 2024-10-30 | End: 2024-10-30

## 2024-10-30 RX ADMIN — HEPARIN SODIUM 5000 UNIT(S): 10000 INJECTION INTRAVENOUS; SUBCUTANEOUS at 16:15

## 2024-10-30 RX ADMIN — PANTOPRAZOLE SODIUM 40 MILLIGRAM(S): 40 TABLET, DELAYED RELEASE ORAL at 06:31

## 2024-10-30 RX ADMIN — KETOROLAC TROMETHAMINE 15 MILLIGRAM(S): 30 INJECTION INTRAMUSCULAR; INTRAVENOUS at 12:11

## 2024-10-30 RX ADMIN — Medication 30 MILLILITER(S): at 07:36

## 2024-10-30 RX ADMIN — KETOROLAC TROMETHAMINE 15 MILLIGRAM(S): 30 INJECTION INTRAMUSCULAR; INTRAVENOUS at 01:00

## 2024-10-30 RX ADMIN — Medication 3 MILLIGRAM(S): at 21:51

## 2024-10-30 RX ADMIN — KETOROLAC TROMETHAMINE 15 MILLIGRAM(S): 30 INJECTION INTRAMUSCULAR; INTRAVENOUS at 12:26

## 2024-10-30 RX ADMIN — HEPARIN SODIUM 5000 UNIT(S): 10000 INJECTION INTRAVENOUS; SUBCUTANEOUS at 06:31

## 2024-10-30 RX ADMIN — Medication 1 TABLET(S): at 12:16

## 2024-10-30 RX ADMIN — HEPARIN SODIUM 5000 UNIT(S): 10000 INJECTION INTRAVENOUS; SUBCUTANEOUS at 21:51

## 2024-10-30 RX ADMIN — KETOROLAC TROMETHAMINE 15 MILLIGRAM(S): 30 INJECTION INTRAMUSCULAR; INTRAVENOUS at 18:14

## 2024-10-30 NOTE — PROGRESS NOTE ADULT - SUBJECTIVE AND OBJECTIVE BOX
Pt seen and examined at bedside. No acute events overnight. Afebrile and hemodynamically stable, non-tachycardic. Pt remains NPO with NGT to LIWS, 350cc bilious contents in the canister and tube. Flushed and patent. No ambulating yet due to inconvenience, will ambulate today. PT eval for home with home PT. Passing gas but no bm's, voiding.    Vital Signs Last 24 Hrs  T(C): 36.8 (30 Oct 2024 05:24), Max: 36.9 (29 Oct 2024 22:12)  T(F): 98.2 (30 Oct 2024 05:24), Max: 98.4 (29 Oct 2024 22:12)  HR: 66 (30 Oct 2024 05:24) (63 - 83)  BP: 152/76 (30 Oct 2024 05:24) (103/56 - 152/76)  BP(mean): 88 (29 Oct 2024 22:12) (88 - 88)  RR: 17 (30 Oct 2024 05:24) (16 - 18)  SpO2: 93% (30 Oct 2024 05:24) (93% - 95%)  I&O's Detail  Parameters below as of 30 Oct 2024 05:24  Patient On (Oxygen Delivery Method): room air    General: stable in no acute distress  HEENT: NGT in place to LIWS, bilious fluid in the canister  Resp: normal effort on RA  Abd: soft, mildly tender about midline incision, Prevena in place to suction that is well kept, minimal output in canister  [place] in place with yellow urine  FROM all four extremities  Skin warm and well perfused    Pending am labs Pt seen and examined at bedside. No acute events overnight. Afebrile and hemodynamically stable, non-tachycardic. Pt remains NPO with NGT to LIWS, 350cc bilious contents in the canister and tube. Flushed and patent. No ambulating yet due to inconvenience, will ambulate today. PT eval for home with home PT. Passing gas but no bm's, voiding.    Vital Signs Last 24 Hrs  T(C): 36.8 (30 Oct 2024 05:24), Max: 36.9 (29 Oct 2024 22:12)  T(F): 98.2 (30 Oct 2024 05:24), Max: 98.4 (29 Oct 2024 22:12)  HR: 66 (30 Oct 2024 05:24) (63 - 83)  BP: 152/76 (30 Oct 2024 05:24) (103/56 - 152/76)  BP(mean): 88 (29 Oct 2024 22:12) (88 - 88)  RR: 17 (30 Oct 2024 05:24) (16 - 18)  SpO2: 93% (30 Oct 2024 05:24) (93% - 95%)  I&O's Detail  Parameters below as of 30 Oct 2024 05:24  Patient On (Oxygen Delivery Method): room air    General: stable in no acute distress  HEENT: NGT in place to LIWS, bilious fluid in the canister  Resp: normal effort on RA  Abd: soft, mildly tender about midline incision, Prevena in place to suction that is well kept, minimal output in canister  [place] in place with yellow urine  FROM all four extremities  Skin warm and well perfused                          12.3   10.25 )-----------( 190      ( 30 Oct 2024 08:30 )             37.0     10-30    139  |  107  |  15  ----------------------------<  139[H]  4.3   |  29  |  0.99    Ca    8.3[L]      30 Oct 2024 08:30  Phos  2.3     10-30  Mg     2.4     10-30    TPro  7.7  /  Alb  3.9  /  TBili  0.6  /  DBili  x   /  AST  25  /  ALT  24  /  AlkPhos  108  10-28

## 2024-10-30 NOTE — PROGRESS NOTE ADULT - ASSESSMENT
74M recovering s/p above procedure performed for closed loop small bowel obstruction due to internal hernia from previous billroth 2 surgery, passing gas, recovering well so far    - NPO  - NGT to LIWS, Continue IV Fluid   - DVT ppx to begin  - ambulate with assistance - PT evaluation  - multimodal pain control, will dc PCA pump today for oral regimen   74M recovering s/p above procedure performed for closed loop small bowel obstruction due to internal hernia from previous billroth 2 surgery, passing gas, recovering well so far    - NPO  - NGT to LIWS, Continue IV Fluid   - DVT ppx to begin  - ambulate with assistance - PT evaluation  - multimodal pain control, will dc PCA pump today for oral regimen

## 2024-10-30 NOTE — PROGRESS NOTE ADULT - ATTENDING COMMENTS
As in above full notation.  I personally seen/ examined during daily rounds.  Vitals WNL, prevena holding suction and abdomen mildly distended, soft with appropriate incisional tenderness.  Labs reassuring. H/H stable. Start DVT PPX.   Continue NPO, NGT, [place], encourage OOB and ambulation.  Pain control PCA and ofirmev q6hrs.   Surgically, stable at present. Patient seen and evaluated at bedside found hemodynamically stable and in no acute distress. NGT in place with bilious output. Patient report passing gas x4. Abdomen is distended, non tender to palpation. Prevena holding suction. Labs WNL.     Plan NGT Clamp trial, connect to suction at 4pm and record output. Continue NGT overnight, plan for GGC 10/31. OOB and ambulation encouraged with PT.

## 2024-10-30 NOTE — DISCHARGE NOTE NURSING/CASE MANAGEMENT/SOCIAL WORK - FINANCIAL ASSISTANCE
[facility] provides services at a reduced cost to those who are determined to be eligible through [facility]’s financial assistance program. Information regarding [facility]’s financial assistance program can be found by going to [email or web address] or by calling [phone].

## 2024-10-31 LAB
ANION GAP SERPL CALC-SCNC: 5 MMOL/L — SIGNIFICANT CHANGE UP (ref 5–17)
BUN SERPL-MCNC: 18 MG/DL — SIGNIFICANT CHANGE UP (ref 7–18)
CALCIUM SERPL-MCNC: 8.1 MG/DL — LOW (ref 8.4–10.5)
CHLORIDE SERPL-SCNC: 106 MMOL/L — SIGNIFICANT CHANGE UP (ref 96–108)
CO2 SERPL-SCNC: 27 MMOL/L — SIGNIFICANT CHANGE UP (ref 22–31)
CREAT SERPL-MCNC: 0.84 MG/DL — SIGNIFICANT CHANGE UP (ref 0.5–1.3)
EGFR: 92 ML/MIN/1.73M2 — SIGNIFICANT CHANGE UP
GLUCOSE SERPL-MCNC: 100 MG/DL — HIGH (ref 70–99)
HCT VFR BLD CALC: 34.6 % — LOW (ref 39–50)
HGB BLD-MCNC: 11.6 G/DL — LOW (ref 13–17)
MAGNESIUM SERPL-MCNC: 2.5 MG/DL — SIGNIFICANT CHANGE UP (ref 1.6–2.6)
MCHC RBC-ENTMCNC: 28.4 PG — SIGNIFICANT CHANGE UP (ref 27–34)
MCHC RBC-ENTMCNC: 33.5 G/DL — SIGNIFICANT CHANGE UP (ref 32–36)
MCV RBC AUTO: 84.8 FL — SIGNIFICANT CHANGE UP (ref 80–100)
NRBC # BLD: 0 /100 WBCS — SIGNIFICANT CHANGE UP (ref 0–0)
PHOSPHATE SERPL-MCNC: 1.8 MG/DL — LOW (ref 2.5–4.5)
PLATELET # BLD AUTO: 209 K/UL — SIGNIFICANT CHANGE UP (ref 150–400)
POTASSIUM SERPL-MCNC: 4 MMOL/L — SIGNIFICANT CHANGE UP (ref 3.5–5.3)
POTASSIUM SERPL-SCNC: 4 MMOL/L — SIGNIFICANT CHANGE UP (ref 3.5–5.3)
RBC # BLD: 4.08 M/UL — LOW (ref 4.2–5.8)
RBC # FLD: 14.6 % — HIGH (ref 10.3–14.5)
SODIUM SERPL-SCNC: 138 MMOL/L — SIGNIFICANT CHANGE UP (ref 135–145)
WBC # BLD: 8.58 K/UL — SIGNIFICANT CHANGE UP (ref 3.8–10.5)
WBC # FLD AUTO: 8.58 K/UL — SIGNIFICANT CHANGE UP (ref 3.8–10.5)

## 2024-10-31 PROCEDURE — 74018 RADEX ABDOMEN 1 VIEW: CPT | Mod: 26,76

## 2024-10-31 RX ORDER — DIATRIZOATE MEGLUMINE 180 MG/ML
1000 INJECTION, SOLUTION INTRAVESICAL ONCE
Refills: 0 | Status: DISCONTINUED | OUTPATIENT
Start: 2024-10-31 | End: 2024-10-31

## 2024-10-31 RX ORDER — DIATRIZOATE MEGLUMINE 180 MG/ML
90 INJECTION, SOLUTION INTRAVESICAL ONCE
Refills: 0 | Status: COMPLETED | OUTPATIENT
Start: 2024-10-31 | End: 2024-10-31

## 2024-10-31 RX ADMIN — PANTOPRAZOLE SODIUM 40 MILLIGRAM(S): 40 TABLET, DELAYED RELEASE ORAL at 13:28

## 2024-10-31 RX ADMIN — KETOROLAC TROMETHAMINE 15 MILLIGRAM(S): 30 INJECTION INTRAMUSCULAR; INTRAVENOUS at 13:28

## 2024-10-31 RX ADMIN — KETOROLAC TROMETHAMINE 15 MILLIGRAM(S): 30 INJECTION INTRAMUSCULAR; INTRAVENOUS at 00:27

## 2024-10-31 RX ADMIN — KETOROLAC TROMETHAMINE 15 MILLIGRAM(S): 30 INJECTION INTRAMUSCULAR; INTRAVENOUS at 07:02

## 2024-10-31 RX ADMIN — HEPARIN SODIUM 5000 UNIT(S): 10000 INJECTION INTRAVENOUS; SUBCUTANEOUS at 05:49

## 2024-10-31 RX ADMIN — DIATRIZOATE MEGLUMINE 90 MILLILITER(S): 180 INJECTION, SOLUTION INTRAVESICAL at 13:28

## 2024-10-31 RX ADMIN — HEPARIN SODIUM 5000 UNIT(S): 10000 INJECTION INTRAVENOUS; SUBCUTANEOUS at 21:51

## 2024-10-31 RX ADMIN — KETOROLAC TROMETHAMINE 15 MILLIGRAM(S): 30 INJECTION INTRAMUSCULAR; INTRAVENOUS at 05:49

## 2024-10-31 NOTE — DIETITIAN INITIAL EVALUATION ADULT - PERTINENT LABORATORY DATA
10-31    138  |  106  |  18  ----------------------------<  100[H]  4.0   |  27  |  0.84    Ca    8.1[L]      31 Oct 2024 07:00  Phos  1.8     10-31  Mg     2.5     10-31

## 2024-10-31 NOTE — DIETITIAN INITIAL EVALUATION ADULT - ADD RECOMMEND
1) Advance current diet as medically feasible.  2) Encourage PO intake and honor food preferences as able.  3) Monitor PO intake, labs, weights, BM's, and skin integrity.

## 2024-10-31 NOTE — DIETITIAN INITIAL EVALUATION ADULT - NSFNSGIIOFT_GEN_A_CORE
10-30-24 @ 07:01  -  10-31-24 @ 07:00  --------------------------------------------------------  OUT:    Nasogastric/Oral tube (mL): 600 mL  Total OUT: 600 mL    Total NET: -600 mL

## 2024-10-31 NOTE — DIETITIAN INITIAL EVALUATION ADULT - PERTINENT MEDS FT
MEDICATIONS  (STANDING):  diatrizoate meglumine/diatrizoate sodium. 1000 milliLiter(s) Oral once  heparin   Injectable 5000 Unit(s) SubCutaneous every 8 hours  influenza  Vaccine (HIGH DOSE) 0.5 milliLiter(s) IntraMuscular once  ketorolac   Injectable 15 milliGRAM(s) IV Push every 6 hours  lactated ringers. 1000 milliLiter(s) (100 mL/Hr) IV Continuous <Continuous>  melatonin 3 milliGRAM(s) Oral at bedtime  multivitamin 1 Tablet(s) Oral daily  pantoprazole  Injectable 40 milliGRAM(s) IV Push daily    MEDICATIONS  (PRN):  HYDROmorphone  Injectable 0.5 milliGRAM(s) IV Push every 4 hours PRN Severe Pain (7 - 10)  naloxone Injectable 0.1 milliGRAM(s) IV Push every 3 minutes PRN For ANY of the following changes in patient status:  A. RR LESS THAN 10 breaths per minute, B. Oxygen saturation LESS THAN 90%, C. Sedation score of 6  ondansetron Injectable 4 milliGRAM(s) IV Push every 6 hours PRN Nausea  oxyCODONE    IR 5 milliGRAM(s) Oral every 4 hours PRN Moderate Pain (4 - 6)

## 2024-10-31 NOTE — PROGRESS NOTE ADULT - SUBJECTIVE AND OBJECTIVE BOX
Pt seen and examined at bedside. No acute events overnight. Afebrile and hemodynamically stable, non-tachycardic. Clamp trial was performed yesterday but patient became distended and tender so put back to suction. Pt remains NPO with NGT to LIWS, 600cc bilious contents in past 24 hours. Flushed and patent. Ambulated yesterday through the halls. PT eval for home with home PT. Passing gas but no bm's, voiding.    Vital Signs Last 24 Hrs  T(C): 37.2 (31 Oct 2024 05:04), Max: 37.2 (30 Oct 2024 20:58)  T(F): 99 (31 Oct 2024 05:04), Max: 99 (30 Oct 2024 20:58)  HR: 70 (31 Oct 2024 05:04) (67 - 75)  BP: 163/76 (31 Oct 2024 05:04) (153/75 - 164/84)  BP(mean): 105 (31 Oct 2024 05:04) (105 - 110)  RR: 18 (31 Oct 2024 05:04) (18 - 18)  SpO2: 95% (31 Oct 2024 05:04) (94% - 95%)  Parameters below as of 31 Oct 2024 05:04  Patient On (Oxygen Delivery Method): room air    General: stable in no acute distress  HEENT: NGT in place to LIWS, bilious fluid in the canister, flushed and patent  Resp: normal effort on RA  Abd: soft, mildly tender about midline incision, Prevena in place to suction that is well kept, minimal output in canister  FROM all four extremities  Skin warm and well perfused                                   11.6   8.58  )-----------( 209      ( 31 Oct 2024 07:00 )             34.6     10-30    139  |  107  |  15  ----------------------------<  139[H]  4.3   |  29  |  0.99    Ca    8.3[L]      30 Oct 2024 08:30  Phos  2.3     10-30  Mg     2.4     10-30     10-31    138  |  106  |  18  ----------------------------<  100[H]  4.0   |  27  |  0.84    Ca    8.1[L]      31 Oct 2024 07:00  Phos  1.8     10-31  Mg     2.5     10-31    Pt seen and examined at bedside. No acute events overnight. Afebrile and hemodynamically stable, non-tachycardic. Clamp trial was performed yesterday but patient became distended and tender so put back to suction. Pt remains NPO with NGT to LIWS, 600cc bilious contents in past 24 hours. Flushed and patent. Ambulated yesterday through the halls. PT eval for home with home PT. Passing gas but no bm's, voiding.    Vital Signs Last 24 Hrs  T(C): 37.2 (31 Oct 2024 05:04), Max: 37.2 (30 Oct 2024 20:58)  T(F): 99 (31 Oct 2024 05:04), Max: 99 (30 Oct 2024 20:58)  HR: 70 (31 Oct 2024 05:04) (67 - 75)  BP: 163/76 (31 Oct 2024 05:04) (153/75 - 164/84)  BP(mean): 105 (31 Oct 2024 05:04) (105 - 110)  RR: 18 (31 Oct 2024 05:04) (18 - 18)  SpO2: 95% (31 Oct 2024 05:04) (94% - 95%)  Parameters below as of 31 Oct 2024 05:04  Patient On (Oxygen Delivery Method): room air    General: stable in no acute distress  HEENT: NGT in place to LIWS, bilious fluid in the canister, flushed and patent  Resp: normal effort on RA  Abd: soft, mildly tender about midline incision, Prevena in place to suction that is well kept, minimal output in canister  FROM all four extremities  Skin warm and well perfused                                   11.6   8.58  )-----------( 209      ( 31 Oct 2024 07:00 )             34.6

## 2024-10-31 NOTE — PROGRESS NOTE ADULT - ATTENDING COMMENTS
Patient seen and evaluated at bedside found hemodynamically stable and in no acute distress. NGT in place with bilious output. Patient report passing gas x4. Abdomen is distended, non tender to palpation. Prevena holding suction. Labs WNL.     Plan NGT Clamp trial, connect to suction at 4pm and record output. Continue NGT overnight, plan for GGC 10/31. OOB and ambulation encouraged with PT. As in above full notation.  I personally seen/ examined during daily rounds.  Vitals non-suggestive.  Wounds clean and abdomen soft with appropriate incisional tenderness.  Labs reassuring.  Clinically, improving overall.  Surgically, stable at present.  AXR with non-specific gas pattern, plan for gastrograffin challenge today.

## 2024-10-31 NOTE — PROGRESS NOTE ADULT - ASSESSMENT
74M recovering s/p above procedure performed for closed loop small bowel obstruction due to internal hernia from previous billroth 2 surgery, passing gas, recovering well so far    - NPO  - NGT to LIWS, Continue IV Fluid   - Abd XR today, likely gastrograffin challenge after that  - DVT ppx  - ambulate with assistance - PT evaluation  - multimodal pain control

## 2024-10-31 NOTE — DIETITIAN INITIAL EVALUATION ADULT - ORAL INTAKE PTA/DIET HISTORY
Spoke to pt at bedside, pt reported no new food allergies or intolerances. Pt does not take any vitamins or supplements at home. Pt's intake was good PTA. Pt experienced N/V/abd pain prior to admission. Reported UBW:150 lbs, no recent significant weight changes. HIE: 150 lbs - 7/31/23, 145 lbs -  5/5/22  Nutrition interview: No recent episodes of nausea, vomiting, diarrhea or constipation per pt. Last BM noted on 10/29 per pt. Denies any chewing/swallowing difficulties. Pt is NPO at time of visit. Food preferences explored and forwarded to dietary. Noted with hypocalcemia (8.3), hypophosphatemia (2.3).

## 2024-11-01 ENCOUNTER — TRANSCRIPTION ENCOUNTER (OUTPATIENT)
Age: 74
End: 2024-11-01

## 2024-11-01 LAB
ANION GAP SERPL CALC-SCNC: 7 MMOL/L — SIGNIFICANT CHANGE UP (ref 5–17)
BUN SERPL-MCNC: 25 MG/DL — HIGH (ref 7–18)
CALCIUM SERPL-MCNC: 8.4 MG/DL — SIGNIFICANT CHANGE UP (ref 8.4–10.5)
CHLORIDE SERPL-SCNC: 109 MMOL/L — HIGH (ref 96–108)
CO2 SERPL-SCNC: 25 MMOL/L — SIGNIFICANT CHANGE UP (ref 22–31)
CREAT SERPL-MCNC: 0.98 MG/DL — SIGNIFICANT CHANGE UP (ref 0.5–1.3)
EGFR: 81 ML/MIN/1.73M2 — SIGNIFICANT CHANGE UP
GLUCOSE SERPL-MCNC: 83 MG/DL — SIGNIFICANT CHANGE UP (ref 70–99)
HCT VFR BLD CALC: 37.6 % — LOW (ref 39–50)
HGB BLD-MCNC: 12.5 G/DL — LOW (ref 13–17)
MAGNESIUM SERPL-MCNC: 2.6 MG/DL — SIGNIFICANT CHANGE UP (ref 1.6–2.6)
MCHC RBC-ENTMCNC: 28.3 PG — SIGNIFICANT CHANGE UP (ref 27–34)
MCHC RBC-ENTMCNC: 33.2 G/DL — SIGNIFICANT CHANGE UP (ref 32–36)
MCV RBC AUTO: 85.3 FL — SIGNIFICANT CHANGE UP (ref 80–100)
NRBC # BLD: 0 /100 WBCS — SIGNIFICANT CHANGE UP (ref 0–0)
PHOSPHATE SERPL-MCNC: 2.4 MG/DL — LOW (ref 2.5–4.5)
PLATELET # BLD AUTO: 249 K/UL — SIGNIFICANT CHANGE UP (ref 150–400)
POTASSIUM SERPL-MCNC: 4.6 MMOL/L — SIGNIFICANT CHANGE UP (ref 3.5–5.3)
POTASSIUM SERPL-SCNC: 4.6 MMOL/L — SIGNIFICANT CHANGE UP (ref 3.5–5.3)
RBC # BLD: 4.41 M/UL — SIGNIFICANT CHANGE UP (ref 4.2–5.8)
RBC # FLD: 14.6 % — HIGH (ref 10.3–14.5)
SODIUM SERPL-SCNC: 141 MMOL/L — SIGNIFICANT CHANGE UP (ref 135–145)
WBC # BLD: 7.16 K/UL — SIGNIFICANT CHANGE UP (ref 3.8–10.5)
WBC # FLD AUTO: 7.16 K/UL — SIGNIFICANT CHANGE UP (ref 3.8–10.5)

## 2024-11-01 RX ORDER — AMLODIPINE BESYLATE 10 MG
5 TABLET ORAL ONCE
Refills: 0 | Status: COMPLETED | OUTPATIENT
Start: 2024-11-01 | End: 2024-11-01

## 2024-11-01 RX ORDER — OXYCODONE HYDROCHLORIDE 30 MG/1
1 TABLET ORAL
Qty: 12 | Refills: 0
Start: 2024-11-01 | End: 2024-11-03

## 2024-11-01 RX ORDER — DIBASIC SODIUM PHOSPHATE, MONOBASIC POTASSIUM PHOSPHATE AND MONOBASIC SODIUM PHOSPHATE 852; 155; 130 MG/1; MG/1; MG/1
1 TABLET ORAL
Refills: 0 | Status: COMPLETED | OUTPATIENT
Start: 2024-11-01 | End: 2024-11-02

## 2024-11-01 RX ADMIN — KETOROLAC TROMETHAMINE 15 MILLIGRAM(S): 30 INJECTION INTRAMUSCULAR; INTRAVENOUS at 15:02

## 2024-11-01 RX ADMIN — HEPARIN SODIUM 5000 UNIT(S): 10000 INJECTION INTRAVENOUS; SUBCUTANEOUS at 05:09

## 2024-11-01 RX ADMIN — KETOROLAC TROMETHAMINE 15 MILLIGRAM(S): 30 INJECTION INTRAMUSCULAR; INTRAVENOUS at 05:09

## 2024-11-01 RX ADMIN — Medication 1 TABLET(S): at 11:46

## 2024-11-01 RX ADMIN — KETOROLAC TROMETHAMINE 15 MILLIGRAM(S): 30 INJECTION INTRAMUSCULAR; INTRAVENOUS at 11:45

## 2024-11-01 RX ADMIN — KETOROLAC TROMETHAMINE 15 MILLIGRAM(S): 30 INJECTION INTRAMUSCULAR; INTRAVENOUS at 01:25

## 2024-11-01 RX ADMIN — Medication 3 MILLIGRAM(S): at 21:57

## 2024-11-01 RX ADMIN — DIBASIC SODIUM PHOSPHATE, MONOBASIC POTASSIUM PHOSPHATE AND MONOBASIC SODIUM PHOSPHATE 1 PACKET(S): 852; 155; 130 TABLET ORAL at 11:45

## 2024-11-01 RX ADMIN — HEPARIN SODIUM 5000 UNIT(S): 10000 INJECTION INTRAVENOUS; SUBCUTANEOUS at 14:30

## 2024-11-01 RX ADMIN — KETOROLAC TROMETHAMINE 15 MILLIGRAM(S): 30 INJECTION INTRAMUSCULAR; INTRAVENOUS at 17:29

## 2024-11-01 RX ADMIN — KETOROLAC TROMETHAMINE 15 MILLIGRAM(S): 30 INJECTION INTRAMUSCULAR; INTRAVENOUS at 00:25

## 2024-11-01 RX ADMIN — Medication 5 MILLIGRAM(S): at 21:57

## 2024-11-01 RX ADMIN — HEPARIN SODIUM 5000 UNIT(S): 10000 INJECTION INTRAVENOUS; SUBCUTANEOUS at 21:57

## 2024-11-01 RX ADMIN — Medication 50 MILLILITER(S): at 21:58

## 2024-11-01 RX ADMIN — PANTOPRAZOLE SODIUM 40 MILLIGRAM(S): 40 TABLET, DELAYED RELEASE ORAL at 11:46

## 2024-11-01 RX ADMIN — Medication 100 MILLILITER(S): at 00:25

## 2024-11-01 RX ADMIN — KETOROLAC TROMETHAMINE 15 MILLIGRAM(S): 30 INJECTION INTRAMUSCULAR; INTRAVENOUS at 18:16

## 2024-11-01 RX ADMIN — DIBASIC SODIUM PHOSPHATE, MONOBASIC POTASSIUM PHOSPHATE AND MONOBASIC SODIUM PHOSPHATE 1 PACKET(S): 852; 155; 130 TABLET ORAL at 16:43

## 2024-11-01 NOTE — PROGRESS NOTE ADULT - SUBJECTIVE AND OBJECTIVE BOX
Pt seen and examined at bedside. No acute events overnight. Afebrile and hemodynamically stable, non-tachycardic. Pt underwent gastrograffin challenge yesterday which was productive of stool, pt remains passing flatus, abd XR post-gastrograffin demonstrated contrast in the colon. Ambulated yesterday through the halls. PT eval for home with home PT. NG output minimal.    Vital Signs Last 24 Hrs  T(C): 36.8 (01 Nov 2024 05:51), Max: 36.9 (31 Oct 2024 11:30)  T(F): 98.2 (01 Nov 2024 05:51), Max: 98.4 (31 Oct 2024 11:30)  HR: 61 (01 Nov 2024 05:51) (61 - 70)  BP: 170/74 (01 Nov 2024 05:51) (151/77 - 170/74)  BP(mean): 106 (01 Nov 2024 05:51) (102 - 106)  RR: 18 (01 Nov 2024 05:51) (18 - 18)  SpO2: 96% (01 Nov 2024 05:51) (95% - 97%)  Parameters below as of 01 Nov 2024 05:51  Patient On (Oxygen Delivery Method): room air    General: stable in no acute distress  HEENT: NGT in place to LIWS, bilious fluid in the canister, flushed and patent  Resp: normal effort on RA  Abd: soft, mildly tender about midline incision, Prevena in place to suction that is well kept, minimal output in canister  FROM all four extremities  Skin warm and well perfused    PENDING AM LABS Pt seen and examined at bedside. No acute events overnight. Afebrile and hemodynamically stable, non-tachycardic. Pt underwent gastrograffin challenge yesterday which was productive of stool, pt remains passing flatus, abd XR post-gastrograffin demonstrated contrast in the colon. Ambulated yesterday through the halls. PT eval for home with home PT. NG output minimal.    Vital Signs Last 24 Hrs  T(C): 36.8 (01 Nov 2024 05:51), Max: 36.9 (31 Oct 2024 11:30)  T(F): 98.2 (01 Nov 2024 05:51), Max: 98.4 (31 Oct 2024 11:30)  HR: 61 (01 Nov 2024 05:51) (61 - 70)  BP: 170/74 (01 Nov 2024 05:51) (151/77 - 170/74)  BP(mean): 106 (01 Nov 2024 05:51) (102 - 106)  RR: 18 (01 Nov 2024 05:51) (18 - 18)  SpO2: 96% (01 Nov 2024 05:51) (95% - 97%)  Parameters below as of 01 Nov 2024 05:51  Patient On (Oxygen Delivery Method): room air    General: stable in no acute distress  HEENT: NGT in place to LIWS, bilious fluid in the canister, flushed and patent  Resp: normal effort on RA  Abd: soft, mildly tender about midline incision, Prevena in place to suction that is well kept, minimal output in canister  FROM all four extremities  Skin warm and well perfused                          12.5   7.16  )-----------( 249      ( 01 Nov 2024 07:45 )             37.6     11-01    141  |  109[H]  |  25[H]  ----------------------------<  83  4.6   |  25  |  0.98    Ca    8.4      01 Nov 2024 07:45  Phos  2.4     11-01  Mg     2.6     11-01

## 2024-11-01 NOTE — PROGRESS NOTE ADULT - ATTENDING COMMENTS
As in above full notation.  I personally seen/ examined during daily rounds.  Vitals non-suggestive. High BP tx with Norvasc  Patient tolerated GGC AXR with contrast in colon, (+) flatus and BM, NGT removed.   Abdomen soft with appropriate incisional tenderness. Prevena removed, wound clean and intact with staples  Labs reassuring.  Clinically, improving overall.  Surgically, stable at present.  Continue CLD today adv in the AM   Reviewed with patient in detail, Surgical team as well as RN's.

## 2024-11-01 NOTE — PROGRESS NOTE ADULT - ASSESSMENT
74M recovering s/p above procedure performed for closed loop small bowel obstruction due to internal hernia from previous billroth 2 surgery, passing gas, recovering well so far.    - NPO  - will pull NG tube today and begin clear liquid diet, will advance as tolerated  - DVT ppx  - ambulate with assistance - PT evaluation for home with home PT  - multimodal pain control

## 2024-11-01 NOTE — DISCHARGE NOTE PROVIDER - NSDCFUADDINST_GEN_ALL_CORE_FT
You had a large surgery with small bowel resection for small bowel obstruction.    WOUND CARE  You have one large incision in your abdomen which is closed with staples that will be removed at your follow up visit. You may choose to keep the incision covered if you wish with gauze and tape if the staples are bothersome or snag at your clothing.    PAIN  PAIN CONTROL  Please take over the counter Tylenol and Motrin. You may take Tylenol (acetaminophen) 650mg every 6 hours, alternating with Motrin (ibuprofen) 600mg every 6 hours. For instance, at 9am take Tylenol, then at 12 noon take Motrin, then at 3pm take Tylenol, and so on. Do not exceed more than 4000mg Tylenol (acetaminophen) in 24 hours.  Do not exceed more than 2400mg Motrin (ibuprofen) in 24 hours. For pain not well controlled by the above regimen, you may take Oxycodone 5mg every 6 hours as needed.    DIET  There is no restriction to what you may eat. But if you feel nauseous or vomit with any particular foods, scale back to softer foods until you can tolerate more solid foods. Listen to your body.    ACTIVITY  Refrain from lifting anything over 15 pounds for the next 4-6 weeks until your surgeon clears you to do so. You had a large surgery with small bowel resection for small bowel obstruction.    WOUND CARE  You have one large incision in your abdomen which is closed with staples that will be removed at your follow up visit. You may choose to keep the incision covered if you wish with gauze and tape if the staples are bothersome or snag at your clothing.    PAIN  PAIN CONTROL  Please take over the counter Tylenol and Motrin. You may take Tylenol (acetaminophen) 650mg every 6 hours, alternating with Motrin (ibuprofen) 600mg every 6 hours. For instance, at 9am take Tylenol, then at 12 noon take Motrin, then at 3pm take Tylenol, and so on. Do not exceed more than 4000mg Tylenol (acetaminophen) in 24 hours.  Do not exceed more than 2400mg Motrin (ibuprofen) in 24 hours. For pain not well controlled by the above regimen, you may take Oxycodone 5mg every 6 hours as needed.    DIET  There is no restriction to what you may eat. But if you feel nauseous or vomit with any particular foods, scale back to softer foods until you can tolerate more solid foods. Listen to your body.    ACTIVITY  Refrain from lifting anything over 15 pounds for the next 4-6 weeks until your surgeon clears you to do so. You may walk and navigate stairs, and are encouraged to do so.    FOLLOW UP  Please continue all home medications as previously prescribed.  Please follow up with Dr. Toro in her office within 2 weeks of discharge for check in and for staple removal.  Please follow up with your primary care provider within 2 weeks of discharge.

## 2024-11-01 NOTE — DISCHARGE NOTE PROVIDER - HOSPITAL COURSE
70 y/o M w/PMH gastric ulcer (s/p partial gastrectomy and Bilroth II reconstruction in 1970s), VASILIY, HTN , PSH s/p appendectomy (1980s), s/p hydrocele (1990s) presents to [facility] ER c/o abd pain since last night. Wife endorses that pt ate spaghetti and Italian sausage for dinner. Pt developed diffuse abd pain after, associated with nausea and yellow vomiting. States relief in abd pain after vomiting. Last BM this morning. Has not passed gas today. Denies fever, chills, diarrhea, constipation, melena, BRBPR, hematochezia. Pt had partial gastrectomy and Bilroth II for gastric ulcer over 40 years ago. States never had complication after surgery, including SBO. Patient's CT findings were suggestive of internal hernia causing closed loop small bowel obstruction, and patient was brought expeditiously to the OR for ex lap with lysis of adhesions, reduction of internal hernia, and small bowel resection. He was kept NPO with NGT to suction for few days, which was subsequently pulled, and patient tolerated advancing diets, pain was well controlled, had bowel function, and patient was discharged in good condition and with close follow up.

## 2024-11-01 NOTE — CHART NOTE - NSCHARTNOTEFT_GEN_A_CORE
Notified by RN for pt's high BP at 197/79 rechecked to 185/93. Examined pt at the time and he was asymptomatic. BP was rechecked shortly after with 176/86. Pt has a hx of HTN and currently taking Norvasc, reordered Norvasc 5mg for BP control. Pt without any other acute complaints.
PROCEDURE: ex lap, lysis of adhesions, small bowel resection    Pt seen and examined at bedside s/p above procedure. Pt complaining of significant pain despite PCA pump, has not received any ofirmev yet. Pt's post op labs were unremarkable, and vitals have been stable with HR 77, normotensive, afebrile. Pt with NGT in place with minimal gastric contents in canister, ryan in place with yellow urine. Has not attempted to ambulate, denies any bm's or flatus since surgery.    Vital Signs Last 24 Hrs  T(C): 36.4 (29 Oct 2024 00:25), Max: 37 (28 Oct 2024 10:18)  T(F): 97.5 (29 Oct 2024 00:25), Max: 98.6 (28 Oct 2024 10:18)  HR: 77 (29 Oct 2024 00:25) (66 - 77)  BP: 155/68 (29 Oct 2024 00:25) (117/66 - 186/98)  BP(mean): 97 (29 Oct 2024 00:25) (82 - 97)  RR: 18 (29 Oct 2024 00:25) (12 - 18)  SpO2: 94% (29 Oct 2024 00:25) (92% - 99%)  Parameters below as of 29 Oct 2024 00:25  Patient On (Oxygen Delivery Method): room air    General: stable in no acute distress  HEENT: NGT in place to LIWS  Resp: normal effort on RA  Abd: soft, mildly tender about midline incision, Prevena in place to suction that is well kept, minimal output in canister  Ryan in place with yellow urine  FROM all four extremities  Skin warm and well perfused                          13.6   12.14 )-----------( 231      ( 28 Oct 2024 22:50 )             39.6     10-28    141  |  110[H]  |  13  ----------------------------<  210[H]  4.2   |  25  |  1.25    Ca    7.6[L]      28 Oct 2024 22:50  Phos  3.3     10-28  Mg     1.6     10-28    TPro  7.7  /  Alb  3.9  /  TBili  0.6  /  DBili  x   /  AST  25  /  ALT  24  /  AlkPhos  108  10-28    A/P  74M recovering s/p above procedure performed for closed loop small bowel obstruction due to internal hernia from previous billroth 2 surgery.  - NPO  - NGT to LIWS  - SCD's for now, DVT ppx to begin in am  - ambulate with assistance - PT evaluation  - multimodal pain control with PCA pump

## 2024-11-01 NOTE — DISCHARGE NOTE PROVIDER - NSDCMRMEDTOKEN_GEN_ALL_CORE_FT
aluminum hydroxide-magnesium hydroxide 200 mg-200 mg/5 mL oral suspension: 30 milliliter(s) orally every 4 hours, As needed, Dyspepsia  ferrous sulfate 325 mg (65 mg elemental iron) oral tablet: 1 tab(s) orally once a day  Norvasc 5 mg oral tablet: 1 tab(s) orally once a day  polyethylene glycol 3350 oral powder for reconstitution: 17 gram(s) orally once a day, As Needed -for constipation   Protonix 40 mg oral delayed release tablet: 1 tab(s) orally every 12 hours for 13 days then, once a day   senna oral tablet: 2 tab(s) orally once a day (at bedtime)  sucralfate 1 g/10 mL oral suspension: 10 milliliter(s) orally 4 times a day   aluminum hydroxide-magnesium hydroxide 200 mg-200 mg/5 mL oral suspension: 30 milliliter(s) orally every 4 hours, As needed, Dyspepsia  ferrous sulfate 325 mg (65 mg elemental iron) oral tablet: 1 tab(s) orally once a day  Norvasc 5 mg oral tablet: 1 tab(s) orally once a day  oxyCODONE 5 mg oral tablet: 1 tab(s) orally every 6 hours as needed for  severe pain MDD: 4 tabs  polyethylene glycol 3350 oral powder for reconstitution: 17 gram(s) orally once a day, As Needed -for constipation   Protonix 40 mg oral delayed release tablet: 1 tab(s) orally every 12 hours for 13 days then, once a day   senna oral tablet: 2 tab(s) orally once a day (at bedtime)  sucralfate 1 g/10 mL oral suspension: 10 milliliter(s) orally 4 times a day

## 2024-11-02 LAB
ANION GAP SERPL CALC-SCNC: 4 MMOL/L — LOW (ref 5–17)
BUN SERPL-MCNC: 12 MG/DL — SIGNIFICANT CHANGE UP (ref 7–18)
CALCIUM SERPL-MCNC: 8.2 MG/DL — LOW (ref 8.4–10.5)
CHLORIDE SERPL-SCNC: 108 MMOL/L — SIGNIFICANT CHANGE UP (ref 96–108)
CO2 SERPL-SCNC: 29 MMOL/L — SIGNIFICANT CHANGE UP (ref 22–31)
CREAT SERPL-MCNC: 0.75 MG/DL — SIGNIFICANT CHANGE UP (ref 0.5–1.3)
EGFR: 95 ML/MIN/1.73M2 — SIGNIFICANT CHANGE UP
GLUCOSE SERPL-MCNC: 117 MG/DL — HIGH (ref 70–99)
HCT VFR BLD CALC: 35.7 % — LOW (ref 39–50)
HGB BLD-MCNC: 12 G/DL — LOW (ref 13–17)
MAGNESIUM SERPL-MCNC: 2.4 MG/DL — SIGNIFICANT CHANGE UP (ref 1.6–2.6)
MCHC RBC-ENTMCNC: 27.6 PG — SIGNIFICANT CHANGE UP (ref 27–34)
MCHC RBC-ENTMCNC: 33.6 G/DL — SIGNIFICANT CHANGE UP (ref 32–36)
MCV RBC AUTO: 82.1 FL — SIGNIFICANT CHANGE UP (ref 80–100)
NRBC # BLD: 0 /100 WBCS — SIGNIFICANT CHANGE UP (ref 0–0)
PHOSPHATE SERPL-MCNC: 2.8 MG/DL — SIGNIFICANT CHANGE UP (ref 2.5–4.5)
PLATELET # BLD AUTO: 250 K/UL — SIGNIFICANT CHANGE UP (ref 150–400)
POTASSIUM SERPL-MCNC: 4 MMOL/L — SIGNIFICANT CHANGE UP (ref 3.5–5.3)
POTASSIUM SERPL-SCNC: 4 MMOL/L — SIGNIFICANT CHANGE UP (ref 3.5–5.3)
RBC # BLD: 4.35 M/UL — SIGNIFICANT CHANGE UP (ref 4.2–5.8)
RBC # FLD: 14.3 % — SIGNIFICANT CHANGE UP (ref 10.3–14.5)
SODIUM SERPL-SCNC: 141 MMOL/L — SIGNIFICANT CHANGE UP (ref 135–145)
WBC # BLD: 5.92 K/UL — SIGNIFICANT CHANGE UP (ref 3.8–10.5)
WBC # FLD AUTO: 5.92 K/UL — SIGNIFICANT CHANGE UP (ref 3.8–10.5)

## 2024-11-02 RX ORDER — IBUPROFEN 200 MG
600 TABLET ORAL EVERY 6 HOURS
Refills: 0 | Status: DISCONTINUED | OUTPATIENT
Start: 2024-11-02 | End: 2024-11-03

## 2024-11-02 RX ORDER — TRAMADOL HYDROCHLORIDE 50 MG/1
25 TABLET, COATED ORAL EVERY 6 HOURS
Refills: 0 | Status: DISCONTINUED | OUTPATIENT
Start: 2024-11-02 | End: 2024-11-03

## 2024-11-02 RX ORDER — OXYCODONE HYDROCHLORIDE 30 MG/1
5 TABLET ORAL EVERY 4 HOURS
Refills: 0 | Status: DISCONTINUED | OUTPATIENT
Start: 2024-11-02 | End: 2024-11-03

## 2024-11-02 RX ORDER — ACETAMINOPHEN 500 MG
650 TABLET ORAL EVERY 6 HOURS
Refills: 0 | Status: DISCONTINUED | OUTPATIENT
Start: 2024-11-02 | End: 2024-11-03

## 2024-11-02 RX ADMIN — KETOROLAC TROMETHAMINE 15 MILLIGRAM(S): 30 INJECTION INTRAMUSCULAR; INTRAVENOUS at 00:46

## 2024-11-02 RX ADMIN — Medication 650 MILLIGRAM(S): at 11:54

## 2024-11-02 RX ADMIN — Medication 3 MILLIGRAM(S): at 22:14

## 2024-11-02 RX ADMIN — KETOROLAC TROMETHAMINE 15 MILLIGRAM(S): 30 INJECTION INTRAMUSCULAR; INTRAVENOUS at 07:02

## 2024-11-02 RX ADMIN — HEPARIN SODIUM 5000 UNIT(S): 10000 INJECTION INTRAVENOUS; SUBCUTANEOUS at 07:02

## 2024-11-02 RX ADMIN — PANTOPRAZOLE SODIUM 40 MILLIGRAM(S): 40 TABLET, DELAYED RELEASE ORAL at 11:54

## 2024-11-02 RX ADMIN — KETOROLAC TROMETHAMINE 15 MILLIGRAM(S): 30 INJECTION INTRAMUSCULAR; INTRAVENOUS at 07:39

## 2024-11-02 RX ADMIN — Medication 650 MILLIGRAM(S): at 12:31

## 2024-11-02 RX ADMIN — HEPARIN SODIUM 5000 UNIT(S): 10000 INJECTION INTRAVENOUS; SUBCUTANEOUS at 22:14

## 2024-11-02 RX ADMIN — Medication 650 MILLIGRAM(S): at 19:10

## 2024-11-02 RX ADMIN — HEPARIN SODIUM 5000 UNIT(S): 10000 INJECTION INTRAVENOUS; SUBCUTANEOUS at 14:57

## 2024-11-02 RX ADMIN — Medication 1 TABLET(S): at 11:55

## 2024-11-02 RX ADMIN — KETOROLAC TROMETHAMINE 15 MILLIGRAM(S): 30 INJECTION INTRAMUSCULAR; INTRAVENOUS at 01:30

## 2024-11-02 RX ADMIN — ONDANSETRON HYDROCHLORIDE 4 MILLIGRAM(S): 2 INJECTION, SOLUTION INTRAMUSCULAR; INTRAVENOUS at 00:51

## 2024-11-02 RX ADMIN — Medication 650 MILLIGRAM(S): at 17:08

## 2024-11-02 RX ADMIN — DIBASIC SODIUM PHOSPHATE, MONOBASIC POTASSIUM PHOSPHATE AND MONOBASIC SODIUM PHOSPHATE 1 PACKET(S): 852; 155; 130 TABLET ORAL at 08:41

## 2024-11-02 NOTE — PROGRESS NOTE ADULT - SUBJECTIVE AND OBJECTIVE BOX
INTERVAL HPI/OVERNIGHT EVENTS:  Pt resting comfortably. No acute complaints. Improved abd pain. Tolerating clears. Voiding. Passing flatus/bowel movements. Denies N/V/F/C.     MEDICATIONS  (STANDING):  heparin   Injectable 5000 Unit(s) SubCutaneous every 8 hours  influenza  Vaccine (HIGH DOSE) 0.5 milliLiter(s) IntraMuscular once  ketorolac   Injectable 15 milliGRAM(s) IV Push every 6 hours  lactated ringers. 1000 milliLiter(s) (50 mL/Hr) IV Continuous <Continuous>  melatonin 3 milliGRAM(s) Oral at bedtime  multivitamin 1 Tablet(s) Oral daily  pantoprazole  Injectable 40 milliGRAM(s) IV Push daily    MEDICATIONS  (PRN):  HYDROmorphone  Injectable 0.5 milliGRAM(s) IV Push every 4 hours PRN Severe Pain (7 - 10)  naloxone Injectable 0.1 milliGRAM(s) IV Push every 3 minutes PRN For ANY of the following changes in patient status:  A. RR LESS THAN 10 breaths per minute, B. Oxygen saturation LESS THAN 90%, C. Sedation score of 6  ondansetron Injectable 4 milliGRAM(s) IV Push every 6 hours PRN Nausea  oxyCODONE    IR 5 milliGRAM(s) Oral every 4 hours PRN Moderate Pain (4 - 6)      Vital Signs Last 24 Hrs  T(C): 36.9 (02 Nov 2024 05:47), Max: 36.9 (02 Nov 2024 00:03)  T(F): 98.5 (02 Nov 2024 05:47), Max: 98.5 (02 Nov 2024 05:47)  HR: 59 (02 Nov 2024 05:47) (59 - 63)  BP: 154/83 (02 Nov 2024 05:47) (154/83 - 197/79)  BP(mean): 107 (02 Nov 2024 05:47) (106 - 107)  RR: 18 (02 Nov 2024 05:47) (18 - 18)  SpO2: 96% (02 Nov 2024 05:47) (95% - 98%)    Parameters below as of 02 Nov 2024 05:47  Patient On (Oxygen Delivery Method): room air        Physical:  General: A&Ox3. NAD. Comfortable.   Resp: Unlabored breathing.   Abdomen: Soft nondistended, nontender to palpation diffusely. Midline dressing clean, dry, intact. No volutnary guarding, no rebound tenderness, no palpable masses.   Extremities: no calf tenderness b/l.     I&O's Detail    01 Nov 2024 07:01  -  02 Nov 2024 07:00  --------------------------------------------------------  IN:  Total IN: 0 mL    OUT:    Voided (mL): 700 mL  Total OUT: 700 mL    Total NET: -700 mL          LABS:                        12.0   5.92  )-----------( 250      ( 02 Nov 2024 06:40 )             35.7             11-02    141  |  108  |  12  ----------------------------<  117[H]  4.0   |  29  |  0.75    Ca    8.2[L]      02 Nov 2024 06:40  Phos  2.8     11-02  Mg     2.4     11-02        74y.o. Male

## 2024-11-02 NOTE — PROGRESS NOTE ADULT - ASSESSMENT
74M recovering s/p above procedure performed for closed loop small bowel obstruction due to internal hernia from previous billroth 2 surgery, passing gas, recovering well so far.  VSS, afebrile, no wt ct, H&H stable    Plan  - adv as tolerated  - pain / nausea control prn   - ambulation / OOB as tolerated  - DVT ppx  - IS   - dc planning

## 2024-11-03 VITALS
DIASTOLIC BLOOD PRESSURE: 79 MMHG | HEART RATE: 70 BPM | OXYGEN SATURATION: 97 % | RESPIRATION RATE: 18 BRPM | SYSTOLIC BLOOD PRESSURE: 145 MMHG | TEMPERATURE: 98 F

## 2024-11-03 RX ADMIN — HEPARIN SODIUM 5000 UNIT(S): 10000 INJECTION INTRAVENOUS; SUBCUTANEOUS at 05:39

## 2024-11-03 RX ADMIN — PANTOPRAZOLE SODIUM 40 MILLIGRAM(S): 40 TABLET, DELAYED RELEASE ORAL at 12:44

## 2024-11-03 RX ADMIN — Medication 0.5 MILLILITER(S): at 16:35

## 2024-11-03 RX ADMIN — Medication 50 MILLILITER(S): at 05:38

## 2024-11-03 RX ADMIN — HEPARIN SODIUM 5000 UNIT(S): 10000 INJECTION INTRAVENOUS; SUBCUTANEOUS at 15:29

## 2024-11-03 RX ADMIN — Medication 600 MILLIGRAM(S): at 08:54

## 2024-11-03 RX ADMIN — Medication 650 MILLIGRAM(S): at 05:38

## 2024-11-03 RX ADMIN — Medication 600 MILLIGRAM(S): at 08:00

## 2024-11-03 RX ADMIN — Medication 1 TABLET(S): at 12:47

## 2024-11-03 RX ADMIN — Medication 650 MILLIGRAM(S): at 12:47

## 2024-11-03 RX ADMIN — Medication 650 MILLIGRAM(S): at 06:05

## 2024-11-03 RX ADMIN — Medication 650 MILLIGRAM(S): at 13:56

## 2024-11-03 NOTE — PROGRESS NOTE ADULT - NS ATTEND AMEND GEN_ALL_CORE FT
I have personally seen and examined the patient with surgical team. Agree with the history, physical exam, and plan as documented by the PA.   As in above full notation.  Vitals non-suggestive.  Wounds clean and abdomen soft with appropriate incisional tenderness.  Labs reassuring.  Clinically, improving overall.  Surgically, stable at present.  Discharge home today, f/u in clinic in 2wks  Reviewed with patient in detail, Hospitalist, Surgical team as well as RN's.
I have personally seen and examined the patient with surgical team. Agree with the history, physical exam, and plan as documented by the PA.   Vitals non-suggestive.  Wounds clean and abdomen soft with appropriate incisional tenderness.  Labs reassuring.  Clinically, improving overall.  Surgically, stable at present.  Adv to soft diet today and low fiber tomorrow.   GÉNESIS planning for Sunday

## 2024-11-03 NOTE — PROGRESS NOTE ADULT - SUBJECTIVE AND OBJECTIVE BOX
INTERVAL HPI/OVERNIGHT EVENTS:    Pt seen and examined at bedside. No acute complaints. Tolerating regular diet. Continues to have flatus, has had 1 bowel movement since surgery.  Denies abdominal pain, nausea or vomiting.     Vital Signs Last 24 Hrs  T(C): 36.7 (03 Nov 2024 05:25), Max: 36.9 (02 Nov 2024 14:08)  T(F): 98 (03 Nov 2024 05:25), Max: 98.4 (02 Nov 2024 14:08)  HR: 65 (03 Nov 2024 05:25) (60 - 73)  BP: 153/85 (03 Nov 2024 05:25) (134/76 - 162/89)  BP(mean): 113 (02 Nov 2024 14:08) (113 - 113)  RR: 16 (03 Nov 2024 05:25) (16 - 18)  SpO2: 97% (03 Nov 2024 05:25) (95% - 97%)    Parameters below as of 03 Nov 2024 05:25  Patient On (Oxygen Delivery Method): room air      I&O's Detail    02 Nov 2024 08:01  -  03 Nov 2024 07:00  --------------------------------------------------------  IN:    Oral Fluid: 530 mL  Total IN: 530 mL    OUT:    Nasogastric/Oral tube (mL): 400 mL    Voided (mL): 700 mL  Total OUT: 1100 mL    Total NET: -570 mL        naloxone Injectable 0.1 milliGRAM(s) IV Push every 3 minutes PRN  pantoprazole  Injectable 40 milliGRAM(s) IV Push daily      Physical Exam  General: No acute distress  Abdomen: softly distended, expected incisional tenderness, no rebound tenderness, no guarding, no palpable masses  Midline abdominal wound with staples in place, no erythema or active drainage, healing well  Extremities: non edematous, no calf pain bilaterally    Drains/Tubes:     Labs:                        12.0   5.92  )-----------( 250      ( 02 Nov 2024 06:40 )             35.7     11-02    141  |  108  |  12  ----------------------------<  117[H]  4.0   |  29  |  0.75    Ca    8.2[L]      02 Nov 2024 06:40  Phos  2.8     11-02  Mg     2.4     11-02          RADIOLOGY & ADDITIONAL STUDIES:    74yMale

## 2024-11-03 NOTE — PROGRESS NOTE ADULT - PROVIDER SPECIALTY LIST ADULT
Muna Montalvo is a 66 year old female presenting with N39.3 (ICD-10-CM) - Stress incontinence in female    Chief Complaint   Patient presents with   • Consultation       Patient denies allergy to latex.    Medication list reviewed:  yes    Patient would like communication of their results via:        Cell Phone:   Telephone Information:   Mobile [phone]     Okay to leave a message containing results? Yes    PCP:  Gibran Chopra MD          
Surgery

## 2024-11-03 NOTE — PROGRESS NOTE ADULT - ASSESSMENT
74M recovering s/p above procedure performed for closed loop small bowel obstruction due to internal hernia from previous billroth 2 surgery, passing gas, recovering well so far.  VSS, afebrile    Plan  - continue reg diet  - pain / nausea control prn   - ambulation / OOB as tolerated  - DVT ppx  - IS   - dc home today  - Discussed with Dr. Mcgovern

## 2024-11-06 ENCOUNTER — INPATIENT (INPATIENT)
Facility: HOSPITAL | Age: 74
LOS: 20 days | Discharge: HOME | DRG: 390 | End: 2024-11-27
Attending: STUDENT IN AN ORGANIZED HEALTH CARE EDUCATION/TRAINING PROGRAM | Admitting: STUDENT IN AN ORGANIZED HEALTH CARE EDUCATION/TRAINING PROGRAM
Payer: COMMERCIAL

## 2024-11-06 VITALS
WEIGHT: 141.98 LBS | RESPIRATION RATE: 17 BRPM | TEMPERATURE: 98 F | SYSTOLIC BLOOD PRESSURE: 163 MMHG | HEIGHT: 66 IN | DIASTOLIC BLOOD PRESSURE: 80 MMHG | OXYGEN SATURATION: 96 % | HEART RATE: 72 BPM

## 2024-11-06 DIAGNOSIS — K56.609 UNSPECIFIED INTESTINAL OBSTRUCTION, UNSPECIFIED AS TO PARTIAL VERSUS COMPLETE OBSTRUCTION: ICD-10-CM

## 2024-11-06 DIAGNOSIS — Z90.49 ACQUIRED ABSENCE OF OTHER SPECIFIED PARTS OF DIGESTIVE TRACT: Chronic | ICD-10-CM

## 2024-11-06 DIAGNOSIS — Z98.0 INTESTINAL BYPASS AND ANASTOMOSIS STATUS: Chronic | ICD-10-CM

## 2024-11-06 DIAGNOSIS — Z90.3 ACQUIRED ABSENCE OF STOMACH [PART OF]: Chronic | ICD-10-CM

## 2024-11-06 LAB
ALBUMIN SERPL ELPH-MCNC: 3 G/DL — LOW (ref 3.5–5)
ALP SERPL-CCNC: 271 U/L — HIGH (ref 40–120)
ALT FLD-CCNC: 140 U/L DA — HIGH (ref 10–60)
ANION GAP SERPL CALC-SCNC: 6 MMOL/L — SIGNIFICANT CHANGE UP (ref 5–17)
APTT BLD: 29.7 SEC — SIGNIFICANT CHANGE UP (ref 24.5–35.6)
AST SERPL-CCNC: 99 U/L — HIGH (ref 10–40)
BASOPHILS # BLD AUTO: 0.03 K/UL — SIGNIFICANT CHANGE UP (ref 0–0.2)
BASOPHILS NFR BLD AUTO: 0.3 % — SIGNIFICANT CHANGE UP (ref 0–2)
BILIRUB SERPL-MCNC: 1 MG/DL — SIGNIFICANT CHANGE UP (ref 0.2–1.2)
BUN SERPL-MCNC: 14 MG/DL — SIGNIFICANT CHANGE UP (ref 7–18)
CALCIUM SERPL-MCNC: 8.7 MG/DL — SIGNIFICANT CHANGE UP (ref 8.4–10.5)
CHLORIDE SERPL-SCNC: 103 MMOL/L — SIGNIFICANT CHANGE UP (ref 96–108)
CO2 SERPL-SCNC: 30 MMOL/L — SIGNIFICANT CHANGE UP (ref 22–31)
CREAT SERPL-MCNC: 0.85 MG/DL — SIGNIFICANT CHANGE UP (ref 0.5–1.3)
EGFR: 91 ML/MIN/1.73M2 — SIGNIFICANT CHANGE UP
EOSINOPHIL # BLD AUTO: 0 K/UL — SIGNIFICANT CHANGE UP (ref 0–0.5)
EOSINOPHIL NFR BLD AUTO: 0 % — SIGNIFICANT CHANGE UP (ref 0–6)
GLUCOSE SERPL-MCNC: 134 MG/DL — HIGH (ref 70–99)
HCT VFR BLD CALC: 39.1 % — SIGNIFICANT CHANGE UP (ref 39–50)
HGB BLD-MCNC: 12.9 G/DL — LOW (ref 13–17)
IMM GRANULOCYTES NFR BLD AUTO: 1.6 % — HIGH (ref 0–0.9)
INR BLD: 1.1 RATIO — SIGNIFICANT CHANGE UP (ref 0.85–1.16)
LACTATE SERPL-SCNC: 1.7 MMOL/L — SIGNIFICANT CHANGE UP (ref 0.7–2)
LIDOCAIN IGE QN: 336 U/L — HIGH (ref 13–75)
LYMPHOCYTES # BLD AUTO: 0.63 K/UL — LOW (ref 1–3.3)
LYMPHOCYTES # BLD AUTO: 5.7 % — LOW (ref 13–44)
MCHC RBC-ENTMCNC: 27.9 PG — SIGNIFICANT CHANGE UP (ref 27–34)
MCHC RBC-ENTMCNC: 33 G/DL — SIGNIFICANT CHANGE UP (ref 32–36)
MCV RBC AUTO: 84.4 FL — SIGNIFICANT CHANGE UP (ref 80–100)
MONOCYTES # BLD AUTO: 0.55 K/UL — SIGNIFICANT CHANGE UP (ref 0–0.9)
MONOCYTES NFR BLD AUTO: 5 % — SIGNIFICANT CHANGE UP (ref 2–14)
NEUTROPHILS # BLD AUTO: 9.61 K/UL — HIGH (ref 1.8–7.4)
NEUTROPHILS NFR BLD AUTO: 87.4 % — HIGH (ref 43–77)
NRBC # BLD: 0 /100 WBCS — SIGNIFICANT CHANGE UP (ref 0–0)
PLATELET # BLD AUTO: 449 K/UL — HIGH (ref 150–400)
POTASSIUM SERPL-MCNC: 4 MMOL/L — SIGNIFICANT CHANGE UP (ref 3.5–5.3)
POTASSIUM SERPL-SCNC: 4 MMOL/L — SIGNIFICANT CHANGE UP (ref 3.5–5.3)
PROT SERPL-MCNC: 7.3 G/DL — SIGNIFICANT CHANGE UP (ref 6–8.3)
PROTHROM AB SERPL-ACNC: 12.8 SEC — SIGNIFICANT CHANGE UP (ref 9.9–13.4)
RBC # BLD: 4.63 M/UL — SIGNIFICANT CHANGE UP (ref 4.2–5.8)
RBC # FLD: 14.9 % — HIGH (ref 10.3–14.5)
SODIUM SERPL-SCNC: 139 MMOL/L — SIGNIFICANT CHANGE UP (ref 135–145)
TROPONIN I, HIGH SENSITIVITY RESULT: 4.6 NG/L — SIGNIFICANT CHANGE UP
WBC # BLD: 11 K/UL — HIGH (ref 3.8–10.5)
WBC # FLD AUTO: 11 K/UL — HIGH (ref 3.8–10.5)

## 2024-11-06 PROCEDURE — 74177 CT ABD & PELVIS W/CONTRAST: CPT | Mod: 26,MC

## 2024-11-06 PROCEDURE — 74019 RADEX ABDOMEN 2 VIEWS: CPT | Mod: 26

## 2024-11-06 PROCEDURE — 71045 X-RAY EXAM CHEST 1 VIEW: CPT | Mod: 26,77

## 2024-11-06 PROCEDURE — 99232 SBSQ HOSP IP/OBS MODERATE 35: CPT

## 2024-11-06 PROCEDURE — 99285 EMERGENCY DEPT VISIT HI MDM: CPT

## 2024-11-06 PROCEDURE — 71045 X-RAY EXAM CHEST 1 VIEW: CPT | Mod: 26

## 2024-11-06 RX ORDER — CEFTRIAXONE SODIUM 1 G
1000 VIAL (EA) INJECTION EVERY 24 HOURS
Refills: 0 | Status: COMPLETED | OUTPATIENT
Start: 2024-11-06 | End: 2024-11-12

## 2024-11-06 RX ORDER — ONDANSETRON HYDROCHLORIDE 4 MG/1
4 TABLET, FILM COATED ORAL EVERY 6 HOURS
Refills: 0 | Status: DISCONTINUED | OUTPATIENT
Start: 2024-11-06 | End: 2024-11-27

## 2024-11-06 RX ORDER — ONDANSETRON HYDROCHLORIDE 4 MG/1
4 TABLET, FILM COATED ORAL ONCE
Refills: 0 | Status: COMPLETED | OUTPATIENT
Start: 2024-11-06 | End: 2024-11-06

## 2024-11-06 RX ORDER — SODIUM CHLORIDE 9 MG/ML
1000 INJECTION, SOLUTION INTRAMUSCULAR; INTRAVENOUS; SUBCUTANEOUS ONCE
Refills: 0 | Status: COMPLETED | OUTPATIENT
Start: 2024-11-06 | End: 2024-11-06

## 2024-11-06 RX ORDER — 0.9 % SODIUM CHLORIDE 0.9 %
1000 INTRAVENOUS SOLUTION INTRAVENOUS
Refills: 0 | Status: DISCONTINUED | OUTPATIENT
Start: 2024-11-06 | End: 2024-11-13

## 2024-11-06 RX ORDER — METRONIDAZOLE 500 MG/1
500 TABLET ORAL EVERY 8 HOURS
Refills: 0 | Status: DISCONTINUED | OUTPATIENT
Start: 2024-11-06 | End: 2024-11-18

## 2024-11-06 RX ADMIN — Medication 100 MILLIGRAM(S): at 22:13

## 2024-11-06 RX ADMIN — ONDANSETRON HYDROCHLORIDE 4 MILLIGRAM(S): 4 TABLET, FILM COATED ORAL at 17:27

## 2024-11-06 RX ADMIN — METRONIDAZOLE 100 MILLIGRAM(S): 500 TABLET ORAL at 21:05

## 2024-11-06 RX ADMIN — SODIUM CHLORIDE 1000 MILLILITER(S): 9 INJECTION, SOLUTION INTRAMUSCULAR; INTRAVENOUS; SUBCUTANEOUS at 11:49

## 2024-11-06 NOTE — PATIENT PROFILE ADULT - FUNCTIONAL ASSESSMENT - DAILY ACTIVITY 5.
pt c/o having chest pain radiating to back starting at 8pm. denies n/v,dizziness. PMH MI (dec 2020) ,stents 4 = No assist / stand by assistance

## 2024-11-06 NOTE — H&P ADULT - NSHPPHYSICALEXAM_GEN_ALL_CORE
GA: AAOx3, NAD  HEENT: atraumatic  CVS: RRR  Pulm: nonlabored, bilateral breath sounds, normal effort  Abd: soft, nontender, midline incision well appearing with staples in place. mildly distended.   : normal  Skin: without rash or lesion  MSK: FROM x4 atraumatic extremities  Neuro: GCS 15, nonfocal exam

## 2024-11-06 NOTE — PATIENT PROFILE ADULT - FALL HARM RISK - RISK INTERVENTIONS
Assistance OOB with selected safe patient handling equipment/Bed in lowest position, wheels locked, appropriate side rails in place/Call bell, personal items and telephone in reach/Instruct patient to call for assistance before getting out of bed or chair

## 2024-11-06 NOTE — ED PROVIDER NOTE - OBJECTIVE STATEMENT
Patient is a 74-year-old gentleman with a past medical history of hypertension, gastrectomy, recent SBO discharged 3 days ago status post ex lap and lysis of adhesions who presents to the ED because of abdominal distention, and vomiting.  This started last night.  Last bowel movement was 2 days ago.  Similar symptoms to when he had his small bowel obstruction.  No fever, no chest pain, no dysuria.

## 2024-11-06 NOTE — ED ADULT NURSE NOTE - CAS EDN DISCHARGE ASSESSMENT
Neuro vascular intact post splinting Alert and oriented to person, place and time/Neuro vascular intact post splinting

## 2024-11-06 NOTE — H&P ADULT - NSHPLABSRESULTS_GEN_ALL_CORE
12.9   11.00 )-----------( 449      ( 06 Nov 2024 11:30 )             39.1   11-06    139  |  103  |  14  ----------------------------<  134[H]  4.0   |  30  |  0.85    Ca    8.7      06 Nov 2024 11:30    TPro  7.3  /  Alb  3.0[L]  /  TBili  1.0  /  DBili  x   /  AST  99[H]  /  ALT  140[H]  /  AlkPhos  271[H]  11-06

## 2024-11-06 NOTE — H&P ADULT - NSVTERISKREFERASSESS_GEN_ALL_CORE
..  Venipuncture Blood Specimen Collection  Venipuncture performed in Lt arm by Tamika Elizabeth MA with good hemostasis. Patient tolerated the procedure well without complications.   08/20/24   Tamika Elizabeth MA    Refer to the Assessment tab to view/cancel completed assessment.

## 2024-11-06 NOTE — H&P ADULT - ASSESSMENT
74M presenting to ED with nausea/vomiting s/p recent discharge for small bowel obstruction with exlap, small bowel resection.  CT showing proximal obstruction with transition point.   -Admit to floor, Dr. Mcgovern  -pain control  -NPO, IVF  -NG tube to LIS  -monitor bowel function

## 2024-11-06 NOTE — H&P ADULT - HISTORY OF PRESENT ILLNESS
74M presenting to the ED with one day of nausea, vomiting.  Patient recently discharged from [facility] after exploratory laparotomy, lysis of adhesions, small bowel resection for closed loop bowel obstruction.  Discharged 11/3.  CT scan today showing proximal small bowel obstruction.     PMH/PSH: PMH gastric ulcer (s/p partial gastrectomy and Bilroth II reconstruction in 1970s), VASILIY, HTN , PSH s/p appendectomy (1980s), s/p hydrocele (1990s)  74-year-old male with extensive past surgical history presented to ED with nausea and vomiting which started last night.  This patient recently discharged from [facility] after exploratory laparotomy, lysis of adhesions, small bowel resection for closed loop bowel obstruction.  Discharged 11/3.  He states he did not have bowel movement and flatus in the last two days . His appetite was low yesterday and eventually started vomiting last night. He denies fever, chills, SOB. Wound is dry, clean, intact. Labs showed elevated WBC. Vitals WNL. His abdomen is distended with mild tenderness on RLQ. CT scan today showing proximal small bowel obstruction.     PMH/PSH: PMH gastric ulcer (s/p partial gastrectomy and Bilroth II reconstruction in 1970s), VASILIY, HTN , PSH s/p appendectomy (1980s), s/p hydrocele (1990s)

## 2024-11-06 NOTE — ED ADULT NURSE NOTE - OBJECTIVE STATEMENT
s/p gastric surgery patient complaining of vomiting. last vomiting was at 5am. pt also complains of decrease of appetite and a spike in bp .

## 2024-11-06 NOTE — ED PROVIDER NOTE - CLINICAL SUMMARY MEDICAL DECISION MAKING FREE TEXT BOX
Ddx: SBO/ surgical complication  Plan: Cbc, cmp, lactate, ct abd, pain control, surg consult, reassess

## 2024-11-07 PROBLEM — Z00.00 ENCOUNTER FOR PREVENTIVE HEALTH EXAMINATION: Status: ACTIVE | Noted: 2024-11-07

## 2024-11-07 LAB
-  BACTEROIDES FRAGILIS: SIGNIFICANT CHANGE UP
ANION GAP SERPL CALC-SCNC: 8 MMOL/L — SIGNIFICANT CHANGE UP (ref 5–17)
BUN SERPL-MCNC: 14 MG/DL — SIGNIFICANT CHANGE UP (ref 7–18)
CALCIUM SERPL-MCNC: 8.3 MG/DL — LOW (ref 8.4–10.5)
CHLORIDE SERPL-SCNC: 102 MMOL/L — SIGNIFICANT CHANGE UP (ref 96–108)
CO2 SERPL-SCNC: 27 MMOL/L — SIGNIFICANT CHANGE UP (ref 22–31)
CREAT SERPL-MCNC: 0.72 MG/DL — SIGNIFICANT CHANGE UP (ref 0.5–1.3)
EGFR: 96 ML/MIN/1.73M2 — SIGNIFICANT CHANGE UP
GLUCOSE SERPL-MCNC: 105 MG/DL — HIGH (ref 70–99)
GRAM STN FLD: ABNORMAL
GRAM STN FLD: ABNORMAL
HCT VFR BLD CALC: 35.5 % — LOW (ref 39–50)
HGB BLD-MCNC: 12 G/DL — LOW (ref 13–17)
MAGNESIUM SERPL-MCNC: 2.4 MG/DL — SIGNIFICANT CHANGE UP (ref 1.6–2.6)
MCHC RBC-ENTMCNC: 28.3 PG — SIGNIFICANT CHANGE UP (ref 27–34)
MCHC RBC-ENTMCNC: 33.8 G/DL — SIGNIFICANT CHANGE UP (ref 32–36)
MCV RBC AUTO: 83.7 FL — SIGNIFICANT CHANGE UP (ref 80–100)
METHOD TYPE: SIGNIFICANT CHANGE UP
NRBC # BLD: 0 /100 WBCS — SIGNIFICANT CHANGE UP (ref 0–0)
PHOSPHATE SERPL-MCNC: 2.7 MG/DL — SIGNIFICANT CHANGE UP (ref 2.5–4.5)
PLATELET # BLD AUTO: 469 K/UL — HIGH (ref 150–400)
POTASSIUM SERPL-MCNC: 3.7 MMOL/L — SIGNIFICANT CHANGE UP (ref 3.5–5.3)
POTASSIUM SERPL-SCNC: 3.7 MMOL/L — SIGNIFICANT CHANGE UP (ref 3.5–5.3)
RBC # BLD: 4.24 M/UL — SIGNIFICANT CHANGE UP (ref 4.2–5.8)
RBC # FLD: 14.9 % — HIGH (ref 10.3–14.5)
SODIUM SERPL-SCNC: 137 MMOL/L — SIGNIFICANT CHANGE UP (ref 135–145)
SPECIMEN SOURCE: SIGNIFICANT CHANGE UP
SPECIMEN SOURCE: SIGNIFICANT CHANGE UP
SURGICAL PATHOLOGY STUDY: SIGNIFICANT CHANGE UP
WBC # BLD: 11.57 K/UL — HIGH (ref 3.8–10.5)
WBC # FLD AUTO: 11.57 K/UL — HIGH (ref 3.8–10.5)

## 2024-11-07 PROCEDURE — 74018 RADEX ABDOMEN 1 VIEW: CPT | Mod: 26

## 2024-11-07 RX ORDER — ENOXAPARIN SODIUM 30 MG/.3ML
40 INJECTION SUBCUTANEOUS EVERY 24 HOURS
Refills: 0 | Status: DISCONTINUED | OUTPATIENT
Start: 2024-11-07 | End: 2024-11-18

## 2024-11-07 RX ORDER — HYDRALAZINE HYDROCHLORIDE 10 MG/1
10 TABLET ORAL EVERY 6 HOURS
Refills: 0 | Status: DISCONTINUED | OUTPATIENT
Start: 2024-11-07 | End: 2024-11-27

## 2024-11-07 RX ORDER — PANTOPRAZOLE SODIUM 40 MG/1
40 TABLET, DELAYED RELEASE ORAL EVERY 24 HOURS
Refills: 0 | Status: DISCONTINUED | OUTPATIENT
Start: 2024-11-07 | End: 2024-11-18

## 2024-11-07 RX ADMIN — ONDANSETRON HYDROCHLORIDE 4 MILLIGRAM(S): 4 TABLET, FILM COATED ORAL at 21:14

## 2024-11-07 RX ADMIN — ENOXAPARIN SODIUM 40 MILLIGRAM(S): 30 INJECTION SUBCUTANEOUS at 13:55

## 2024-11-07 RX ADMIN — PANTOPRAZOLE SODIUM 40 MILLIGRAM(S): 40 TABLET, DELAYED RELEASE ORAL at 13:55

## 2024-11-07 RX ADMIN — Medication 100 MILLIGRAM(S): at 21:15

## 2024-11-07 RX ADMIN — Medication 90 MILLILITER(S): at 19:28

## 2024-11-07 RX ADMIN — METRONIDAZOLE 100 MILLIGRAM(S): 500 TABLET ORAL at 13:55

## 2024-11-07 RX ADMIN — METRONIDAZOLE 100 MILLIGRAM(S): 500 TABLET ORAL at 05:01

## 2024-11-07 RX ADMIN — METRONIDAZOLE 100 MILLIGRAM(S): 500 TABLET ORAL at 21:15

## 2024-11-07 NOTE — PHYSICAL THERAPY INITIAL EVALUATION ADULT - DIAGNOSIS, PT EVAL
Patient presented with slight impairments in balance during ambulation; patient uses S.cane at home for safe ambulation

## 2024-11-07 NOTE — PROGRESS NOTE ADULT - SUBJECTIVE AND OBJECTIVE BOX
I have seen and examined the patient at bedside. No acute events overnight. Afebrile, NPO with NG tube in place and 600 cc brownish output overnight. He still has mild nausea but much better after NGT. He started passing gas but no BM yet.       Vital Signs Last 24 Hrs  T(C): 36.8 (07 Nov 2024 05:25), Max: 37.2 (06 Nov 2024 16:12)  T(F): 98.2 (07 Nov 2024 05:25), Max: 98.9 (06 Nov 2024 16:12)  HR: 64 (07 Nov 2024 05:25) (64 - 72)  BP: 155/80 (07 Nov 2024 05:25) (155/80 - 170/86)  BP(mean): 108 (06 Nov 2024 21:20) (108 - 108)  RR: 17 (07 Nov 2024 05:25) (16 - 18)  SpO2: 96% (07 Nov 2024 05:25) (96% - 97%)    Parameters below as of 07 Nov 2024 05:25  Patient On (Oxygen Delivery Method): room air    Physical Exam:  General: not in acute distress, AAOx3, resting in bed comfortably  Respiratory: normal respiratory effort, no accessory muscle use  Abdomen: NG tube in place, slightly distended, soft, non-tender  MSK: FROM x4 extremities    MEDICATIONS  (STANDING):  cefTRIAXone   IVPB 1000 milliGRAM(s) IV Intermittent every 24 hours  lactated ringers. 1000 milliLiter(s) (100 mL/Hr) IV Continuous <Continuous>  metroNIDAZOLE  IVPB 500 milliGRAM(s) IV Intermittent every 8 hours    MEDICATIONS  (PRN):  hydrALAZINE Injectable 10 milliGRAM(s) IV Push every 6 hours PRN systolic BP > 160  ondansetron Injectable 4 milliGRAM(s) IV Push every 6 hours PRN Nausea                          12.9   11.00 )-----------( 449      ( 06 Nov 2024 11:30 )             39.1   11-06    139  |  103  |  14  ----------------------------<  134[H]  4.0   |  30  |  0.85    Ca    8.7      06 Nov 2024 11:30    TPro  7.3  /  Alb  3.0[L]  /  TBili  1.0  /  DBili  x   /  AST  99[H]  /  ALT  140[H]  /  AlkPhos  271[H]  11-06

## 2024-11-07 NOTE — PROGRESS NOTE ADULT - ASSESSMENT
74M presenting to ED with nausea/vomiting s/p recent discharge for small bowel obstruction with exlap, small bowel resection.  CT showing proximal obstruction with transition point.   NG tube placed in the ED. 500 + 600 cc brownish output. He feels better and started passing gas.     - NPO, NG tube to LIS, IVF  - pain control  - monitor bowel function  - Zofran PRN  - Ambulate   - Continue ABx

## 2024-11-08 LAB
ANION GAP SERPL CALC-SCNC: 6 MMOL/L — SIGNIFICANT CHANGE UP (ref 5–17)
BUN SERPL-MCNC: 18 MG/DL — SIGNIFICANT CHANGE UP (ref 7–18)
CALCIUM SERPL-MCNC: 8.8 MG/DL — SIGNIFICANT CHANGE UP (ref 8.4–10.5)
CHLORIDE SERPL-SCNC: 102 MMOL/L — SIGNIFICANT CHANGE UP (ref 96–108)
CO2 SERPL-SCNC: 28 MMOL/L — SIGNIFICANT CHANGE UP (ref 22–31)
CREAT SERPL-MCNC: 0.78 MG/DL — SIGNIFICANT CHANGE UP (ref 0.5–1.3)
CULTURE RESULTS: ABNORMAL
EGFR: 94 ML/MIN/1.73M2 — SIGNIFICANT CHANGE UP
GLUCOSE SERPL-MCNC: 107 MG/DL — HIGH (ref 70–99)
HCT VFR BLD CALC: 37.4 % — LOW (ref 39–50)
HGB BLD-MCNC: 12.9 G/DL — LOW (ref 13–17)
MAGNESIUM SERPL-MCNC: 2.6 MG/DL — SIGNIFICANT CHANGE UP (ref 1.6–2.6)
MCHC RBC-ENTMCNC: 28.2 PG — SIGNIFICANT CHANGE UP (ref 27–34)
MCHC RBC-ENTMCNC: 34.5 G/DL — SIGNIFICANT CHANGE UP (ref 32–36)
MCV RBC AUTO: 81.7 FL — SIGNIFICANT CHANGE UP (ref 80–100)
NRBC # BLD: 0 /100 WBCS — SIGNIFICANT CHANGE UP (ref 0–0)
ORGANISM # SPEC MICROSCOPIC CNT: ABNORMAL
ORGANISM # SPEC MICROSCOPIC CNT: ABNORMAL
PHOSPHATE SERPL-MCNC: 2.7 MG/DL — SIGNIFICANT CHANGE UP (ref 2.5–4.5)
PLATELET # BLD AUTO: 531 K/UL — HIGH (ref 150–400)
POTASSIUM SERPL-MCNC: 4.7 MMOL/L — SIGNIFICANT CHANGE UP (ref 3.5–5.3)
POTASSIUM SERPL-SCNC: 4.7 MMOL/L — SIGNIFICANT CHANGE UP (ref 3.5–5.3)
RBC # BLD: 4.58 M/UL — SIGNIFICANT CHANGE UP (ref 4.2–5.8)
RBC # FLD: 15.2 % — HIGH (ref 10.3–14.5)
SODIUM SERPL-SCNC: 136 MMOL/L — SIGNIFICANT CHANGE UP (ref 135–145)
SPECIMEN SOURCE: SIGNIFICANT CHANGE UP
WBC # BLD: 11.77 K/UL — HIGH (ref 3.8–10.5)
WBC # FLD AUTO: 11.77 K/UL — HIGH (ref 3.8–10.5)

## 2024-11-08 PROCEDURE — 74018 RADEX ABDOMEN 1 VIEW: CPT | Mod: 26

## 2024-11-08 PROCEDURE — 74018 RADEX ABDOMEN 1 VIEW: CPT | Mod: 26,77

## 2024-11-08 RX ADMIN — PANTOPRAZOLE SODIUM 40 MILLIGRAM(S): 40 TABLET, DELAYED RELEASE ORAL at 13:52

## 2024-11-08 RX ADMIN — METRONIDAZOLE 100 MILLIGRAM(S): 500 TABLET ORAL at 05:28

## 2024-11-08 RX ADMIN — ENOXAPARIN SODIUM 40 MILLIGRAM(S): 30 INJECTION SUBCUTANEOUS at 13:52

## 2024-11-08 RX ADMIN — METRONIDAZOLE 100 MILLIGRAM(S): 500 TABLET ORAL at 13:53

## 2024-11-08 RX ADMIN — METRONIDAZOLE 100 MILLIGRAM(S): 500 TABLET ORAL at 21:30

## 2024-11-08 RX ADMIN — Medication 100 MILLIGRAM(S): at 21:30

## 2024-11-08 NOTE — PROGRESS NOTE ADULT - SUBJECTIVE AND OBJECTIVE BOX
I have seen and examined the patient at bedside. He received gastrograffin via NGT overnight which he did not tolerate well. First XR showed contrast still in the stomach. NGT back to LIS. He is still slightly nauseous.   Afebrile, NPO. NO BM and flatus.    Vital Signs Last 24 Hrs  T(C): 36.9 (08 Nov 2024 05:05), Max: 36.9 (08 Nov 2024 05:05)  T(F): 98.5 (08 Nov 2024 05:05), Max: 98.5 (08 Nov 2024 05:05)  HR: 64 (08 Nov 2024 05:05) (64 - 69)  BP: 155/77 (08 Nov 2024 05:05) (150/75 - 171/88)  BP(mean): --  RR: 17 (08 Nov 2024 05:05) (17 - 18)  SpO2: 96% (08 Nov 2024 05:05) (96% - 98%)    Parameters below as of 08 Nov 2024 05:05  Patient On (Oxygen Delivery Method): room air    I&O's Detail    07 Nov 2024 07:01  -  08 Nov 2024 07:00  --------------------------------------------------------  IN:  Total IN: 0 mL    OUT:    Nasogastric/Oral tube (mL): 1200 mL  Total OUT: 1200 mL    Total NET: -1200 mL      Physical Exam:  General: not in acute distress, AAOx3, resting in bed comfortably  Respiratory: normal respiratory effort, no accessory muscle use  Abdomen: NG tube in place, slightly distended, soft, non-tender  MSK: FROM x4 extremities                          12.9   11.77 )-----------( 531      ( 08 Nov 2024 06:20 )             37.4   11-08    136  |  102  |  18  ----------------------------<  107[H]  4.7   |  28  |  0.78    Ca    8.8      08 Nov 2024 06:20  Phos  2.7     11-08  Mg     2.6     11-08    TPro  7.3  /  Alb  3.0[L]  /  TBili  1.0  /  DBili  x   /  AST  99[H]  /  ALT  140[H]  /  AlkPhos  271[H]  11-06

## 2024-11-08 NOTE — PROGRESS NOTE ADULT - ATTENDING COMMENTS
As in above full notation.  Vitals non-suggestive.  Wounds clean and abdomen soft minimally tender to palpation. NGT readjusted after CXR showed loop and minimal output since. After adjustments 500cc of bilious fluid was suctioned. Pending CXR.   WBC lateral, positive blood cultures - started on ABX, pending ID consult   Surgically, stable at present.  To continue current supportive care.  Reviewed with patient in detail, Hospitalist, Surgical team as well as RN's.

## 2024-11-08 NOTE — CONSULT NOTE ADULT - SUBJECTIVE AND OBJECTIVE BOX
HPI:  74-year-old male with extensive past surgical history presented to ED with nausea and vomiting which started last night.  This patient recently discharged from [facility] after exploratory laparotomy, lysis of adhesions, small bowel resection for closed loop bowel obstruction.  Discharged 11/3.  He states he did not have bowel movement and flatus in the last two days . His appetite was low yesterday and eventually started vomiting last night. He denies fever, chills, SOB. Wound is dry, clean, intact. Labs showed elevated WBC. Vitals WNL. His abdomen is distended with mild tenderness on RLQ. CT scan today showing proximal small bowel obstruction.     PMH/PSH: PMH gastric ulcer (s/p partial gastrectomy and Bilroth II reconstruction in 1970s), VASILIY, HTN , PSH s/p appendectomy (1980s), s/p hydrocele (1990s)      (06 Nov 2024 16:19)      PAST MEDICAL & SURGICAL HISTORY:  Gastric ulcer      History of appendectomy      History of partial gastrectomy      History of Billroth II operation          No Known Allergies      Meds:  cefTRIAXone   IVPB 1000 milliGRAM(s) IV Intermittent every 24 hours  enoxaparin Injectable 40 milliGRAM(s) SubCutaneous every 24 hours  hydrALAZINE Injectable 10 milliGRAM(s) IV Push every 6 hours PRN  lactated ringers. 1000 milliLiter(s) IV Continuous <Continuous>  metroNIDAZOLE  IVPB 500 milliGRAM(s) IV Intermittent every 8 hours  ondansetron Injectable 4 milliGRAM(s) IV Push every 6 hours PRN  pantoprazole  Injectable 40 milliGRAM(s) IV Push every 24 hours      SOCIAL HISTORY:  Smoker:  YES / NO        PACK YEARS:                         WHEN QUIT?  ETOH use:  YES / NO               FREQUENCY / QUANTITY:  Ilicit Drug use:  YES / NO  Occupation:  Assisted device use (Cane / Walker):  Live with:    FAMILY HISTORY:      VITALS:  Vital Signs Last 24 Hrs  T(C): 36.9 (08 Nov 2024 13:15), Max: 36.9 (08 Nov 2024 05:05)  T(F): 98.5 (08 Nov 2024 13:15), Max: 98.5 (08 Nov 2024 05:05)  HR: 68 (08 Nov 2024 13:15) (64 - 68)  BP: 128/80 (08 Nov 2024 13:15) (128/80 - 162/81)  BP(mean): --  RR: 18 (08 Nov 2024 13:15) (17 - 18)  SpO2: 97% (08 Nov 2024 13:15) (96% - 97%)    Parameters below as of 08 Nov 2024 13:15  Patient On (Oxygen Delivery Method): room air        LABS/DIAGNOSTIC TESTS:                          12.9   11.77 )-----------( 531      ( 08 Nov 2024 06:20 )             37.4     WBC Count: 11.77 K/uL (11-08 @ 06:20)  WBC Count: 11.57 K/uL (11-07 @ 09:45)  WBC Count: 11.00 K/uL (11-06 @ 11:30)      11-08    136  |  102  |  18  ----------------------------<  107[H]  4.7   |  28  |  0.78    Ca    8.8      08 Nov 2024 06:20  Phos  2.7     11-08  Mg     2.6     11-08        Urinalysis Basic - ( 08 Nov 2024 06:20 )    Color: x / Appearance: x / SG: x / pH: x  Gluc: 107 mg/dL / Ketone: x  / Bili: x / Urobili: x   Blood: x / Protein: x / Nitrite: x   Leuk Esterase: x / RBC: x / WBC x   Sq Epi: x / Non Sq Epi: x / Bacteria: x                LACTATE:    ABG -     CULTURES:   .Blood BLOOD  11-06 @ 11:30   Growth in anaerobic bottle: Gram Negative Rods  Direct identification is available within approximately 3-5  hours either by Blood Panel Multiplexed PCR or Direct  MALDI-TOF. Details: [email or web address]  --  Blood Culture PCR      .Blood BLOOD  11-06 @ 11:20   Growth in anaerobic bottle: Gram Negative Rods  --    Growth in anaerobic bottle: Gram Negative Rods            RADIOLOGY:< from: Xray Abdomen 1 View PORTABLE -Urgent (Xray Abdomen 1 View PORTABLE -Urgent .) (11.07.24 @ 23:37) >  ACC: [id number] EXAM:  XR ABDOMEN PORTABLE URGENT 1V   ORDERED BY: DAIANA GATICA     ACC: [id number] EXAM:  XR CHEST PORTABLE ROUTINE 1V   ORDERED BY: ESTEFANIA FISHER     PROCEDURE DATE:  11/06/2024          INTERPRETATION:  INDICATION: Nasogastrictube placement. Small bowel   obstruction.    TECHNIQUE: Single portable view of the chest dated 11/6/2024 at 6:11 PM   and single portable view of the abdomen after Gastrografin was injected   into patient's nasogastric tube obtained at 11:22 PM on 11/7/2024    COMPARISON: CT scan dated 11/6/2024    FINDINGS:    The cardiac silhouette is normal in size. Lung volumes are low. There are   no focal consolidations or pleural effusions. There is a nasogastric tube   which is looped in the stomach with tip within the stomach. There are   multiple epigastric surgical clips and midline surgical staples in the   abdomen. Gastrografin opacifies the stomach and extends into the proximal   small bowel. There is distention of the proximal small bowel/jejunum as   was seen previously.    IMPRESSION: Nasogastric tube in good position with tip in the stomach.   Gastrografin opacifies the stomach and extends into the proximal small   bowel/jejunum. There is persistent distention of the jejunum.    --- End of Report ---            MALLORY GAN MD; Attending Radiologist  This document has been electronically signed. Nov 8 2024  8:35AM    < end of copied text >  ----------------------------------------------------------------------------------------------------------------------------    ACC: [id number] EXAM:  CT ABDOMEN AND PELVIS IC   ORDERED BY:  HUNTER GIRON     PROCEDURE DATE:  11/06/2024          INTERPRETATION:  CLINICAL INFORMATION: Recent small bowel obstruction.    COMPARISON: CT 10/28/2024    CONTRAST/COMPLICATIONS:  IV Contrast: Omnipaque 350  90 cc administered   10 cc discarded  Oral Contrast: NONE  Complications: None reported at time of study completion    PROCEDURE:  CT of the Abdomen and Pelvis was performed.  Sagittal and coronal reformats were performed.    FINDINGS:  LOWER CHEST: Left diaphragmatic hernia containing fat and unobstructed   segment of large bowel. Right diaphragmatic hernia containing fat.    LIVER: Few hypodense foci within the liver are too small to characterize.  BILE DUCTS: Normal caliber.  GALLBLADDER: Distended.  SPLEEN: Within normal limits.  PANCREAS: Within normal limits.  ADRENALS: Within normal limits.  KIDNEYS/URETERS: No hydronephrosis. Left renal cysts.    BLADDER: Air within the bladder.  REPRODUCTIVE ORGANS: Markedly enlarged prostate.    BOWEL: Distal gastrectomy and gastrojejunostomy. Residual stomach and   proximal jejunum are markedly dilated. Transition point at the proximal   jejunum within the left anterior abdomen (2, 72). Upstream a afferent   limb is also markedly dilated. Colonic diverticulosis.  PERITONEUM/RETROPERITONEUM: A few fluid loculations throughout the   abdomen occluding ill-defined fluid within the mesentery, possibly   postsurgical. Reference fluid collection within the right hemiabdomen   measures 5.0 x 2.2 cm (2, 81).  VESSELS: Within normal limits.  LYMPH NODES: No lymphadenopathy.  ABDOMINAL WALL: Postsurgical change.  BONES: Degenerative changes of the spine.    IMPRESSION:  *  Proximal small bowel obstruction with transition point within the left   anterior abdomen.  *  Diffusely wall thickened downstream small bowel suggests enteritis.      --- End of Report ---             IVETTE SWANN DO; Attending Radiologist  This document has been electronically signed. Nov 6 2024  3:22PM    < end of copied text >        ROS  [  ] UNABLE TO ELICIT               HPI:  74-year-old male with extensive past surgical history presented to ED with nausea and vomiting which started last night.  This patient recently discharged from [facility] after exploratory laparotomy, lysis of adhesions, small bowel resection for closed loop bowel obstruction.  Discharged 11/3.  He states he did not have bowel movement and flatus in the last two days . His appetite was low yesterday and eventually started vomiting last night. He denies fever, chills, SOB. Wound is dry, clean, intact. Labs showed elevated WBC. Vitals WNL. His abdomen is distended with mild tenderness on RLQ. CT scan today showing proximal small bowel obstruction.         History as above, asked to see this patient who was recently admitted here with SBO and is s/p partial small bowel resection and was discharged home 5 days ago but came back 2 days ago with nausea and vomiting x 1 day and developed abdominal pain here, he also has abdominal distention that he came in with. He was found to have a recurrence of SBO and some Enteritis distal to the SBO  site. He is feeling much better overall since a [place] sump was inserted and he was decompressed. He is growing out Bacteroides Fragilis in 2 of 4 bottles of blood cultures. He denies any fevers or chills , no other complaints.        PAST MEDICAL & SURGICAL HISTORY:  Gastric ulcer      History of appendectomy      History of partial gastrectomy      History of Billroth II operation          No Known Allergies      Meds:  cefTRIAXone   IVPB 1000 milliGRAM(s) IV Intermittent every 24 hours  enoxaparin Injectable 40 milliGRAM(s) SubCutaneous every 24 hours  hydrALAZINE Injectable 10 milliGRAM(s) IV Push every 6 hours PRN  lactated ringers. 1000 milliLiter(s) IV Continuous <Continuous>  metroNIDAZOLE  IVPB 500 milliGRAM(s) IV Intermittent every 8 hours  ondansetron Injectable 4 milliGRAM(s) IV Push every 6 hours PRN  pantoprazole  Injectable 40 milliGRAM(s) IV Push every 24 hours      SOCIAL HISTORY:  Smoker:  no  ETOH use:  YES , socially  Ilicit Drug use:  no    FAMILY HISTORY: not contributory      VITALS:  Vital Signs Last 24 Hrs  T(C): 36.9 (08 Nov 2024 13:15), Max: 36.9 (08 Nov 2024 05:05)  T(F): 98.5 (08 Nov 2024 13:15), Max: 98.5 (08 Nov 2024 05:05)  HR: 68 (08 Nov 2024 13:15) (64 - 68)  BP: 128/80 (08 Nov 2024 13:15) (128/80 - 162/81)  BP(mean): --  RR: 18 (08 Nov 2024 13:15) (17 - 18)  SpO2: 97% (08 Nov 2024 13:15) (96% - 97%)    Parameters below as of 08 Nov 2024 13:15  Patient On (Oxygen Delivery Method): room air        LABS/DIAGNOSTIC TESTS:                          12.9   11.77 )-----------( 531      ( 08 Nov 2024 06:20 )             37.4     WBC Count: 11.77 K/uL (11-08 @ 06:20)  WBC Count: 11.57 K/uL (11-07 @ 09:45)  WBC Count: 11.00 K/uL (11-06 @ 11:30)      11-08    136  |  102  |  18  ----------------------------<  107[H]  4.7   |  28  |  0.78    Ca    8.8      08 Nov 2024 06:20  Phos  2.7     11-08  Mg     2.6     11-08        LACTATE:    ABG -     CULTURES:   .Blood BLOOD  11-06 @ 11:30   Growth in anaerobic bottle: Gram Negative Rods  Direct identification is available within approximately 3-5  hours either by Blood Panel Multiplexed PCR or Direct  MALDI-TOF. Details: [email or web address]  --  Blood Culture PCR      .Blood BLOOD  11-06 @ 11:20   Growth in anaerobic bottle: Gram Negative Rods  --    Growth in anaerobic bottle: Gram Negative Rods            RADIOLOGY:< from: Xray Abdomen 1 View PORTABLE -Urgent (Xray Abdomen 1 View PORTABLE -Urgent .) (11.07.24 @ 23:37) >  ACC: [id number] EXAM:  XR ABDOMEN PORTABLE URGENT 1V   ORDERED BY: DAIANA GATICA     ACC: [id number] EXAM:  XR CHEST PORTABLE ROUTINE 1V   ORDERED BY: ESTEFANIA FISHER     PROCEDURE DATE:  11/06/2024          INTERPRETATION:  INDICATION: Nasogastrictube placement. Small bowel   obstruction.    TECHNIQUE: Single portable view of the chest dated 11/6/2024 at 6:11 PM   and single portable view of the abdomen after Gastrografin was injected   into patient's nasogastric tube obtained at 11:22 PM on 11/7/2024    COMPARISON: CT scan dated 11/6/2024    FINDINGS:    The cardiac silhouette is normal in size. Lung volumes are low. There are   no focal consolidations or pleural effusions. There is a nasogastric tube   which is looped in the stomach with tip within the stomach. There are   multiple epigastric surgical clips and midline surgical staples in the   abdomen. Gastrografin opacifies the stomach and extends into the proximal   small bowel. There is distention of the proximal small bowel/jejunum as   was seen previously.    IMPRESSION: Nasogastric tube in good position with tip in the stomach.   Gastrografin opacifies the stomach and extends into the proximal small   bowel/jejunum. There is persistent distention of the jejunum.    --- End of Report ---            MALLORY GAN MD; Attending Radiologist  This document has been electronically signed. Nov 8 2024  8:35AM    < end of copied text >  ----------------------------------------------------------------------------------------------------------------------------    ACC: [id number] EXAM:  CT ABDOMEN AND PELVIS IC   ORDERED BY:  HUNTER GIRON     PROCEDURE DATE:  11/06/2024          INTERPRETATION:  CLINICAL INFORMATION: Recent small bowel obstruction.    COMPARISON: CT 10/28/2024    CONTRAST/COMPLICATIONS:  IV Contrast: Omnipaque 350  90 cc administered   10 cc discarded  Oral Contrast: NONE  Complications: None reported at time of study completion    PROCEDURE:  CT of the Abdomen and Pelvis was performed.  Sagittal and coronal reformats were performed.    FINDINGS:  LOWER CHEST: Left diaphragmatic hernia containing fat and unobstructed   segment of large bowel. Right diaphragmatic hernia containing fat.    LIVER: Few hypodense foci within the liver are too small to characterize.  BILE DUCTS: Normal caliber.  GALLBLADDER: Distended.  SPLEEN: Within normal limits.  PANCREAS: Within normal limits.  ADRENALS: Within normal limits.  KIDNEYS/URETERS: No hydronephrosis. Left renal cysts.    BLADDER: Air within the bladder.  REPRODUCTIVE ORGANS: Markedly enlarged prostate.    BOWEL: Distal gastrectomy and gastrojejunostomy. Residual stomach and   proximal jejunum are markedly dilated. Transition point at the proximal   jejunum within the left anterior abdomen (2, 72). Upstream a afferent   limb is also markedly dilated. Colonic diverticulosis.  PERITONEUM/RETROPERITONEUM: A few fluid loculations throughout the   abdomen occluding ill-defined fluid within the mesentery, possibly   postsurgical. Reference fluid collection within the right hemiabdomen   measures 5.0 x 2.2 cm (2, 81).  VESSELS: Within normal limits.  LYMPH NODES: No lymphadenopathy.  ABDOMINAL WALL: Postsurgical change.  BONES: Degenerative changes of the spine.    IMPRESSION:  *  Proximal small bowel obstruction with transition point within the left   anterior abdomen.  *  Diffusely wall thickened downstream small bowel suggests enteritis.      --- End of Report ---             IVETTE SWANN DO; Attending Radiologist  This document has been electronically signed. Nov 6 2024  3:22PM    < end of copied text >        ROS  [  ] UNABLE TO ELICIT

## 2024-11-08 NOTE — PHARMACOTHERAPY INTERVENTION NOTE - COMMENTS
Culture Date/Time: 2024-11-06 11:30  BCID: -  Bacteroides fragilis  Detec    Antibiotics:  cefTRIAXone  metroNIDAZOLE    [employer] reviewed. No additional recommendation at this time.

## 2024-11-08 NOTE — CONSULT NOTE ADULT - ASSESSMENT
Bacteremia - with Bacteroides  Leukocytosis  Enteritis  SBO      Plan - Cont Rocephin 1gm iv qd  Cont Flagyl 500mgs iv q8hrs  repeat blood cultures in 24 hrs.

## 2024-11-08 NOTE — PROGRESS NOTE ADULT - ASSESSMENT
74M presenting to ED with nausea/vomiting s/p recent discharge for small bowel obstruction with exlap, small bowel resection.  CT showing proximal obstruction with transition point.     He received gastrograffin challenge last night. First XR showed contrast all in stomach. He did not tolerate well. Blood culture +    - Abd XR   - Blood Culture, ID consult    - NPO, NG tube to LIS, IVF  - Pain control  - Monitor bowel function  - Zofran PRN  - Ambulate   - Continue ABx

## 2024-11-09 LAB
ANION GAP SERPL CALC-SCNC: 7 MMOL/L — SIGNIFICANT CHANGE UP (ref 5–17)
BASOPHILS # BLD AUTO: 0.03 K/UL — SIGNIFICANT CHANGE UP (ref 0–0.2)
BASOPHILS NFR BLD AUTO: 0.3 % — SIGNIFICANT CHANGE UP (ref 0–2)
BUN SERPL-MCNC: 17 MG/DL — SIGNIFICANT CHANGE UP (ref 7–18)
CALCIUM SERPL-MCNC: 8.2 MG/DL — LOW (ref 8.4–10.5)
CHLORIDE SERPL-SCNC: 102 MMOL/L — SIGNIFICANT CHANGE UP (ref 96–108)
CO2 SERPL-SCNC: 28 MMOL/L — SIGNIFICANT CHANGE UP (ref 22–31)
CREAT SERPL-MCNC: 0.72 MG/DL — SIGNIFICANT CHANGE UP (ref 0.5–1.3)
CULTURE RESULTS: ABNORMAL
EGFR: 96 ML/MIN/1.73M2 — SIGNIFICANT CHANGE UP
EOSINOPHIL # BLD AUTO: 0.08 K/UL — SIGNIFICANT CHANGE UP (ref 0–0.5)
EOSINOPHIL NFR BLD AUTO: 0.7 % — SIGNIFICANT CHANGE UP (ref 0–6)
GLUCOSE SERPL-MCNC: 89 MG/DL — SIGNIFICANT CHANGE UP (ref 70–99)
HCT VFR BLD CALC: 35.2 % — LOW (ref 39–50)
HGB BLD-MCNC: 11.8 G/DL — LOW (ref 13–17)
IMM GRANULOCYTES NFR BLD AUTO: 1.2 % — HIGH (ref 0–0.9)
LYMPHOCYTES # BLD AUTO: 0.92 K/UL — LOW (ref 1–3.3)
LYMPHOCYTES # BLD AUTO: 8.5 % — LOW (ref 13–44)
MAGNESIUM SERPL-MCNC: 2.4 MG/DL — SIGNIFICANT CHANGE UP (ref 1.6–2.6)
MCHC RBC-ENTMCNC: 28.2 PG — SIGNIFICANT CHANGE UP (ref 27–34)
MCHC RBC-ENTMCNC: 33.5 G/DL — SIGNIFICANT CHANGE UP (ref 32–36)
MCV RBC AUTO: 84 FL — SIGNIFICANT CHANGE UP (ref 80–100)
MONOCYTES # BLD AUTO: 0.79 K/UL — SIGNIFICANT CHANGE UP (ref 0–0.9)
MONOCYTES NFR BLD AUTO: 7.3 % — SIGNIFICANT CHANGE UP (ref 2–14)
NEUTROPHILS # BLD AUTO: 8.89 K/UL — HIGH (ref 1.8–7.4)
NEUTROPHILS NFR BLD AUTO: 82 % — HIGH (ref 43–77)
NRBC # BLD: 0 /100 WBCS — SIGNIFICANT CHANGE UP (ref 0–0)
PHOSPHATE SERPL-MCNC: 2 MG/DL — LOW (ref 2.5–4.5)
PLATELET # BLD AUTO: 510 K/UL — HIGH (ref 150–400)
POTASSIUM SERPL-MCNC: 3.7 MMOL/L — SIGNIFICANT CHANGE UP (ref 3.5–5.3)
POTASSIUM SERPL-SCNC: 3.7 MMOL/L — SIGNIFICANT CHANGE UP (ref 3.5–5.3)
RBC # BLD: 4.19 M/UL — LOW (ref 4.2–5.8)
RBC # FLD: 14.8 % — HIGH (ref 10.3–14.5)
SODIUM SERPL-SCNC: 137 MMOL/L — SIGNIFICANT CHANGE UP (ref 135–145)
SPECIMEN SOURCE: SIGNIFICANT CHANGE UP
WBC # BLD: 10.84 K/UL — HIGH (ref 3.8–10.5)
WBC # FLD AUTO: 10.84 K/UL — HIGH (ref 3.8–10.5)

## 2024-11-09 RX ORDER — POTASSIUM PHOSPHATE, MONOBASIC POTASSIUM PHOSPHATE, DIBASIC INJECTION, 236; 224 MG/ML; MG/ML
15 SOLUTION, CONCENTRATE INTRAVENOUS ONCE
Refills: 0 | Status: COMPLETED | OUTPATIENT
Start: 2024-11-09 | End: 2024-11-09

## 2024-11-09 RX ADMIN — METRONIDAZOLE 100 MILLIGRAM(S): 500 TABLET ORAL at 21:06

## 2024-11-09 RX ADMIN — POTASSIUM PHOSPHATE, MONOBASIC POTASSIUM PHOSPHATE, DIBASIC INJECTION, 62.5 MILLIMOLE(S): 236; 224 SOLUTION, CONCENTRATE INTRAVENOUS at 15:37

## 2024-11-09 RX ADMIN — Medication 100 MILLIGRAM(S): at 22:33

## 2024-11-09 RX ADMIN — METRONIDAZOLE 100 MILLIGRAM(S): 500 TABLET ORAL at 05:30

## 2024-11-09 RX ADMIN — ENOXAPARIN SODIUM 40 MILLIGRAM(S): 30 INJECTION SUBCUTANEOUS at 13:38

## 2024-11-09 RX ADMIN — METRONIDAZOLE 100 MILLIGRAM(S): 500 TABLET ORAL at 13:39

## 2024-11-09 RX ADMIN — PANTOPRAZOLE SODIUM 40 MILLIGRAM(S): 40 TABLET, DELAYED RELEASE ORAL at 13:38

## 2024-11-09 NOTE — PROGRESS NOTE ADULT - SUBJECTIVE AND OBJECTIVE BOX
74y Male is under our care for     MEDS:  cefTRIAXone   IVPB 1000 milliGRAM(s) IV Intermittent every 24 hours  metroNIDAZOLE  IVPB 500 milliGRAM(s) IV Intermittent every 8 hours    ALLERGIES: Allergies    No Known Allergies    Intolerances    REVIEW OF SYSTEMS:  [  ] Not able to elicit  General:	  Chest:	  GI:	  :  Skin:	  Musculoskeletal:	  Neuro:	    VITALS:  Vital Signs Last 24 Hrs  T(C): 36.7 (09 Nov 2024 06:12), Max: 36.9 (08 Nov 2024 13:15)  T(F): 98 (09 Nov 2024 06:12), Max: 98.5 (08 Nov 2024 13:15)  HR: 60 (09 Nov 2024 06:12) (60 - 68)  BP: 169/85 (09 Nov 2024 06:12) (128/80 - 169/85)  BP(mean): 105 (08 Nov 2024 21:04) (103 - 105)  RR: 18 (09 Nov 2024 06:12) (18 - 18)  SpO2: 97% (09 Nov 2024 06:12) (96% - 97%)    Parameters below as of 09 Nov 2024 06:12  Patient On (Oxygen Delivery Method): room air    PHYSICAL EXAM:  HEENT:  Neck:  Respiratory:  Cardiovascular:  Gastrointestinal:  :  Extremities:  Skin:  Ortho:  Neuro:    LABS/DIAGNOSTIC TESTS:                        11.8   10.84 )-----------( 510      ( 09 Nov 2024 05:17 )             35.2     WBC Count: 10.84 K/uL (11-09 @ 05:17)  WBC Count: 11.77 K/uL (11-08 @ 06:20)  WBC Count: 11.57 K/uL (11-07 @ 09:45)  WBC Count: 11.00 K/uL (11-06 @ 11:30)    11-09    137  |  102  |  17  ----------------------------<  89  3.7   |  28  |  0.72    Ca    8.2[L]      09 Nov 2024 05:17  Phos  2.0     11-09  Mg     2.4     11-09    CULTURES:   .Blood BLOOD  11-06 @ 11:30   Growth in anaerobic bottle: Bacteroides fragilis "Susceptibilities not  performed"  Direct identification is available within approximately 3-5  hours either by Blood Panel Multiplexed PCR or Direct  MALDI-TOF. Details: [email or web address]  --  Blood Culture PCR    .Blood BLOOD  11-06 @ 11:20   Growth in anaerobic bottle: Bacteroides fragilis  "Susceptibilities not performed"  --    Growth in anaerobic bottle: Gram Negative Rods    Clean Catch Clean Catch (Midstream)  10-28 @ 12:30   <10,000 CFU/mL Normal Urogenital Merlene  --  --    RADIOLOGY:  no new studies 74y Male lying in bed and in no apparent distress. His NGT remains in place to S.  Denies nausea, vomiting, diarrhea or abdominal pain. Passing flatus. No fever or chills. Repeat blood cultures pending results.     MEDS:  cefTRIAXone   IVPB 1000 milliGRAM(s) IV Intermittent every 24 hours  metroNIDAZOLE  IVPB 500 milliGRAM(s) IV Intermittent every 8 hours    ALLERGIES: Allergies    No Known Allergies    Intolerances    REVIEW OF SYSTEMS:  [  ] Not able to elicit  General: no fevers no malaise  Chest: no cough no sob  GI: no nvd  : no urinary sxs   Skin: no rashes  Musculoskeletal: no trauma no LBP  Neuro: no ha's no dizziness     VITALS:  Vital Signs Last 24 Hrs  T(C): 36.7 (09 Nov 2024 06:12), Max: 36.9 (08 Nov 2024 13:15)  T(F): 98 (09 Nov 2024 06:12), Max: 98.5 (08 Nov 2024 13:15)  HR: 60 (09 Nov 2024 06:12) (60 - 68)  BP: 169/85 (09 Nov 2024 06:12) (128/80 - 169/85)  BP(mean): 105 (08 Nov 2024 21:04) (103 - 105)  RR: 18 (09 Nov 2024 06:12) (18 - 18)  SpO2: 97% (09 Nov 2024 06:12) (96% - 97%)    Parameters below as of 09 Nov 2024 06:12  Patient On (Oxygen Delivery Method): room air    PHYSICAL EXAM:  HEENT: normocephalic, conjunctivae and sclerae clear; moist mucous membranes  Neck: supple no LN's   Respiratory: lungs clear no rales  Cardiovascular: S1 S2 reg no murmurs  Gastrointestinal: Hypoactive BS with soft, mildly distended abdomen; nontender  Extremities: no edema  Skin: no rashes  Ortho: no erythema or joint swelling  Neuro: AAO x 3    LABS/DIAGNOSTIC TESTS:                        11.8   10.84 )-----------( 510      ( 09 Nov 2024 05:17 )             35.2     WBC Count: 10.84 K/uL (11-09 @ 05:17)  WBC Count: 11.77 K/uL (11-08 @ 06:20)  WBC Count: 11.57 K/uL (11-07 @ 09:45)  WBC Count: 11.00 K/uL (11-06 @ 11:30)    11-09    137  |  102  |  17  ----------------------------<  89  3.7   |  28  |  0.72    Ca    8.2[L]      09 Nov 2024 05:17  Phos  2.0     11-09  Mg     2.4     11-09    CULTURES:   .Blood BLOOD  11-06 @ 11:30   Growth in anaerobic bottle: Bacteroides fragilis "Susceptibilities not  performed"  Direct identification is available within approximately 3-5  hours either by Blood Panel Multiplexed PCR or Direct  MALDI-TOF. Details: [email or web address]  --  Blood Culture PCR    .Blood BLOOD  11-06 @ 11:20   Growth in anaerobic bottle: Bacteroides fragilis  "Susceptibilities not performed"  --    Growth in anaerobic bottle: Gram Negative Rods    Clean Catch Clean Catch (Midstream)  10-28 @ 12:30   <10,000 CFU/mL Normal Urogenital Merlene  --  --    RADIOLOGY:  no new studies 74y Male lying in bed and in no apparent distress. His NGT remains in place to .  Denies nausea, vomiting, diarrhea or abdominal pain. Passing flatus. No fever or chills. Repeat blood cultures pending results.     MEDS:  cefTRIAXone   IVPB 1000 milliGRAM(s) IV Intermittent every 24 hours  metroNIDAZOLE  IVPB 500 milliGRAM(s) IV Intermittent every 8 hours    ALLERGIES: Allergies    No Known Allergies    Intolerances    REVIEW OF SYSTEMS:  [  ] Not able to elicit  General: no fevers no malaise  Chest: no cough no sob  GI: no nvd  : no urinary sxs   Skin: no rashes  Musculoskeletal: no trauma no LBP  Neuro: no ha's no dizziness     VITALS:  Vital Signs Last 24 Hrs  T(C): 36.7 (09 Nov 2024 06:12), Max: 36.9 (08 Nov 2024 13:15)  T(F): 98 (09 Nov 2024 06:12), Max: 98.5 (08 Nov 2024 13:15)  HR: 60 (09 Nov 2024 06:12) (60 - 68)  BP: 169/85 (09 Nov 2024 06:12) (128/80 - 169/85)  BP(mean): 105 (08 Nov 2024 21:04) (103 - 105)  RR: 18 (09 Nov 2024 06:12) (18 - 18)  SpO2: 97% (09 Nov 2024 06:12) (96% - 97%)    Parameters below as of 09 Nov 2024 06:12  Patient On (Oxygen Delivery Method): room air    PHYSICAL EXAM:  HEENT: normocephalic, conjunctivae and sclerae clear; moist mucous membranes  Neck: supple no LN's   Respiratory: lungs clear no rales  Cardiovascular: S1 S2 reg no murmurs  Gastrointestinal: Hypoactive BS with soft, mildly distended abdomen; nontender  Extremities: no edema  Skin: no rashes  Ortho: no erythema or joint swelling  Neuro: AAO x 3    LABS/DIAGNOSTIC TESTS:                        11.8   10.84 )-----------( 510      ( 09 Nov 2024 05:17 )             35.2     WBC Count: 10.84 K/uL (11-09 @ 05:17)  WBC Count: 11.77 K/uL (11-08 @ 06:20)  WBC Count: 11.57 K/uL (11-07 @ 09:45)  WBC Count: 11.00 K/uL (11-06 @ 11:30)    11-09    137  |  102  |  17  ----------------------------<  89  3.7   |  28  |  0.72    Ca    8.2[L]      09 Nov 2024 05:17  Phos  2.0     11-09  Mg     2.4     11-09    CULTURES:   .Blood BLOOD  11-06 @ 11:30   Growth in anaerobic bottle: Bacteroides fragilis "Susceptibilities not  performed"  Direct identification is available within approximately 3-5  hours either by Blood Panel Multiplexed PCR or Direct  MALDI-TOF. Details: [email or web address]  --  Blood Culture PCR    .Blood BLOOD  11-06 @ 11:20   Growth in anaerobic bottle: Bacteroides fragilis  "Susceptibilities not performed"  --    Growth in anaerobic bottle: Gram Negative Rods        RADIOLOGY:  no new studies

## 2024-11-09 NOTE — PROGRESS NOTE ADULT - SUBJECTIVE AND OBJECTIVE BOX
INTERVAL HPI/OVERNIGHT EVENTS:  Pt resting comfortably. No acute complaints.   Feeling better.  NGT to LWS.  Passed minimal flatus.  -BM.  Denies N/V, abd pain.    MEDICATIONS  (STANDING):  cefTRIAXone   IVPB 1000 milliGRAM(s) IV Intermittent every 24 hours  enoxaparin Injectable 40 milliGRAM(s) SubCutaneous every 24 hours  lactated ringers. 1000 milliLiter(s) (100 mL/Hr) IV Continuous <Continuous>  metroNIDAZOLE  IVPB 500 milliGRAM(s) IV Intermittent every 8 hours  pantoprazole  Injectable 40 milliGRAM(s) IV Push every 24 hours    MEDICATIONS  (PRN):  hydrALAZINE Injectable 10 milliGRAM(s) IV Push every 6 hours PRN systolic BP > 160  ondansetron Injectable 4 milliGRAM(s) IV Push every 6 hours PRN Nausea    Vital Signs Last 24 Hrs  T(C): 36.7 (09 Nov 2024 06:12), Max: 36.9 (08 Nov 2024 13:15)  T(F): 98 (09 Nov 2024 06:12), Max: 98.5 (08 Nov 2024 13:15)  HR: 60 (09 Nov 2024 06:12) (60 - 68)  BP: 169/85 (09 Nov 2024 06:12) (128/80 - 169/85)  BP(mean): 105 (08 Nov 2024 21:04) (103 - 105)  RR: 18 (09 Nov 2024 06:12) (18 - 18)  SpO2: 97% (09 Nov 2024 06:12) (96% - 97%)    Parameters below as of 09 Nov 2024 06:12  Patient On (Oxygen Delivery Method): room air    Physical:  General: A&Ox3. NAD.  Abdomen: Soft mildly distended, nontender. Midline wound clean, dry. Staples intact    I&O's Detail    08 Nov 2024 07:01  -  09 Nov 2024 07:00  --------------------------------------------------------  IN:  Total IN: 0 mL    OUT:    Nasogastric/Oral tube (mL): 1000 mL  Total OUT: 1000 mL    Total NET: -1000 mL    LABS:                        11.8   10.84 )-----------( 510      ( 09 Nov 2024 05:17 )             35.2             11-09    137  |  102  |  17  ----------------------------<  89  3.7   |  28  |  0.72    Ca    8.2[L]      09 Nov 2024 05:17  Phos  2.0     11-09  Mg     2.4     11-09

## 2024-11-09 NOTE — PROGRESS NOTE ADULT - ASSESSMENT
74y.o. Male with recurrent SBO, recent, ex-lap, extensive SHANON, SBR    -Keep NGT to LWS  -Ambulation encouraged  -Pain control prn  -DVT ppx  -con't abx    HTN  -IV control while NPO 74y.o. Male with recurrent SBO, recent, ex-lap, extensive SHANON, SBR    -Keep NGT to LWS  -Ambulation encouraged  -Pain control prn  -DVT ppx  -con't abx    HTN  -IV control while NPO    Hypophosphatemia  -IV repletion

## 2024-11-09 NOTE — PROGRESS NOTE ADULT - ASSESSMENT
Bacteremia - with Bacteroides  Leukocytosis  Enteritis  SBO    Plan -   ·	Cont Rocephin 1gm iv qd  ·	Cont Flagyl 500mgs iv q8hrs  ·	repeat blood cultures today Bacteremia - with Bacteroides  Leukocytosis  Enteritis  SBO    Plan -   ·	Cont Rocephin 1gm iv qd  ·	Cont Flagyl 500mgs iv q8hrs  ·	repeat blood cultures awaiting results

## 2024-11-10 LAB
ANION GAP SERPL CALC-SCNC: 9 MMOL/L — SIGNIFICANT CHANGE UP (ref 5–17)
BASOPHILS # BLD AUTO: 0.04 K/UL — SIGNIFICANT CHANGE UP (ref 0–0.2)
BASOPHILS NFR BLD AUTO: 0.4 % — SIGNIFICANT CHANGE UP (ref 0–2)
BUN SERPL-MCNC: 13 MG/DL — SIGNIFICANT CHANGE UP (ref 7–18)
CALCIUM SERPL-MCNC: 8.3 MG/DL — LOW (ref 8.4–10.5)
CHLORIDE SERPL-SCNC: 104 MMOL/L — SIGNIFICANT CHANGE UP (ref 96–108)
CO2 SERPL-SCNC: 26 MMOL/L — SIGNIFICANT CHANGE UP (ref 22–31)
CREAT SERPL-MCNC: 0.76 MG/DL — SIGNIFICANT CHANGE UP (ref 0.5–1.3)
EGFR: 94 ML/MIN/1.73M2 — SIGNIFICANT CHANGE UP
EOSINOPHIL # BLD AUTO: 0.11 K/UL — SIGNIFICANT CHANGE UP (ref 0–0.5)
EOSINOPHIL NFR BLD AUTO: 1.1 % — SIGNIFICANT CHANGE UP (ref 0–6)
GLUCOSE BLDC GLUCOMTR-MCNC: 77 MG/DL — SIGNIFICANT CHANGE UP (ref 70–99)
GLUCOSE SERPL-MCNC: 81 MG/DL — SIGNIFICANT CHANGE UP (ref 70–99)
HCT VFR BLD CALC: 36 % — LOW (ref 39–50)
HGB BLD-MCNC: 12.2 G/DL — LOW (ref 13–17)
IMM GRANULOCYTES NFR BLD AUTO: 0.9 % — SIGNIFICANT CHANGE UP (ref 0–0.9)
LYMPHOCYTES # BLD AUTO: 0.77 K/UL — LOW (ref 1–3.3)
LYMPHOCYTES # BLD AUTO: 8 % — LOW (ref 13–44)
MAGNESIUM SERPL-MCNC: 2.1 MG/DL — SIGNIFICANT CHANGE UP (ref 1.6–2.6)
MCHC RBC-ENTMCNC: 28.7 PG — SIGNIFICANT CHANGE UP (ref 27–34)
MCHC RBC-ENTMCNC: 33.9 G/DL — SIGNIFICANT CHANGE UP (ref 32–36)
MCV RBC AUTO: 84.7 FL — SIGNIFICANT CHANGE UP (ref 80–100)
MONOCYTES # BLD AUTO: 0.64 K/UL — SIGNIFICANT CHANGE UP (ref 0–0.9)
MONOCYTES NFR BLD AUTO: 6.7 % — SIGNIFICANT CHANGE UP (ref 2–14)
NEUTROPHILS # BLD AUTO: 7.97 K/UL — HIGH (ref 1.8–7.4)
NEUTROPHILS NFR BLD AUTO: 82.9 % — HIGH (ref 43–77)
NRBC # BLD: 0 /100 WBCS — SIGNIFICANT CHANGE UP (ref 0–0)
PHOSPHATE SERPL-MCNC: 2.4 MG/DL — LOW (ref 2.5–4.5)
PLATELET # BLD AUTO: 593 K/UL — HIGH (ref 150–400)
POTASSIUM SERPL-MCNC: 4.7 MMOL/L — SIGNIFICANT CHANGE UP (ref 3.5–5.3)
POTASSIUM SERPL-SCNC: 4.7 MMOL/L — SIGNIFICANT CHANGE UP (ref 3.5–5.3)
RBC # BLD: 4.25 M/UL — SIGNIFICANT CHANGE UP (ref 4.2–5.8)
RBC # FLD: 14.9 % — HIGH (ref 10.3–14.5)
SODIUM SERPL-SCNC: 139 MMOL/L — SIGNIFICANT CHANGE UP (ref 135–145)
WBC # BLD: 9.62 K/UL — SIGNIFICANT CHANGE UP (ref 3.8–10.5)
WBC # FLD AUTO: 9.62 K/UL — SIGNIFICANT CHANGE UP (ref 3.8–10.5)

## 2024-11-10 RX ADMIN — METRONIDAZOLE 100 MILLIGRAM(S): 500 TABLET ORAL at 12:49

## 2024-11-10 RX ADMIN — METRONIDAZOLE 100 MILLIGRAM(S): 500 TABLET ORAL at 21:14

## 2024-11-10 RX ADMIN — METRONIDAZOLE 100 MILLIGRAM(S): 500 TABLET ORAL at 05:34

## 2024-11-10 RX ADMIN — ENOXAPARIN SODIUM 40 MILLIGRAM(S): 30 INJECTION SUBCUTANEOUS at 12:49

## 2024-11-10 RX ADMIN — Medication 100 MILLIGRAM(S): at 22:20

## 2024-11-10 RX ADMIN — PANTOPRAZOLE SODIUM 40 MILLIGRAM(S): 40 TABLET, DELAYED RELEASE ORAL at 12:48

## 2024-11-10 NOTE — PROGRESS NOTE ADULT - SUBJECTIVE AND OBJECTIVE BOX
INTERVAL HPI/OVERNIGHT EVENTS: Pt seen and examined at bedside. Patient reports no BM, might has passed some gas yesterday but unsure. Patient has been ambulating around the unit without issues. No other acute complaints.     Vital Signs Last 24 Hrs  T(C): 36.7 (10 Nov 2024 05:33), Max: 36.8 (09 Nov 2024 21:12)  T(F): 98.1 (10 Nov 2024 05:33), Max: 98.3 (09 Nov 2024 21:12)  HR: 64 (10 Nov 2024 05:33) (63 - 64)  BP: 161/86 (10 Nov 2024 05:33) (155/74 - 161/86)  BP(mean): 105 (09 Nov 2024 21:12) (105 - 105)  RR: 18 (10 Nov 2024 05:33) (18 - 18)  SpO2: 96% (10 Nov 2024 05:33) (96% - 97%)    Parameters below as of 10 Nov 2024 05:33  Patient On (Oxygen Delivery Method): room air      I&O's Detail    09 Nov 2024 07:01  -  10 Nov 2024 07:00  --------------------------------------------------------  IN:  Total IN: 0 mL    OUT:    Nasogastric/Oral tube (mL): 620 mL  Total OUT: 620 mL    Total NET: -620 mL          Medications:  cefTRIAXone   IVPB 1000 milliGRAM(s) IV Intermittent every 24 hours  metroNIDAZOLE  IVPB 500 milliGRAM(s) IV Intermittent every 8 hours  pantoprazole  Injectable 40 milliGRAM(s) IV Push every 24 hours      Physical Exam:  General: AAOx3, No acute distress  HEENT: NC/AT, trachea midline  Respiratory: Nonlabored breathing, equal chest rise b/l   Abdomen: soft,  nondistended, nontender, no rebound tenderness, no guarding, no palpable masses; midline wound with staples in place without skin changes noted  Lines/drains/tubes: ngt in place     Drains/Tubes:     11-09-24 @ 07:01  -  11-10-24 @ 07:00  --------------------------------------------------------  IN: 0 mL / OUT: 620 mL / NET: -620 mL        Labs:                        12.2   9.62  )-----------( 593      ( 10 Nov 2024 06:35 )             36.0     11-10    139  |  104  |  13  ----------------------------<  81  4.7   |  26  |  0.76    Ca    8.3[L]      10 Nov 2024 06:35  Phos  2.4     11-10  Mg     2.1     11-10

## 2024-11-10 NOTE — PROGRESS NOTE ADULT - ASSESSMENT
74 year old male with recurrent SBO with recent ex-lap, extensive SHANON, SBR on 10/28.    -NGT to LWS  -Ambulation as tolerated/oobtc  -Pain control prn  -DVT ppx  -con't abx  -replete K/phos prn  -IV HTN control while NPO    74 year old male with recurrent SBO with recent ex-lap, extensive SHANON, SBR on 10/28.    -NGT to LWS  -Ambulation as tolerated/oobtc  -Pain control prn  -DVT ppx  -con't abx  -replete electrolytes prn  -IV HTN control while NPO

## 2024-11-11 LAB
ANION GAP SERPL CALC-SCNC: 6 MMOL/L — SIGNIFICANT CHANGE UP (ref 5–17)
BASOPHILS # BLD AUTO: 0.03 K/UL — SIGNIFICANT CHANGE UP (ref 0–0.2)
BASOPHILS NFR BLD AUTO: 0.4 % — SIGNIFICANT CHANGE UP (ref 0–2)
BUN SERPL-MCNC: 9 MG/DL — SIGNIFICANT CHANGE UP (ref 7–18)
CALCIUM SERPL-MCNC: 8.4 MG/DL — SIGNIFICANT CHANGE UP (ref 8.4–10.5)
CHLORIDE SERPL-SCNC: 104 MMOL/L — SIGNIFICANT CHANGE UP (ref 96–108)
CO2 SERPL-SCNC: 29 MMOL/L — SIGNIFICANT CHANGE UP (ref 22–31)
CREAT SERPL-MCNC: 0.74 MG/DL — SIGNIFICANT CHANGE UP (ref 0.5–1.3)
EGFR: 95 ML/MIN/1.73M2 — SIGNIFICANT CHANGE UP
EOSINOPHIL # BLD AUTO: 0.15 K/UL — SIGNIFICANT CHANGE UP (ref 0–0.5)
EOSINOPHIL NFR BLD AUTO: 2.2 % — SIGNIFICANT CHANGE UP (ref 0–6)
GLUCOSE SERPL-MCNC: 146 MG/DL — HIGH (ref 70–99)
HCT VFR BLD CALC: 34.8 % — LOW (ref 39–50)
HGB BLD-MCNC: 11.6 G/DL — LOW (ref 13–17)
IMM GRANULOCYTES NFR BLD AUTO: 1.6 % — HIGH (ref 0–0.9)
LYMPHOCYTES # BLD AUTO: 0.71 K/UL — LOW (ref 1–3.3)
LYMPHOCYTES # BLD AUTO: 10.5 % — LOW (ref 13–44)
MAGNESIUM SERPL-MCNC: 2.2 MG/DL — SIGNIFICANT CHANGE UP (ref 1.6–2.6)
MCHC RBC-ENTMCNC: 27.6 PG — SIGNIFICANT CHANGE UP (ref 27–34)
MCHC RBC-ENTMCNC: 33.3 G/DL — SIGNIFICANT CHANGE UP (ref 32–36)
MCV RBC AUTO: 82.7 FL — SIGNIFICANT CHANGE UP (ref 80–100)
MONOCYTES # BLD AUTO: 0.66 K/UL — SIGNIFICANT CHANGE UP (ref 0–0.9)
MONOCYTES NFR BLD AUTO: 9.8 % — SIGNIFICANT CHANGE UP (ref 2–14)
NEUTROPHILS # BLD AUTO: 5.09 K/UL — SIGNIFICANT CHANGE UP (ref 1.8–7.4)
NEUTROPHILS NFR BLD AUTO: 75.5 % — SIGNIFICANT CHANGE UP (ref 43–77)
NRBC # BLD: 0 /100 WBCS — SIGNIFICANT CHANGE UP (ref 0–0)
PHOSPHATE SERPL-MCNC: 2.5 MG/DL — SIGNIFICANT CHANGE UP (ref 2.5–4.5)
PLATELET # BLD AUTO: 608 K/UL — HIGH (ref 150–400)
POTASSIUM SERPL-MCNC: 4.3 MMOL/L — SIGNIFICANT CHANGE UP (ref 3.5–5.3)
POTASSIUM SERPL-SCNC: 4.3 MMOL/L — SIGNIFICANT CHANGE UP (ref 3.5–5.3)
RBC # BLD: 4.21 M/UL — SIGNIFICANT CHANGE UP (ref 4.2–5.8)
RBC # FLD: 14.8 % — HIGH (ref 10.3–14.5)
SODIUM SERPL-SCNC: 139 MMOL/L — SIGNIFICANT CHANGE UP (ref 135–145)
WBC # BLD: 6.75 K/UL — SIGNIFICANT CHANGE UP (ref 3.8–10.5)
WBC # FLD AUTO: 6.75 K/UL — SIGNIFICANT CHANGE UP (ref 3.8–10.5)

## 2024-11-11 PROCEDURE — 74177 CT ABD & PELVIS W/CONTRAST: CPT | Mod: 26

## 2024-11-11 PROCEDURE — 74018 RADEX ABDOMEN 1 VIEW: CPT | Mod: 26

## 2024-11-11 RX ORDER — ACETAMINOPHEN 500MG 500 MG/1
1000 TABLET, COATED ORAL EVERY 6 HOURS
Refills: 0 | Status: COMPLETED | OUTPATIENT
Start: 2024-11-11 | End: 2024-11-12

## 2024-11-11 RX ADMIN — PANTOPRAZOLE SODIUM 40 MILLIGRAM(S): 40 TABLET, DELAYED RELEASE ORAL at 15:02

## 2024-11-11 RX ADMIN — METRONIDAZOLE 100 MILLIGRAM(S): 500 TABLET ORAL at 15:02

## 2024-11-11 RX ADMIN — ACETAMINOPHEN 500MG 400 MILLIGRAM(S): 500 TABLET, COATED ORAL at 18:05

## 2024-11-11 RX ADMIN — ENOXAPARIN SODIUM 40 MILLIGRAM(S): 30 INJECTION SUBCUTANEOUS at 15:03

## 2024-11-11 RX ADMIN — METRONIDAZOLE 100 MILLIGRAM(S): 500 TABLET ORAL at 21:29

## 2024-11-11 RX ADMIN — ACETAMINOPHEN 500MG 1000 MILLIGRAM(S): 500 TABLET, COATED ORAL at 23:22

## 2024-11-11 RX ADMIN — Medication 100 MILLIGRAM(S): at 21:29

## 2024-11-11 RX ADMIN — ACETAMINOPHEN 500MG 1000 MILLIGRAM(S): 500 TABLET, COATED ORAL at 19:00

## 2024-11-11 RX ADMIN — ACETAMINOPHEN 500MG 400 MILLIGRAM(S): 500 TABLET, COATED ORAL at 23:22

## 2024-11-11 RX ADMIN — METRONIDAZOLE 100 MILLIGRAM(S): 500 TABLET ORAL at 05:36

## 2024-11-11 NOTE — PROGRESS NOTE ADULT - ASSESSMENT
A/P:   74M  s/p exlap lysis of adhesions, small bowel resection readmitted with recurrent SBO.  -NPO, NGT  -Gastrograffin challenge today  -ambulate, OOBTC, IS  -pain control prn  -lovenox  -CTS/Flagyl, f/u blood cultures, f/u ID recs

## 2024-11-11 NOTE — PROGRESS NOTE ADULT - ASSESSMENT
Bacteremia - with Bacteroides  Leukocytosis  Enteritis  SBO    Plan -   ·	Cont Rocephin 1gm iv qd  ·	Cont Flagyl 500mgs iv q8hrs  ·	repeat blood cultures negative to date  ·	gastrograffin challenge today Bacteremia - with Bacteroides  Leukocytosis  Enteritis  SBO    Plan -   ·	Cont Rocephin 1gm iv qd  ·	Cont Flagyl 500mgs iv q8hrs  ·	repeat blood cultures negative to date  ·	Gastrografin challenge today  ·	once ready for DC home will switch antibiotics to Augmentin 875mgs po BID till 11/19/24.

## 2024-11-11 NOTE — PROGRESS NOTE ADULT - SUBJECTIVE AND OBJECTIVE BOX
Seen and examined at the bedside. s/p exlap lysis of adhesions, small bowel resection readmitted with recurrent SBO.  NG remains in place, on suction, 600cc bilious over past 24 hours. No bowel function yet.  Afebrile, VSS.  ambulating. continues on CTX, Flagyl for +blood cultures, repeat cultures negative, ID following.     Vital Signs Last 24 Hrs  T(C): 36.6 (11 Nov 2024 06:31), Max: 37.1 (10 Nov 2024 20:09)  T(F): 97.8 (11 Nov 2024 06:31), Max: 98.8 (10 Nov 2024 20:09)  HR: 60 (11 Nov 2024 06:31) (60 - 71)  BP: 153/77 (11 Nov 2024 06:31) (149/84 - 153/77)  BP(mean): 102 (11 Nov 2024 06:31) (102 - 105)  RR: 18 (11 Nov 2024 06:31) (18 - 18)  SpO2: 97% (11 Nov 2024 06:31) (97% - 99%)    Parameters below as of 11 Nov 2024 06:31  Patient On (Oxygen Delivery Method): room air    Physical:   GA: AAOx3, NAD, NG in place, bilious output  CVS: RRR  Pulm: nonlabored  Abd: soft, nondistended, minimal incisional tenderness.                           11.6   6.75  )-----------( 608      ( 11 Nov 2024 06:13 )             34.8   11-11    139  |  104  |  9   ----------------------------<  146[H]  4.3   |  29  |  0.74    Ca    8.4      11 Nov 2024 06:13  Phos  2.5     11-11  Mg     2.2     11-11

## 2024-11-11 NOTE — PROGRESS NOTE ADULT - SUBJECTIVE AND OBJECTIVE BOX
74y Male is under our care for     MEDS:  cefTRIAXone   IVPB 1000 milliGRAM(s) IV Intermittent every 24 hours  metroNIDAZOLE  IVPB 500 milliGRAM(s) IV Intermittent every 8 hours    ALLERGIES: Allergies    No Known Allergies    Intolerances    REVIEW OF SYSTEMS:  [  ] Not able to elicit  General:	  Chest:	  GI:	  :  Skin:	  Musculoskeletal:	  Neuro:	    VITALS:  Vital Signs Last 24 Hrs  T(C): 36.6 (11 Nov 2024 06:31), Max: 37.1 (10 Nov 2024 20:09)  T(F): 97.8 (11 Nov 2024 06:31), Max: 98.8 (10 Nov 2024 20:09)  HR: 60 (11 Nov 2024 06:31) (60 - 71)  BP: 153/77 (11 Nov 2024 06:31) (149/84 - 153/77)  BP(mean): 102 (11 Nov 2024 06:31) (102 - 105)  RR: 18 (11 Nov 2024 06:31) (18 - 18)  SpO2: 97% (11 Nov 2024 06:31) (97% - 99%)    Parameters below as of 11 Nov 2024 06:31  Patient On (Oxygen Delivery Method): room air    PHYSICAL EXAM:  HEENT:  Neck:  Respiratory:  Cardiovascular:  Gastrointestinal:  :  Extremities:  Skin:  Ortho:  Neuro:    LABS/DIAGNOSTIC TESTS:                        11.6   6.75  )-----------( 608      ( 11 Nov 2024 06:13 )             34.8     WBC Count: 6.75 K/uL (11-11 @ 06:13)  WBC Count: 9.62 K/uL (11-10 @ 06:35)  WBC Count: 10.84 K/uL (11-09 @ 05:17)  WBC Count: 11.77 K/uL (11-08 @ 06:20)  WBC Count: 11.57 K/uL (11-07 @ 09:45)    11-11    139  |  104  |  9   ----------------------------<  146[H]  4.3   |  29  |  0.74    Ca    8.4      11 Nov 2024 06:13  Phos  2.5     11-11  Mg     2.2     11-11    CULTURES:   .Blood BLOOD  11-08 @ 10:30   No growth at 48 Hours  --  --    .Blood BLOOD  11-06 @ 11:30   Growth in anaerobic bottle: Bacteroides fragilis "Susceptibilities not  performed"  Direct identification is available within approximately 3-5  hours either by Blood Panel Multiplexed PCR or Direct  MALDI-TOF. Details: [email or web address]  --  Blood Culture PCR    .Blood BLOOD  11-06 @ 11:20   Growth in anaerobic bottle: Bacteroides fragilis  "Susceptibilities not performed"  --    Growth in anaerobic bottle: Gram Negative Rods    Clean Catch Clean Catch (Midstream)  10-28 @ 12:30   <10,000 CFU/mL Normal Urogenital Merlene  --  --    RADIOLOGY:  no new studies 74y Male lying in bed and in no apparent distress. NGT remains in place with bilious output. No fevers or chills. Scheduled for gastrograffin challenge today. Repeat blood cultures are negative.     MEDS:  cefTRIAXone   IVPB 1000 milliGRAM(s) IV Intermittent every 24 hours  metroNIDAZOLE  IVPB 500 milliGRAM(s) IV Intermittent every 8 hours    ALLERGIES: Allergies    No Known Allergies    Intolerances    REVIEW OF SYSTEMS:  [  ] Not able to elicit  General: no fevers no malaise  Chest: no cough no sob  GI: no nvd  : no urinary sxs   Skin: no rashes  Musculoskeletal: no trauma no LBP  Neuro: no ha's no dizziness     VITALS:  Vital Signs Last 24 Hrs  T(C): 36.6 (11 Nov 2024 06:31), Max: 37.1 (10 Nov 2024 20:09)  T(F): 97.8 (11 Nov 2024 06:31), Max: 98.8 (10 Nov 2024 20:09)  HR: 60 (11 Nov 2024 06:31) (60 - 71)  BP: 153/77 (11 Nov 2024 06:31) (149/84 - 153/77)  BP(mean): 102 (11 Nov 2024 06:31) (102 - 105)  RR: 18 (11 Nov 2024 06:31) (18 - 18)  SpO2: 97% (11 Nov 2024 06:31) (97% - 99%)    Parameters below as of 11 Nov 2024 06:31  Patient On (Oxygen Delivery Method): room air    PHYSICAL EXAM:  HEENT: normocephalic, conjunctivae and sclerae clear; moist mucous membranes  Neck: supple no LN's   Respiratory: lungs clear no rales  Cardiovascular: S1 S2 reg no murmurs  Gastrointestinal: Hypoactive BS with soft, nondistended abdomen; mildly tender to incision site-NGT   Extremities: no edema  Skin: no rashes  Ortho: no erythema or joint swelling  Neuro: AAO x 3    LABS/DIAGNOSTIC TESTS:                        11.6   6.75  )-----------( 608      ( 11 Nov 2024 06:13 )             34.8     WBC Count: 6.75 K/uL (11-11 @ 06:13)  WBC Count: 9.62 K/uL (11-10 @ 06:35)  WBC Count: 10.84 K/uL (11-09 @ 05:17)  WBC Count: 11.77 K/uL (11-08 @ 06:20)  WBC Count: 11.57 K/uL (11-07 @ 09:45)    11-11    139  |  104  |  9   ----------------------------<  146[H]  4.3   |  29  |  0.74    Ca    8.4      11 Nov 2024 06:13  Phos  2.5     11-11  Mg     2.2     11-11    CULTURES:   .Blood BLOOD  11-08 @ 10:30   No growth at 48 Hours  --  --    .Blood BLOOD  11-06 @ 11:30   Growth in anaerobic bottle: Bacteroides fragilis "Susceptibilities not  performed"  Direct identification is available within approximately 3-5  hours either by Blood Panel Multiplexed PCR or Direct  MALDI-TOF. Details: [email or web address]  --  Blood Culture PCR    .Blood BLOOD  11-06 @ 11:20   Growth in anaerobic bottle: Bacteroides fragilis  "Susceptibilities not performed"  --    Growth in anaerobic bottle: Gram Negative Rods    Clean Catch Clean Catch (Midstream)  10-28 @ 12:30   <10,000 CFU/mL Normal Urogenital Merlene  --  --    RADIOLOGY:  no new studies

## 2024-11-12 LAB
ANION GAP SERPL CALC-SCNC: 8 MMOL/L — SIGNIFICANT CHANGE UP (ref 5–17)
BUN SERPL-MCNC: 9 MG/DL — SIGNIFICANT CHANGE UP (ref 7–18)
CALCIUM SERPL-MCNC: 8.4 MG/DL — SIGNIFICANT CHANGE UP (ref 8.4–10.5)
CHLORIDE SERPL-SCNC: 103 MMOL/L — SIGNIFICANT CHANGE UP (ref 96–108)
CO2 SERPL-SCNC: 27 MMOL/L — SIGNIFICANT CHANGE UP (ref 22–31)
CREAT SERPL-MCNC: 0.75 MG/DL — SIGNIFICANT CHANGE UP (ref 0.5–1.3)
EGFR: 95 ML/MIN/1.73M2 — SIGNIFICANT CHANGE UP
GLUCOSE SERPL-MCNC: 98 MG/DL — SIGNIFICANT CHANGE UP (ref 70–99)
HCT VFR BLD CALC: 35.1 % — LOW (ref 39–50)
HGB BLD-MCNC: 12 G/DL — LOW (ref 13–17)
MAGNESIUM SERPL-MCNC: 2.2 MG/DL — SIGNIFICANT CHANGE UP (ref 1.6–2.6)
MCHC RBC-ENTMCNC: 28.6 PG — SIGNIFICANT CHANGE UP (ref 27–34)
MCHC RBC-ENTMCNC: 34.2 G/DL — SIGNIFICANT CHANGE UP (ref 32–36)
MCV RBC AUTO: 83.8 FL — SIGNIFICANT CHANGE UP (ref 80–100)
NRBC # BLD: 0 /100 WBCS — SIGNIFICANT CHANGE UP (ref 0–0)
PHOSPHATE SERPL-MCNC: 2.4 MG/DL — LOW (ref 2.5–4.5)
PLATELET # BLD AUTO: 644 K/UL — HIGH (ref 150–400)
POTASSIUM SERPL-MCNC: 3.4 MMOL/L — LOW (ref 3.5–5.3)
POTASSIUM SERPL-SCNC: 3.4 MMOL/L — LOW (ref 3.5–5.3)
RBC # BLD: 4.19 M/UL — LOW (ref 4.2–5.8)
RBC # FLD: 14.7 % — HIGH (ref 10.3–14.5)
SODIUM SERPL-SCNC: 138 MMOL/L — SIGNIFICANT CHANGE UP (ref 135–145)
WBC # BLD: 6.74 K/UL — SIGNIFICANT CHANGE UP (ref 3.8–10.5)
WBC # FLD AUTO: 6.74 K/UL — SIGNIFICANT CHANGE UP (ref 3.8–10.5)

## 2024-11-12 RX ORDER — POTASSIUM PHOSPHATE, MONOBASIC POTASSIUM PHOSPHATE, DIBASIC INJECTION, 236; 224 MG/ML; MG/ML
30 SOLUTION, CONCENTRATE INTRAVENOUS ONCE
Refills: 0 | Status: COMPLETED | OUTPATIENT
Start: 2024-11-12 | End: 2024-11-12

## 2024-11-12 RX ADMIN — ENOXAPARIN SODIUM 40 MILLIGRAM(S): 30 INJECTION SUBCUTANEOUS at 13:27

## 2024-11-12 RX ADMIN — PANTOPRAZOLE SODIUM 40 MILLIGRAM(S): 40 TABLET, DELAYED RELEASE ORAL at 13:27

## 2024-11-12 RX ADMIN — METRONIDAZOLE 100 MILLIGRAM(S): 500 TABLET ORAL at 13:27

## 2024-11-12 RX ADMIN — ACETAMINOPHEN 500MG 1000 MILLIGRAM(S): 500 TABLET, COATED ORAL at 07:00

## 2024-11-12 RX ADMIN — METRONIDAZOLE 100 MILLIGRAM(S): 500 TABLET ORAL at 22:11

## 2024-11-12 RX ADMIN — METRONIDAZOLE 100 MILLIGRAM(S): 500 TABLET ORAL at 05:05

## 2024-11-12 RX ADMIN — ACETAMINOPHEN 500MG 1000 MILLIGRAM(S): 500 TABLET, COATED ORAL at 13:01

## 2024-11-12 RX ADMIN — POTASSIUM PHOSPHATE, MONOBASIC POTASSIUM PHOSPHATE, DIBASIC INJECTION, 83.33 MILLIMOLE(S): 236; 224 SOLUTION, CONCENTRATE INTRAVENOUS at 10:06

## 2024-11-12 RX ADMIN — Medication 100 MILLIGRAM(S): at 22:11

## 2024-11-12 RX ADMIN — ACETAMINOPHEN 500MG 400 MILLIGRAM(S): 500 TABLET, COATED ORAL at 05:05

## 2024-11-12 RX ADMIN — ACETAMINOPHEN 500MG 400 MILLIGRAM(S): 500 TABLET, COATED ORAL at 12:46

## 2024-11-12 NOTE — DIETITIAN INITIAL EVALUATION ADULT - NSFNSGIIOFT_GEN_A_CORE
11-11-24 @ 07:01  -  11-12-24 @ 07:00  --------------------------------------------------------  OUT:    Nasogastric/Oral tube (mL): 1400 mL  Total OUT: 1400 mL    Total NET: -1400 mL

## 2024-11-12 NOTE — PROGRESS NOTE ADULT - SUBJECTIVE AND OBJECTIVE BOX
Patient seen and examined at the bedside.  CT scan overnight showing patient still with proximal obstruction.  Patient began endorsing abdominal pain, NGT placed back to suction with 1L bilious output immediately, 1400cc total overnight.  Patient denies nausea, vomiting, endorses flatus but no BM.  Ambulating.  remains NPO, NGT to LIS    Vital Signs Last 24 Hrs  T(C): 36.7 (12 Nov 2024 05:50), Max: 37.2 (11 Nov 2024 21:05)  T(F): 98 (12 Nov 2024 05:50), Max: 99 (11 Nov 2024 21:05)  HR: 58 (12 Nov 2024 05:50) (58 - 88)  BP: 144/75 (12 Nov 2024 05:50) (132/83 - 164/90)  BP(mean): --  RR: 16 (12 Nov 2024 05:50) (16 - 18)  SpO2: 96% (12 Nov 2024 05:50) (95% - 97%)    Parameters below as of 12 Nov 2024 05:50  Patient On (Oxygen Delivery Method): room air    Physical:   GA: AAOx3, NAD, NG in place, bilious output  CVS: RRR  Pulm: nonlabored  Abd: soft, nondistended, minimal incisional tenderness.                           12.0   6.74  )-----------( 644      ( 12 Nov 2024 06:30 )             35.1   11-12    138  |  103  |  9   ----------------------------<  98  3.4[L]   |  27  |  0.75    Ca    8.4      12 Nov 2024 06:30  Phos  2.4     11-12  Mg     2.2     11-12

## 2024-11-12 NOTE — DIETITIAN INITIAL EVALUATION ADULT - ORAL INTAKE PTA/DIET HISTORY
Spoke to pt at bedside, pt reported no new food allergies or intolerances. Pt does not take any vitamins or supplements at home. Pt's intake was less than usual PTA due to recent surgery, nausea/vomiting. Reported UBW: 148 lbs, believes he lost some weight since having his two surgeries.  Nutrition interview: No recent episodes of diarrhea per pt. Pt reported having an episode of emesis on 11/6. Noted with constipation, last BM noted before he came into the facility per pt. Denies any chewing/swallowing difficulties. Pt is currently NPO. Food preferences explored and forwarded to dietary.

## 2024-11-12 NOTE — PROGRESS NOTE ADULT - ASSESSMENT
A/P:   74M  s/p exlap lysis of adhesions, small bowel resection readmitted with recurrent SBO.  -NPO, NGT  -ambulate, OOBTC, IS  -pain control prn  -lovenox  -monitor bowel function  -CTS/Flagyl, f/u blood cultures, f/u ID recs

## 2024-11-12 NOTE — PROGRESS NOTE ADULT - NSPROGADDITIONALINFOA_GEN_ALL_CORE
I have personally seen and examined the patient with surgical team. Agree with the history, physical exam, and plan as documented by the PA.   As in above full notation.  Vitals non-suggestive. Wounds clean and abdomen soft. Patient reports passing gas but no BM. Labs reassuring.  Currently with persistent partial SBO - GGC yesterday with most of contrast in stomach, subsequent CT scan with transition point proximal.   Plan for GI consult for possible scope, trial of reglan for possible gastroparesis. Continue ABX, most recent BCx negative. I have personally seen and examined the patient with surgical team. Agree with the history, physical exam, and plan as documented by the PA.   As in above full notation.  Vitals non-suggestive. Wounds clean and abdomen soft. Patient reports passing gas but no BM. Labs reassuring.  Currently with persistent partial SBO - GGC yesterday with most of contrast in stomach, subsequent CT scan with transition point proximal- contrast seen passed the TP thus considering partial SBO versus gastroparesis.   Plan for GI consult for possible scope, trial of reglan for possible gastroparesis. Continue ABX, most recent BCx negative.

## 2024-11-12 NOTE — DIETITIAN INITIAL EVALUATION ADULT - PERTINENT MEDS FT
MEDICATIONS  (STANDING):  acetaminophen   IVPB .. 1000 milliGRAM(s) IV Intermittent every 6 hours  cefTRIAXone   IVPB 1000 milliGRAM(s) IV Intermittent every 24 hours  diatrizoate meglumine/diatrizoate sodium. 60 milliLiter(s) Oral once  enoxaparin Injectable 40 milliGRAM(s) SubCutaneous every 24 hours  lactated ringers. 1000 milliLiter(s) (100 mL/Hr) IV Continuous <Continuous>  metroNIDAZOLE  IVPB 500 milliGRAM(s) IV Intermittent every 8 hours  pantoprazole  Injectable 40 milliGRAM(s) IV Push every 24 hours    MEDICATIONS  (PRN):  hydrALAZINE Injectable 10 milliGRAM(s) IV Push every 6 hours PRN systolic BP > 160  ondansetron Injectable 4 milliGRAM(s) IV Push every 6 hours PRN Nausea

## 2024-11-12 NOTE — DIETITIAN INITIAL EVALUATION ADULT - ADD RECOMMEND
1) Advance diet to low fiber/low residual once medically feasible.  2) Honor food preferences once medically feasible.   3) Monitor labs, weights, BM's, and skin integrity.

## 2024-11-12 NOTE — DIETITIAN INITIAL EVALUATION ADULT - PERTINENT LABORATORY DATA
11-12    138  |  103  |  9   ----------------------------<  98  3.4[L]   |  27  |  0.75    Ca    8.4      12 Nov 2024 06:30  Phos  2.4     11-12  Mg     2.2     11-12

## 2024-11-13 ENCOUNTER — TRANSCRIPTION ENCOUNTER (OUTPATIENT)
Age: 74
End: 2024-11-13

## 2024-11-13 LAB
ANION GAP SERPL CALC-SCNC: 12 MMOL/L — SIGNIFICANT CHANGE UP (ref 5–17)
BUN SERPL-MCNC: 10 MG/DL — SIGNIFICANT CHANGE UP (ref 7–18)
CALCIUM SERPL-MCNC: 8.2 MG/DL — LOW (ref 8.4–10.5)
CHLORIDE SERPL-SCNC: 104 MMOL/L — SIGNIFICANT CHANGE UP (ref 96–108)
CO2 SERPL-SCNC: 22 MMOL/L — SIGNIFICANT CHANGE UP (ref 22–31)
CREAT SERPL-MCNC: 0.68 MG/DL — SIGNIFICANT CHANGE UP (ref 0.5–1.3)
CULTURE RESULTS: SIGNIFICANT CHANGE UP
EGFR: 98 ML/MIN/1.73M2 — SIGNIFICANT CHANGE UP
GLUCOSE SERPL-MCNC: 84 MG/DL — SIGNIFICANT CHANGE UP (ref 70–99)
HCT VFR BLD CALC: 38.2 % — LOW (ref 39–50)
HGB BLD-MCNC: 12.8 G/DL — LOW (ref 13–17)
MAGNESIUM SERPL-MCNC: 2.1 MG/DL — SIGNIFICANT CHANGE UP (ref 1.6–2.6)
MCHC RBC-ENTMCNC: 28.4 PG — SIGNIFICANT CHANGE UP (ref 27–34)
MCHC RBC-ENTMCNC: 33.5 G/DL — SIGNIFICANT CHANGE UP (ref 32–36)
MCV RBC AUTO: 84.7 FL — SIGNIFICANT CHANGE UP (ref 80–100)
NRBC # BLD: 0 /100 WBCS — SIGNIFICANT CHANGE UP (ref 0–0)
PHOSPHATE SERPL-MCNC: 2 MG/DL — LOW (ref 2.5–4.5)
PLATELET # BLD AUTO: 685 K/UL — HIGH (ref 150–400)
POTASSIUM SERPL-MCNC: 4 MMOL/L — SIGNIFICANT CHANGE UP (ref 3.5–5.3)
POTASSIUM SERPL-SCNC: 4 MMOL/L — SIGNIFICANT CHANGE UP (ref 3.5–5.3)
RBC # BLD: 4.51 M/UL — SIGNIFICANT CHANGE UP (ref 4.2–5.8)
RBC # FLD: 14.8 % — HIGH (ref 10.3–14.5)
SODIUM SERPL-SCNC: 138 MMOL/L — SIGNIFICANT CHANGE UP (ref 135–145)
SPECIMEN SOURCE: SIGNIFICANT CHANGE UP
WBC # BLD: 8.62 K/UL — SIGNIFICANT CHANGE UP (ref 3.8–10.5)
WBC # FLD AUTO: 8.62 K/UL — SIGNIFICANT CHANGE UP (ref 3.8–10.5)

## 2024-11-13 PROCEDURE — 43241 EGD TUBE/CATH INSERTION: CPT | Mod: 58

## 2024-11-13 PROCEDURE — 71045 X-RAY EXAM CHEST 1 VIEW: CPT | Mod: 26

## 2024-11-13 PROCEDURE — 74018 RADEX ABDOMEN 1 VIEW: CPT | Mod: 26

## 2024-11-13 DEVICE — GUIDEWIRE HYDRA JAGWIRE ANGLED .35X260CM: Type: IMPLANTABLE DEVICE | Status: FUNCTIONAL

## 2024-11-13 DEVICE — CATH BLN CRE 7.5FR 6-8MM: Type: IMPLANTABLE DEVICE | Status: FUNCTIONAL

## 2024-11-13 RX ORDER — POTASSIUM PHOSPHATE, MONOBASIC POTASSIUM PHOSPHATE, DIBASIC INJECTION, 236; 224 MG/ML; MG/ML
30 SOLUTION, CONCENTRATE INTRAVENOUS ONCE
Refills: 0 | Status: COMPLETED | OUTPATIENT
Start: 2024-11-13 | End: 2024-11-13

## 2024-11-13 RX ORDER — METOCLOPRAMIDE HYDROCHLORIDE 10 MG/1
10 TABLET ORAL EVERY 8 HOURS
Refills: 0 | Status: DISCONTINUED | OUTPATIENT
Start: 2024-11-13 | End: 2024-11-27

## 2024-11-13 RX ORDER — BENZOCAINE, MENTHOL 15; 3.6 MG/1; MG/1
1 LOZENGE ORAL ONCE
Refills: 0 | Status: DISCONTINUED | OUTPATIENT
Start: 2024-11-13 | End: 2024-11-27

## 2024-11-13 RX ORDER — CEFTRIAXONE SODIUM 1 G
1000 VIAL (EA) INJECTION EVERY 24 HOURS
Refills: 0 | Status: COMPLETED | OUTPATIENT
Start: 2024-11-13 | End: 2024-11-20

## 2024-11-13 RX ORDER — METOCLOPRAMIDE HYDROCHLORIDE 10 MG/1
10 TABLET ORAL EVERY 8 HOURS
Refills: 0 | Status: DISCONTINUED | OUTPATIENT
Start: 2024-11-13 | End: 2024-11-13

## 2024-11-13 RX ORDER — ELECTROLYTE-M SOLUTION/D5W 5 %
1000 INTRAVENOUS SOLUTION INTRAVENOUS
Refills: 0 | Status: DISCONTINUED | OUTPATIENT
Start: 2024-11-13 | End: 2024-11-16

## 2024-11-13 RX ADMIN — POTASSIUM PHOSPHATE, MONOBASIC POTASSIUM PHOSPHATE, DIBASIC INJECTION, 83.33 MILLIMOLE(S): 236; 224 SOLUTION, CONCENTRATE INTRAVENOUS at 16:00

## 2024-11-13 RX ADMIN — METRONIDAZOLE 100 MILLIGRAM(S): 500 TABLET ORAL at 21:13

## 2024-11-13 RX ADMIN — METOCLOPRAMIDE HYDROCHLORIDE 10 MILLIGRAM(S): 10 TABLET ORAL at 21:13

## 2024-11-13 RX ADMIN — Medication 100 MILLIGRAM(S): at 09:36

## 2024-11-13 RX ADMIN — HYDRALAZINE HYDROCHLORIDE 10 MILLIGRAM(S): 10 TABLET ORAL at 05:29

## 2024-11-13 RX ADMIN — METRONIDAZOLE 100 MILLIGRAM(S): 500 TABLET ORAL at 05:29

## 2024-11-13 RX ADMIN — ENOXAPARIN SODIUM 40 MILLIGRAM(S): 30 INJECTION SUBCUTANEOUS at 15:59

## 2024-11-13 RX ADMIN — PANTOPRAZOLE SODIUM 40 MILLIGRAM(S): 40 TABLET, DELAYED RELEASE ORAL at 15:59

## 2024-11-13 NOTE — PROGRESS NOTE ADULT - ASSESSMENT
A/P: 74M  s/p exlap lysis of adhesions, small bowel resection readmitted with recurrent SBO.  -NPO, NGT  -ambulate, OOBTC, IS  -pain control prn  -lovenox  -monitor bowel function  -CTX/Flagyl, f/u blood cultures, f/u ID recs

## 2024-11-13 NOTE — CONSULT NOTE ADULT - SUBJECTIVE AND OBJECTIVE BOX
[facility] NEPHROLOGY- METABOLIC SUPPORT SERVICE CONSULTATION NOTE    Patient is a 74y Male with h/o gastric ulcer (s/p partial gastrectomy and Bilroth II reconstruction in 1970s), VASILIY, HTN , s/p appendectomy (1980s), s/p hydrocele (1990s) recently admitted to [facility] 10/28-11/3 for SBO s/p Ex-lap, SHANON with small bowel resection on 10/28  presents with n/v and abd pain. Pt a/w recurrent SBO and Bacteremia. . Nutrition consulted for TPN.   Repeat BCX 11/8 NG  s/p EGD today with +Nasojejunal enteral tube placement      +periumbilical pain, +subjective wt loss. Pt has not eaten for 2 weeks; last BM 1 week ago. Pt c/o nausea with one episode of vomiting earlier today  Pt denies any fevers, chills,  SOB, chest pain, urinary complaints or LE edema.      PAST MEDICAL & SURGICAL HISTORY:  Gastric ulcer      History of appendectomy      History of partial gastrectomy      History of Billroth II operation        No Known Allergies    Home Medications Reviewed  Hospital Medications:   MEDICATIONS  (STANDING):  cefTRIAXone   IVPB 1000 milliGRAM(s) IV Intermittent every 24 hours  dextrose 5% + sodium chloride 0.45% with potassium chloride 20 mEq/L 1000 milliLiter(s) (100 mL/Hr) IV Continuous <Continuous>  diatrizoate meglumine/diatrizoate sodium. 60 milliLiter(s) Oral once  enoxaparin Injectable 40 milliGRAM(s) SubCutaneous every 24 hours  metoclopramide Injectable 10 milliGRAM(s) IV Push every 8 hours  metroNIDAZOLE  IVPB 500 milliGRAM(s) IV Intermittent every 8 hours  pantoprazole  Injectable 40 milliGRAM(s) IV Push every 24 hours      REVIEW OF SYSTEMS:  CONSTITUTIONAL: No fevers or chills  RESPIRATORY: No cough,or shortness of breath  CARDIOVASCULAR: No chest pain or palpitations.  GASTROINTESTINAL: +rachel-umbilical pain, +nausea with vomiting; 1 episode today. No BM for 1 weeks  GENITOURINARY: No dysuria,  or hematuria  VASCULAR: No bilateral lower extremity edema.   All other review of systems is negative unless indicated above.    VITALS:  T(F): 97.5 (11-13-24 @ 14:41), Max: 98.3 (11-13-24 @ 05:23)  HR: 75 (11-13-24 @ 15:41)  BP: 119/75 (11-13-24 @ 15:41)  RR: 17 (11-13-24 @ 15:41)  SpO2: 96% (11-13-24 @ 15:41)  Wt(kg): --  Height (cm): 167.6 (11-06 @ 09:04), 160 (10-31 @ 10:57)  Weight (kg): 64.4 (11-06 @ 09:04), 68 (10-28 @ 10:18)  BMI (kg/m2): 22.9 (11-06 @ 09:04), 26.6 (10-31 @ 10:57)  BSA (m2): 1.73 (11-06 @ 09:04), 1.71 (10-31 @ 10:57)    11-12 @ 07:01  -  11-13 @ 07:00  --------------------------------------------------------  IN: 0 mL / OUT: 800 mL / NET: -800 mL    11-13 @ 07:01  -  11-13 @ 19:58  --------------------------------------------------------  IN: 1100 mL / OUT: 300 mL / NET: 800 mL      PHYSICAL EXAM:  Constitutional: NAD,  HEENT: anicteric sclera, +NGT to suction, +Nasojejunal enteral tube   Neck: No JVD  Respiratory: CTA b/l  Cardiovascular: S1, S2, RRR  Gastrointestinal: soft, midline staples +BS  Extremities:  No peripheral edema  Neurological: A/O x 3, no focal deficits      LABS:  11-13    138  |  104  |  10  ----------------------------<  84  4.0   |  22  |  0.68    Ca    8.2[L]      13 Nov 2024 06:50  Phos  2.0     11-13  Mg     2.1     11-13      Creatinine Trend: 0.68 <--, 0.75 <--, 0.74 <--, 0.76 <--, 0.72 <--, 0.78 <--, 0.72 <--                        12.8   8.62  )-----------( 685      ( 13 Nov 2024 06:50 )             38.2     Urine Studies:  Urinalysis Basic - ( 13 Nov 2024 06:50 )    Color:  / Appearance:  / SG:  / pH:   Gluc: 84 mg/dL / Ketone:   / Bili:  / Urobili:    Blood:  / Protein:  / Nitrite:    Leuk Esterase:  / RBC:  / WBC    Sq Epi:  / Non Sq Epi:  / Bacteria:         RADIOLOGY & ADDITIONAL STUDIES:    < from: Xray Kidney Ureter Bladder (11.13.24 @ 17:49) >    ACC: [id number] EXAM:  XR KUB 1 VIEW   ORDERED BY: SCOTT MAX     PROCEDURE DATE:  11/13/2024          INTERPRETATION:  KUB: AP    COMPARISON: 11/11/2024 chest x-ray.    CLINICAL INFORMATION: Feeding tube placement.    FINDINGS:  Feeding tube tip kinked in second segment of duodenal sweep.  There is a nonspecific, nonobstructive bowel gas pattern. Dense contrast   within nonobstructed distal large bowel.  No free intra-abdominal air.  No abnormal calcifications.  The osseous structures are intact.    IMPRESSION:    Feeding tube tip kinked in second segment of duodenal sweep..    --- End of Report ---    < end of copied text >  < from: Xray Chest 1 View-PORTABLE IMMEDIATE (Xray Chest 1 View-PORTABLE IMMEDIATE .) (11.13.24 @ 17:49) >    ACC: [id number] EXAM:  XR CHEST PORTABLE IMMED 1V   ORDERED BY: MESWAPNIL MAX     PROCEDURE DATE:  11/13/2024      < end of copied text >  < from: Xray Chest 1 View-PORTABLE IMMEDIATE (Xray Chest 1 View-PORTABLE IMMEDIATE .) (11.13.24 @ 17:49) >    IMPRESSION:  Enteric tube tip beyond GE junction.  LEFT upper lobe zone and perihilar airspace disease.  The.    --- End of Report ---        < end of copied text >  < from: CT Abdomen and Pelvis w/ Oral Cont and w/ IV Cont (11.11.24 @ 19:09) >    ACC: [id number] EXAM:  CT ABDOMEN AND PELVIS OC IC   ORDERED BY: ESTEFANIA FISHER     PROCEDURE DATE:  11/11/2024      < end of copied text >  < from: CT Abdomen and Pelvis w/ Oral Cont and w/ IV Cont (11.11.24 @ 19:09) >  IMPRESSION:  Persistent dilatation of the gastric remnant, duodenum and proximal   jejunum with transition point unchanged in appearance. Findings   consistent with high-grade partial obstruction as enteric contrast   progresses through the GI tract the level of the rectosigmoid colon.    Residual collections of the right abdomen unchanged or smaller than prior.    Air in the bladder either recent intervention, infection or fistula.        --- End of Report ---      < end of copied text >

## 2024-11-13 NOTE — PROGRESS NOTE ADULT - SUBJECTIVE AND OBJECTIVE BOX
Patient seen and examined at the bedside. No acute events.  Afebrile, VSS.  Denies nausea, vomiting.  No bowel function over past 24 hours per patient. no stool or flatus.  NG remains on suction.  Output decreasing, 150cc/24hours, still bilious.     Vital Signs Last 24 Hrs  T(C): 36.8 (13 Nov 2024 05:23), Max: 36.8 (12 Nov 2024 20:45)  T(F): 98.3 (13 Nov 2024 05:23), Max: 98.3 (13 Nov 2024 05:23)  HR: 61 (13 Nov 2024 05:23) (61 - 64)  BP: 161/80 (13 Nov 2024 05:23) (136/82 - 161/80)  BP(mean): 107 (12 Nov 2024 20:45) (100 - 107)  RR: 18 (13 Nov 2024 05:23) (18 - 18)  SpO2: 97% (13 Nov 2024 05:23) (96% - 97%)    Parameters below as of 13 Nov 2024 05:23  Patient On (Oxygen Delivery Method): room air    Physical:   GA: AAOx3, NAD, NG in place, bilious output  CVS: RRR  Pulm: nonlabored  Abd: soft, nondistended, minimal incisional tenderness. incision with staples, well appearing                          12.0   6.74  )-----------( 644      ( 12 Nov 2024 06:30 )             35.1   11-12    138  |  103  |  9   ----------------------------<  98  3.4[L]   |  27  |  0.75    Ca    8.4      12 Nov 2024 06:30  Phos  2.4     11-12  Mg     2.2     11-12

## 2024-11-13 NOTE — PACU DISCHARGE NOTE - AIRWAY PATENCY:
Satisfactory Z Plasty Text: The lesion was extirpated to the level of the fat with a #15 scalpel blade.  Given the location of the defect, shape of the defect and the proximity to free margins a Z-plasty was deemed most appropriate for repair.  Using a sterile surgical marker, the appropriate transposition arms of the Z-plasty were drawn incorporating the defect and placing the expected incisions within the relaxed skin tension lines where possible.    The area thus outlined was incised deep to adipose tissue with a #15 scalpel blade.  The skin margins were undermined to an appropriate distance in all directions utilizing iris scissors.  The opposing transposition arms were then transposed into place in opposite direction and anchored with interrupted buried subcutaneous sutures  and carried over to close the primary defect.

## 2024-11-13 NOTE — CONSULT NOTE ADULT - ASSESSMENT
Patient is a 74y Male with h/o gastric ulcer (s/p partial gastrectomy and Bilroth II reconstruction in 1970s), VASILIY, HTN , s/p appendectomy (1980s), s/p hydrocele (1990s) recently admitted to [facility] 10/28-11/3 for SBO s/p Ex-lap, SHANON with small bowel resection on 10/28  presents with n/v and abd pain. Pt a/w recurrent SBO and Bacteremia. . Nutrition consulted for TPN.   Repeat BCX 11/8 NG  s/p EGD today with +Nasojejunal enteral tube placement    Will give TPN with lipids @ 1L tonight (1/2 dose on first day), Will d/c IVF tonight.   Check FS 15 min and 1hr after starting TPN and then q6h thereafter. Check hgbA1C in am.   Check triglycerides. Check CMP/Mg/Phos/ Ionized calcium in am.   Daily weights. Strict I/O. Hold TPN if pt spikes fever and check BCX x2          [facility] NEPHROLOGY  Jose Angel Horner M.D.  Caden Heck D.O.  Amalia Allen M.D.  MD Eugenia Cazares, MSN, ANP-C    Telephone: [phone]  Facsimile: [phone] [place] [address], [address]  [place] [address]   Patient is a 74y Male with h/o gastric ulcer (s/p partial gastrectomy and Bilroth II reconstruction in 1970s), VASILIY, HTN , s/p appendectomy (1980s), s/p hydrocele (1990s) recently admitted to [facility] 10/28-11/3 for SBO s/p Ex-lap, SHANON with small bowel resection on 10/28  presents with n/v and abd pain. Pt a/w recurrent SBO and Bacteremia. . Nutrition consulted for TPN.   Repeat BCX 11/8 NG  s/p EGD today with +Nasojejunal enteral tube placement      1. Severe PCM- ~5% wt loss in 1 week, NPO >1 week. (pt states he has not eaten in 2 weeks).  Pt for possible enteral feeding via Naso-jejunal tube. If pt unable to tolerate enteral feeding; TPN will be appropriate. BCx 11/8- NG  c/w IVF for now.   Check hgbA1C in am. Check triglycerides. Check CMP/Mg/Phos/ Ionized calcium in am.   Daily weights. Strict I/O.   2. Hypophosphatemia- pt give Kphos IV x1. Monitor K/Mg/ Phos daily.   3. Bacteremia/ Enteritis- Pt on Cefepime/ Flagyl. BCx+ 11/ 6- Bacteroides fragilis. Repeat BCx 11/8 NG. ID following  4. Essential HTN- BP borderline. Pt on Hydralazine 10mg IV q 6hr prn SBP >160. Monitor BP  5. SBO- plan as per surgery      [facility] NEPHROLOGY  Jose Angel Horner M.D.  DIA WakefieldO.  Amalia Allen M.D.  MD Eugenia Cazares, MSN, ANP-C    Telephone: [phone]  Facsimile: [phone], [address]  [place] [address]

## 2024-11-13 NOTE — PROGRESS NOTE ADULT - NSPROGADDITIONALINFOA_GEN_ALL_CORE
I have personally seen and examined the patient with surgical team. Agree with the history, physical exam, and plan as documented by the PA.   As in above full notation.  Vitals non-suggestive.  NGT with bilious output - non-reliable output on record, currently 600cc bilious on canister.   Wounds clean and abdomen soft with appropriate incisional tenderness.  Labs reassuring.  Patient with persistent partial SBO, passing gas however no bowel movement.   Spoke to patient's outpatient GI Dr. Cooper - last EGD 2022, patient with history of persistent ulceration at the GJ anastomosis for several years. Scoped at [facility]  EGD on 5/5/22. Large gastrojejunal anastomotic ulcer with flat pigmented spots vs visible vessel. Scattered gastric angiodysplasias.   Plan for EGD today, continue current supportive care, NGT, consult nephro for PPN versus TPN, continue ABX for bacteremia most recent cultures are negative.   Reviewed with patient in detail, Surgical team as well as RN's.

## 2024-11-14 LAB
A1C WITH ESTIMATED AVERAGE GLUCOSE RESULT: 5.9 % — HIGH (ref 4–5.6)
ALBUMIN SERPL ELPH-MCNC: 2.3 G/DL — LOW (ref 3.5–5)
ALP SERPL-CCNC: 111 U/L — SIGNIFICANT CHANGE UP (ref 40–120)
ALT FLD-CCNC: 23 U/L DA — SIGNIFICANT CHANGE UP (ref 10–60)
ANION GAP SERPL CALC-SCNC: 6 MMOL/L — SIGNIFICANT CHANGE UP (ref 5–17)
AST SERPL-CCNC: 17 U/L — SIGNIFICANT CHANGE UP (ref 10–40)
BILIRUB SERPL-MCNC: 0.3 MG/DL — SIGNIFICANT CHANGE UP (ref 0.2–1.2)
BUN SERPL-MCNC: 5 MG/DL — LOW (ref 7–18)
CALCIUM SERPL-MCNC: 8 MG/DL — LOW (ref 8.4–10.5)
CHLORIDE SERPL-SCNC: 110 MMOL/L — HIGH (ref 96–108)
CO2 SERPL-SCNC: 26 MMOL/L — SIGNIFICANT CHANGE UP (ref 22–31)
CREAT SERPL-MCNC: 0.81 MG/DL — SIGNIFICANT CHANGE UP (ref 0.5–1.3)
EGFR: 93 ML/MIN/1.73M2 — SIGNIFICANT CHANGE UP
ESTIMATED AVERAGE GLUCOSE: 123 MG/DL — HIGH (ref 68–114)
GLUCOSE SERPL-MCNC: 144 MG/DL — HIGH (ref 70–99)
HCT VFR BLD CALC: 35.4 % — LOW (ref 39–50)
HGB BLD-MCNC: 11.8 G/DL — LOW (ref 13–17)
MAGNESIUM SERPL-MCNC: 2.1 MG/DL — SIGNIFICANT CHANGE UP (ref 1.6–2.6)
MCHC RBC-ENTMCNC: 27.9 PG — SIGNIFICANT CHANGE UP (ref 27–34)
MCHC RBC-ENTMCNC: 33.3 G/DL — SIGNIFICANT CHANGE UP (ref 32–36)
MCV RBC AUTO: 83.7 FL — SIGNIFICANT CHANGE UP (ref 80–100)
NRBC # BLD: 0 /100 WBCS — SIGNIFICANT CHANGE UP (ref 0–0)
PHOSPHATE SERPL-MCNC: 1.8 MG/DL — LOW (ref 2.5–4.5)
PLATELET # BLD AUTO: 588 K/UL — HIGH (ref 150–400)
POTASSIUM SERPL-MCNC: 4.4 MMOL/L — SIGNIFICANT CHANGE UP (ref 3.5–5.3)
POTASSIUM SERPL-SCNC: 4.4 MMOL/L — SIGNIFICANT CHANGE UP (ref 3.5–5.3)
PROT SERPL-MCNC: 5.7 G/DL — LOW (ref 6–8.3)
RBC # BLD: 4.23 M/UL — SIGNIFICANT CHANGE UP (ref 4.2–5.8)
RBC # FLD: 15.4 % — HIGH (ref 10.3–14.5)
SODIUM SERPL-SCNC: 142 MMOL/L — SIGNIFICANT CHANGE UP (ref 135–145)
TRIGL SERPL-MCNC: 122 MG/DL — SIGNIFICANT CHANGE UP
WBC # BLD: 6.6 K/UL — SIGNIFICANT CHANGE UP (ref 3.8–10.5)
WBC # FLD AUTO: 6.6 K/UL — SIGNIFICANT CHANGE UP (ref 3.8–10.5)

## 2024-11-14 PROCEDURE — 74018 RADEX ABDOMEN 1 VIEW: CPT | Mod: 26

## 2024-11-14 RX ORDER — HEPARIN SODIUM 5000 [USP'U]/.5ML
30 INJECTION, SOLUTION INTRAVENOUS; SUBCUTANEOUS ONCE
Refills: 0 | Status: COMPLETED | OUTPATIENT
Start: 2024-11-14 | End: 2024-11-14

## 2024-11-14 RX ADMIN — ENOXAPARIN SODIUM 40 MILLIGRAM(S): 30 INJECTION SUBCUTANEOUS at 13:05

## 2024-11-14 RX ADMIN — METRONIDAZOLE 100 MILLIGRAM(S): 500 TABLET ORAL at 21:26

## 2024-11-14 RX ADMIN — Medication 100 MILLIGRAM(S): at 08:36

## 2024-11-14 RX ADMIN — METOCLOPRAMIDE HYDROCHLORIDE 10 MILLIGRAM(S): 10 TABLET ORAL at 13:04

## 2024-11-14 RX ADMIN — METRONIDAZOLE 100 MILLIGRAM(S): 500 TABLET ORAL at 13:06

## 2024-11-14 RX ADMIN — METOCLOPRAMIDE HYDROCHLORIDE 10 MILLIGRAM(S): 10 TABLET ORAL at 21:26

## 2024-11-14 RX ADMIN — HEPARIN SODIUM 85 MILLIMOLE(S): 5000 INJECTION, SOLUTION INTRAVENOUS; SUBCUTANEOUS at 11:49

## 2024-11-14 RX ADMIN — PANTOPRAZOLE SODIUM 40 MILLIGRAM(S): 40 TABLET, DELAYED RELEASE ORAL at 13:04

## 2024-11-14 RX ADMIN — METRONIDAZOLE 100 MILLIGRAM(S): 500 TABLET ORAL at 05:22

## 2024-11-14 RX ADMIN — METOCLOPRAMIDE HYDROCHLORIDE 10 MILLIGRAM(S): 10 TABLET ORAL at 05:22

## 2024-11-14 NOTE — PROGRESS NOTE ADULT - ATTENDING COMMENTS
As in above full notation.  I personally seen/ examined during daily rounds.  Vitals non-suggestive s/p EGD and feeding tube placement  NGT and Dobhoff. Wounds clean and abdomen soft with appropriate incisional tenderness.  Labs reassuring.  Plan for nutrition consult and feed through dobhoff. Will consider GI versus IR for GJ feeding tube.   To continue current supportive care.  Reviewed with patient in detail, Surgical team as well as RN's. As in above full notation.  I personally seen/ examined during daily rounds.  Vitals non-suggestive s/p EGD and feeding tube placement  NGT and Dobhoff. Wounds clean and abdomen soft with appropriate incisional tenderness.  Labs reassuring.  Plan for nutrition consult and feed through dobhoff - trial. Will consider GI versus IR for GJ feeding tube.   To continue current supportive care, continue ABX per ID, Bcx NGTD.   Reviewed with patient in detail, Surgical team as well as RN's.

## 2024-11-14 NOTE — PROGRESS NOTE ADULT - SUBJECTIVE AND OBJECTIVE BOX
[facility] NEPHROLOGY- PROGRESS NOTE    Patient is a 74y Male with h/o gastric ulcer (s/p partial gastrectomy and Bilroth II reconstruction in 1970s), VASILIY, HTN , s/p appendectomy (1980s), s/p hydrocele (1990s) recently admitted to [facility] 10/28-11/3 for SBO s/p Ex-lap, SHANON with small bowel resection on 10/28  presents with n/v and abd pain. Pt a/w recurrent SBO and Bacteremia. . Nutrition consulted for TPN.   Repeat BCX 11/8 NG  s/p EGD 11/13 with +Nasojejunal enteral tube placement    Hospital Medications: Medications reviewed.  REVIEW OF SYSTEMS:  CONSTITUTIONAL: No fevers or chills  RESPIRATORY: No shortness of breath  CARDIOVASCULAR: No chest pain.  GASTROINTESTINAL: No nausea, or vomiting,   or abdominal pain. No BM   VASCULAR: No bilateral lower extremity edema.     VITALS:  T(F): 98.1 (11-14-24 @ 12:42), Max: 98.1 (11-14-24 @ 05:42)  HR: 65 (11-14-24 @ 12:42)  BP: 138/77 (11-14-24 @ 12:42)  RR: 18 (11-14-24 @ 12:42)  SpO2: 97% (11-14-24 @ 12:42)  Wt(kg): --  Height (cm): 167.6 (11-06 @ 09:04), 160 (10-31 @ 10:57)  Weight (kg): 64.4 (11-06 @ 09:04), 68 (10-28 @ 10:18)  BMI (kg/m2): 22.9 (11-06 @ 09:04), 26.6 (10-31 @ 10:57)  BSA (m2): 1.73 (11-06 @ 09:04), 1.71 (10-31 @ 10:57)    11-13 @ 07:01  -  11-14 @ 07:00  --------------------------------------------------------  IN: 2500 mL / OUT: 2750 mL / NET: -250 mL    11-14 @ 07:01  -  11-14 @ 13:26  --------------------------------------------------------  IN: 700 mL / OUT: 0 mL / NET: 700 mL      PHYSICAL EXAM:  Constitutional: NAD,  HEENT: anicteric sclera, +NGT clamped, +Nasojejunal enteral tube   Neck: No JVD  Respiratory: CTA b/l  Cardiovascular: S1, S2, RRR  Gastrointestinal: soft, midline staples +BS  Extremities:  No peripheral edema       LABS:  11-14    142  |  110[H]  |  5[L]  ----------------------------<  144[H]  4.4   |  26  |  0.81    Ca    8.0[L]      14 Nov 2024 06:41  Phos  1.8     11-14  Mg     2.1     11-14    TPro  5.7[L]  /  Alb  2.3[L]  /  TBili  0.3  /  DBili      /  AST  17  /  ALT  23  /  AlkPhos  111  11-14    Creatinine Trend: 0.81 <--, 0.68 <--, 0.75 <--, 0.74 <--, 0.76 <--, 0.72 <--, 0.78 <--                        11.8   6.60  )-----------( 588      ( 14 Nov 2024 06:41 )             35.4     Urine Studies:  Urinalysis Basic - ( 14 Nov 2024 06:41 )    Color:  / Appearance:  / SG:  / pH:   Gluc: 144 mg/dL / Ketone:   / Bili:  / Urobili:    Blood:  / Protein:  / Nitrite:    Leuk Esterase:  / RBC:  / WBC    Sq Epi:  / Non Sq Epi:  / Bacteria:         RADIOLOGY & ADDITIONAL STUDIES:

## 2024-11-14 NOTE — CHART NOTE - NSCHARTNOTEFT_GEN_A_CORE
Pt seen for TF recommendation     Pt s/p exlap lysis of adhesions, small bowel resection readmitted with recurrent SBO. Nasojejunal enteral tube placement. No recent episodes of nausea, vomiting. NG remains on suction with bilious 650 cc output. Total net I/O's -250mL. TF recommendation: Vital 1.2 AF Mauri, initiate 16hr/day TF @10ml/hr increasing by 10mL every 4 hours until goal rate @85ml/hr to reach total volume of 1360ml. TF formula should provide total 1632 kcal, 102g pro, 1103ml free water. Monitor pt's I/O's. Consider banatrol 2 x day if pt has high output. Consider 24hr/day TF @60mL/hr if pt does not tolerate current feed rate.    Diet Prescription: Diet, NPO (11-06-24 @ 15:17)    Pertinent Medications: MEDICATIONS  (STANDING):  benzocaine/menthol Lozenge 1 Lozenge Oral once  cefTRIAXone   IVPB 1000 milliGRAM(s) IV Intermittent every 24 hours  dextrose 5% + sodium chloride 0.45% with potassium chloride 20 mEq/L 1000 milliLiter(s) (100 mL/Hr) IV Continuous <Continuous>  diatrizoate meglumine/diatrizoate sodium. 60 milliLiter(s) Oral once  enoxaparin Injectable 40 milliGRAM(s) SubCutaneous every 24 hours  metoclopramide Injectable 10 milliGRAM(s) IV Push every 8 hours  metroNIDAZOLE  IVPB 500 milliGRAM(s) IV Intermittent every 8 hours  pantoprazole  Injectable 40 milliGRAM(s) IV Push every 24 hours  sodium phosphate 30 milliMole(s)/500 mL IVPB 30 milliMole(s) IV Intermittent once    MEDICATIONS  (PRN):  hydrALAZINE Injectable 10 milliGRAM(s) IV Push every 6 hours PRN systolic BP > 160  ondansetron Injectable 4 milliGRAM(s) IV Push every 6 hours PRN Nausea    Pertinent Labs: 11-14 Na142 mmol/L Glu 144 mg/dL[H] K+ 4.4 mmol/L Cr  0.81 mg/dL BUN 5 mg/dL[L] 11-14 Phos 1.8 mg/dL[L] 11-14 Alb 2.3 g/dL[L] 11-14 Chol --    LDL --    HDL --    Trig 122 mg/dL     CAPILLARY BLOOD GLUCOSE      Weight: 142 lbs  Height: 66 in   IBW: 142 lbs +/-10%  BMI: 22.9 kg/m^2    Physical Assessment, per flowsheets:  Edema:  Pressure Injury:  Appearance:     Estimated Needs:   [X] No change since previous assessment    Nutrition Diagnosis:   [x] Altered GI Function    Interventions:   1) Recommend TF as medically feasible.  3) Monitor TF rate, weights, I'O's, labs, BM's, and skin integrity.     Rolando Mitchell RD (Available on teams) Pt seen for TF recommendation     Pt s/p exlap lysis of adhesions, small bowel resection readmitted with recurrent SBO. Nasojejunal enteral tube placement. No recent episodes of nausea, vomiting. NG remains on suction with bilious 650 cc output. Total net I/O's -250mL. TF recommendation: Vital 1.2 AF Mauri, initiate 24hr/day TF @10ml/hr increasing by 10mL every 4 hours until goal rate @60ml/hr to reach total volume of 1440ml. TF formula should provide total 1728 kcal, 108g pro, 1168ml free water. Monitor pt's I/O's. Consider banatrol 2 x day if pt has high output. Pt currently NPO >1 week, likely >5% weight loss x 1 week, pt diagnosed with severe malnutrition.    Diet Prescription: Diet, NPO (11-06-24 @ 15:17)    Pertinent Medications: MEDICATIONS  (STANDING):  benzocaine/menthol Lozenge 1 Lozenge Oral once  cefTRIAXone   IVPB 1000 milliGRAM(s) IV Intermittent every 24 hours  dextrose 5% + sodium chloride 0.45% with potassium chloride 20 mEq/L 1000 milliLiter(s) (100 mL/Hr) IV Continuous <Continuous>  diatrizoate meglumine/diatrizoate sodium. 60 milliLiter(s) Oral once  enoxaparin Injectable 40 milliGRAM(s) SubCutaneous every 24 hours  metoclopramide Injectable 10 milliGRAM(s) IV Push every 8 hours  metroNIDAZOLE  IVPB 500 milliGRAM(s) IV Intermittent every 8 hours  pantoprazole  Injectable 40 milliGRAM(s) IV Push every 24 hours  sodium phosphate 30 milliMole(s)/500 mL IVPB 30 milliMole(s) IV Intermittent once    MEDICATIONS  (PRN):  hydrALAZINE Injectable 10 milliGRAM(s) IV Push every 6 hours PRN systolic BP > 160  ondansetron Injectable 4 milliGRAM(s) IV Push every 6 hours PRN Nausea    Pertinent Labs: 11-14 Na142 mmol/L Glu 144 mg/dL[H] K+ 4.4 mmol/L Cr  0.81 mg/dL BUN 5 mg/dL[L] 11-14 Phos 1.8 mg/dL[L] 11-14 Alb 2.3 g/dL[L] 11-14 Chol --    LDL --    HDL --    Trig 122 mg/dL     CAPILLARY BLOOD GLUCOSE      Weight: 142 lbs  Height: 66 in   IBW: 142 lbs +/-10%  BMI: 22.9 kg/m^2    Estimated Needs:   [X] No change since previous assessment    Nutrition Diagnosis:   [x] Altered GI Function    Interventions:   1) Recommend TF as medically feasible.  3) Monitor TF rate, weights, I'O's, labs, BM's, and skin integrity.     Rolando Mitchell RD (Available on teams) Pt seen for TF recommendation     Pt s/p exlap lysis of adhesions, small bowel resection readmitted with recurrent SBO. Nasojejunal enteral tube placement. No recent episodes of nausea, vomiting. NG remains on suction with bilious 650 cc output. Total net I/O's -250mL. TF recommendation: Vital 1.2 AF Mauri, initiate 24hr/day TF @10ml/hr increasing by 10mL every 4 hours until goal rate @60ml/hr to reach total volume of 1440ml. TF formula should provide total 1728 kcal, 108g pro, 1168ml free water. Monitor pt's I/O's. Consider banatrol 2 x day if pt has high output. Pt currently NPO 1 week, inadequate PO > 5 days, suspected >2% weight loss x 1 week, pt diagnosed with severe malnutrition.    Diet Prescription: Diet, NPO (11-06-24 @ 15:17)    Pertinent Medications: MEDICATIONS  (STANDING):  benzocaine/menthol Lozenge 1 Lozenge Oral once  cefTRIAXone   IVPB 1000 milliGRAM(s) IV Intermittent every 24 hours  dextrose 5% + sodium chloride 0.45% with potassium chloride 20 mEq/L 1000 milliLiter(s) (100 mL/Hr) IV Continuous <Continuous>  diatrizoate meglumine/diatrizoate sodium. 60 milliLiter(s) Oral once  enoxaparin Injectable 40 milliGRAM(s) SubCutaneous every 24 hours  metoclopramide Injectable 10 milliGRAM(s) IV Push every 8 hours  metroNIDAZOLE  IVPB 500 milliGRAM(s) IV Intermittent every 8 hours  pantoprazole  Injectable 40 milliGRAM(s) IV Push every 24 hours  sodium phosphate 30 milliMole(s)/500 mL IVPB 30 milliMole(s) IV Intermittent once    MEDICATIONS  (PRN):  hydrALAZINE Injectable 10 milliGRAM(s) IV Push every 6 hours PRN systolic BP > 160  ondansetron Injectable 4 milliGRAM(s) IV Push every 6 hours PRN Nausea    Pertinent Labs: 11-14 Na142 mmol/L Glu 144 mg/dL[H] K+ 4.4 mmol/L Cr  0.81 mg/dL BUN 5 mg/dL[L] 11-14 Phos 1.8 mg/dL[L] 11-14 Alb 2.3 g/dL[L] 11-14 Chol --    LDL --    HDL --    Trig 122 mg/dL     CAPILLARY BLOOD GLUCOSE      Weight: 142 lbs  Height: 66 in   IBW: 142 lbs +/-10%  BMI: 22.9 kg/m^2    Estimated Needs:   [X] No change since previous assessment    Nutrition Diagnosis:   [x] Malnutrition  Severe malnutrition in the context of acute illness or injury - Acute illness or injury (SBO) as evidenced by NPO > 1 week, weight loss >2% x 1 week. Goal: Pt to meet >75% of estimated energy needs as medically feasible.     Interventions:   1) Recommend Vital 1.2 TF as medically feasible.  3) Monitor TF rate, weights, I'O's, labs, BM's, and skin integrity.     Rolando Mitchell RD (Available on teams) Pt seen for TF recommendation     Pt s/p exlap lysis of adhesions, small bowel resection readmitted with recurrent SBO. Nasojejunal enteral tube placement. No recent episodes of nausea, vomiting. NG remains on suction with bilious 650 cc output. Total net I/O's -250mL. TF recommendation: Vital 1.2 AF Mauri, initiate 24hr/day TF @10ml/hr increasing by 10mL every 4 hours until goal rate @60ml/hr to reach total volume of 1440ml. TF formula should provide total 1728 kcal, 108g pro, 1168ml free water. Monitor pt's I/O's. Consider banatrol 2 x day if pt has high output. Pt currently NPO 1 week, inadequate PO > 5 days, suspected >2% weight loss x 1 week, pt diagnosed with severe malnutrition.    Diet Prescription: Diet, NPO (11-06-24 @ 15:17)    Pertinent Medications: MEDICATIONS  (STANDING):  benzocaine/menthol Lozenge 1 Lozenge Oral once  cefTRIAXone   IVPB 1000 milliGRAM(s) IV Intermittent every 24 hours  dextrose 5% + sodium chloride 0.45% with potassium chloride 20 mEq/L 1000 milliLiter(s) (100 mL/Hr) IV Continuous <Continuous>  diatrizoate meglumine/diatrizoate sodium. 60 milliLiter(s) Oral once  enoxaparin Injectable 40 milliGRAM(s) SubCutaneous every 24 hours  metoclopramide Injectable 10 milliGRAM(s) IV Push every 8 hours  metroNIDAZOLE  IVPB 500 milliGRAM(s) IV Intermittent every 8 hours  pantoprazole  Injectable 40 milliGRAM(s) IV Push every 24 hours  sodium phosphate 30 milliMole(s)/500 mL IVPB 30 milliMole(s) IV Intermittent once    MEDICATIONS  (PRN):  hydrALAZINE Injectable 10 milliGRAM(s) IV Push every 6 hours PRN systolic BP > 160  ondansetron Injectable 4 milliGRAM(s) IV Push every 6 hours PRN Nausea    Pertinent Labs: 11-14 Na142 mmol/L Glu 144 mg/dL[H] K+ 4.4 mmol/L Cr  0.81 mg/dL BUN 5 mg/dL[L] 11-14 Phos 1.8 mg/dL[L] 11-14 Alb 2.3 g/dL[L] 11-14 Chol --    LDL --    HDL --    Trig 122 mg/dL     CAPILLARY BLOOD GLUCOSE      Weight: 142 lbs  Height: 66 in   IBW: 142 lbs +/-10%  BMI: 22.9 kg/m^2    Estimated Needs:   [X] No change since previous assessment    Nutrition Diagnosis:   [x] Malnutrition   Severe malnutrition in the context of acute illness or injury - Acute illness or injury (SBO) as evidenced per MD in chart: NPO > 1 week, weight loss ~5% x 1 week. Goal: Pt to meet >75% of estimated energy needs as medically feasible.     Interventions:   1) Recommend Vital 1.2 TF as medically feasible.  3) Monitor TF rate, weights, I'O's, labs, BM's, and skin integrity.     Rolando Mitchell RD (Available on teams)

## 2024-11-14 NOTE — PROGRESS NOTE ADULT - ASSESSMENT
Patient is a 74y Male with h/o gastric ulcer (s/p partial gastrectomy and Bilroth II reconstruction in 1970s), VASILIY, HTN , s/p appendectomy (1980s), s/p hydrocele (1990s) recently admitted to [facility] 10/28-11/3 for SBO s/p Ex-lap, SHANON with small bowel resection on 10/28  presents with n/v and abd pain. Pt a/w recurrent SBO and Bacteremia. . Nutrition consulted for TPN.   Repeat BCX 11/8 NG  s/p EGD 11/13 with +Nasojejunal enteral tube placement      1. Severe PCM- ~5% wt loss in 1 week, NPO >1 week. (pt states he has not eaten in 2 weeks).  Plan for enteral feeding via Naso-jejunal tube today. If pt unable to tolerate enteral feeding; TPN will be appropriate. BCx 11/8- NG. Will need IR PICC line placement if TPN is needed. c/w IVF for now.   f/u  hgbA1C. Triglycerides 122. Check CMP/Mg/Phos/ Ionized calcium in am. Check Coags in am. Daily weights. Strict I/O.   2. Hypophosphatemia- will give Naphos IV x1. Monitor K/Mg/ Phos daily.   3. Bacteremia/ Enteritis- Pt on Cefepime/ Flagyl. BCx+ 11/ 6- Bacteroides fragilis. Repeat BCx 11/8 NG. ID following  4. Essential HTN- BP acceptable. Pt on Hydralazine 10mg IV q 6hr prn SBP >160. Monitor BP  5. SBO- plan as per surgery    Discussed with surgical team.     TPN orders if needed  Weight 64.4 kg  Total Kcal 1800  Fat 50g = 500 kcal  Protein 78g = 312 kcal  Dextrose 290g = 988 kcal    [facility] NEPHROLOGY  Jose Angel Horner M.D.  Caden Heck D.O.  Amalia Allen M.D.  MD Eugenia Cazares, MSN, ANP-C    Telephone: [phone]  Facsimile: [phone]    [address] [place] [address], #[facility]  [place] [address]

## 2024-11-14 NOTE — PROGRESS NOTE ADULT - SUBJECTIVE AND OBJECTIVE BOX
Patient seen and examined at the bedside. No acute events.  Afebrile, VSS.  Denies nausea, vomiting.  No stool or flatus.  NG remains on suction with bilious 650 cc output. Mobilized well.     Vital Signs Last 24 Hrs  T(C): 36.7 (14 Nov 2024 05:42), Max: 36.7 (13 Nov 2024 12:34)  T(F): 98.1 (14 Nov 2024 05:42), Max: 98.1 (13 Nov 2024 12:34)  HR: 67 (14 Nov 2024 05:42) (67 - 79)  BP: 124/71 (14 Nov 2024 05:42) (105/67 - 162/87)  BP(mean): 92 (13 Nov 2024 20:38) (79 - 108)  RR: 18 (14 Nov 2024 05:42) (15 - 20)  SpO2: 97% (14 Nov 2024 05:42) (96% - 99%)    Parameters below as of 14 Nov 2024 05:42  Patient On (Oxygen Delivery Method): room air    I&O's Detail    13 Nov 2024 07:01  -  14 Nov 2024 07:00  --------------------------------------------------------  IN:    dextrose 5% + sodium chloride 0.45% w/ Additives: 2300 mL    IV PiggyBack: 200 mL  Total IN: 2500 mL    OUT:    Nasogastric/Oral tube (mL): 650 mL    Voided (mL): 2100 mL  Total OUT: 2750 mL    Total NET: -250 mL    Physical:   GA: AAOx3, NAD, NG in place, bilious output  CVS: RRR  Pulm: nonlabored  Abd: soft, nondistended, minimal incisional tenderness. incision with staples, well appearing    MEDICATIONS  (STANDING):  benzocaine/menthol Lozenge 1 Lozenge Oral once  cefTRIAXone   IVPB 1000 milliGRAM(s) IV Intermittent every 24 hours  dextrose 5% + sodium chloride 0.45% with potassium chloride 20 mEq/L 1000 milliLiter(s) (100 mL/Hr) IV Continuous <Continuous>  diatrizoate meglumine/diatrizoate sodium. 60 milliLiter(s) Oral once  enoxaparin Injectable 40 milliGRAM(s) SubCutaneous every 24 hours  metoclopramide Injectable 10 milliGRAM(s) IV Push every 8 hours  metroNIDAZOLE  IVPB 500 milliGRAM(s) IV Intermittent every 8 hours  pantoprazole  Injectable 40 milliGRAM(s) IV Push every 24 hours    MEDICATIONS  (PRN):  hydrALAZINE Injectable 10 milliGRAM(s) IV Push every 6 hours PRN systolic BP > 160  ondansetron Injectable 4 milliGRAM(s) IV Push every 6 hours PRN Nausea                          11.8   6.60  )-----------( 588      ( 14 Nov 2024 06:41 )             35.4     11-13    138  |  104  |  10  ----------------------------<  84  4.0   |  22  |  0.68    Ca    8.2[L]      13 Nov 2024 06:50  Phos  2.0     11-13  Mg     2.1     11-13

## 2024-11-14 NOTE — PROGRESS NOTE ADULT - ASSESSMENT
74M  s/p exlap lysis of adhesions, small bowel resection readmitted with recurrent SBO.  s/p EGD on 11/13; stricture was found which was dilated, feeding tube passed through that portion, NGT replaced      -NPO, NGT to LIS  -Potentially start TPN vs feeding today, appreciated nephrology recommendations    -Ambulate, OOBTC, IS  -Pain control prn  -Continue Lovenox  -Monitor bowel function  -CTX/Flagyl, f/u blood cultures, f/u ID recs

## 2024-11-14 NOTE — DIETITIAN NUTRITION RISK NOTIFICATION - TREATMENT: THE FOLLOWING DIET HAS BEEN RECOMMENDED
Diet, NPO with Tube Feed:   Tube Feeding Modality: Nasojejunal  Vital AF 1.2 Mauri  Total Volume for 24 Hours (mL): 1440  Continuous  Starting Tube Feed Rate {mL per Hour}: 10  Increase Tube Feed Rate by (mL): 10     Every 4 hours  Until Goal Tube Feed Rate (mL per Hour): 60  Tube Feed Duration (in Hours): 24  Tube Feed Start Time: 13:00  Free Water Flush Instructions:  1168 mL (11-14-24 @ 12:48) [Pending Verification By Attending]  Diet, NPO with Tube Feed:   Tube Feeding Modality: Nasojejunal  Vital AF 1.2 Mauri  Total Volume for 24 Hours (mL): 1440  Continuous  Starting Tube Feed Rate {mL per Hour}: 10  Increase Tube Feed Rate by (mL): 10     Every 4 hours  Until Goal Tube Feed Rate (mL per Hour): 60  Tube Feed Duration (in Hours): 24  Tube Feed Start Time: 13:00  Free Water Flush Instructions:  1103 mL (11-14-24 @ 12:47) [Pending Verification By Attending]  Diet, NPO with Tube Feed:   Tube Feeding Modality: Nasogastric  Vital AF 1.2 Mauri  Total Volume for 24 Hours (mL): 1360  Continuous  Starting Tube Feed Rate {mL per Hour}: 10  Increase Tube Feed Rate by (mL): 10     Every 4 hours  Until Goal Tube Feed Rate (mL per Hour): 85  Tube Feed Duration (in Hours): 16  Tube Feed Start Time: 12:00  Tube Feed Stop Time: 06:00  Free Water Flush Instructions:  1103 mL (11-14-24 @ 11:22) [Pending Verification By Attending]  Diet, NPO (11-06-24 @ 15:17) [Active]

## 2024-11-14 NOTE — DIETITIAN NUTRITION RISK NOTIFICATION - COMMENTS
Please refer to initial/follow up dietitian note for comprehensive nutrition assessment.  Rolando Mitchell RD (Available on Teams)

## 2024-11-15 ENCOUNTER — APPOINTMENT (OUTPATIENT)
Dept: SURGERY | Facility: CLINIC | Age: 74
End: 2024-11-15

## 2024-11-15 LAB
ANION GAP SERPL CALC-SCNC: 5 MMOL/L — SIGNIFICANT CHANGE UP (ref 5–17)
APTT BLD: 33.4 SEC — SIGNIFICANT CHANGE UP (ref 24.5–35.6)
BUN SERPL-MCNC: 2 MG/DL — LOW (ref 7–18)
CALCIUM SERPL-MCNC: 8.6 MG/DL — SIGNIFICANT CHANGE UP (ref 8.4–10.5)
CHLORIDE SERPL-SCNC: 109 MMOL/L — HIGH (ref 96–108)
CO2 SERPL-SCNC: 29 MMOL/L — SIGNIFICANT CHANGE UP (ref 22–31)
CREAT SERPL-MCNC: 0.74 MG/DL — SIGNIFICANT CHANGE UP (ref 0.5–1.3)
EGFR: 95 ML/MIN/1.73M2 — SIGNIFICANT CHANGE UP
GLUCOSE SERPL-MCNC: 143 MG/DL — HIGH (ref 70–99)
HCT VFR BLD CALC: 37.4 % — LOW (ref 39–50)
HGB BLD-MCNC: 12.8 G/DL — LOW (ref 13–17)
INR BLD: 1.29 RATIO — HIGH (ref 0.85–1.16)
MAGNESIUM SERPL-MCNC: 2.1 MG/DL — SIGNIFICANT CHANGE UP (ref 1.6–2.6)
MCHC RBC-ENTMCNC: 29 PG — SIGNIFICANT CHANGE UP (ref 27–34)
MCHC RBC-ENTMCNC: 34.2 G/DL — SIGNIFICANT CHANGE UP (ref 32–36)
MCV RBC AUTO: 84.6 FL — SIGNIFICANT CHANGE UP (ref 80–100)
NRBC # BLD: 0 /100 WBCS — SIGNIFICANT CHANGE UP (ref 0–0)
PHOSPHATE SERPL-MCNC: 2 MG/DL — LOW (ref 2.5–4.5)
PLATELET # BLD AUTO: 595 K/UL — HIGH (ref 150–400)
POTASSIUM SERPL-MCNC: 4.6 MMOL/L — SIGNIFICANT CHANGE UP (ref 3.5–5.3)
POTASSIUM SERPL-SCNC: 4.6 MMOL/L — SIGNIFICANT CHANGE UP (ref 3.5–5.3)
PROTHROM AB SERPL-ACNC: 14.9 SEC — HIGH (ref 9.9–13.4)
RBC # BLD: 4.42 M/UL — SIGNIFICANT CHANGE UP (ref 4.2–5.8)
RBC # FLD: 14.9 % — HIGH (ref 10.3–14.5)
SODIUM SERPL-SCNC: 143 MMOL/L — SIGNIFICANT CHANGE UP (ref 135–145)
WBC # BLD: 5.63 K/UL — SIGNIFICANT CHANGE UP (ref 3.8–10.5)
WBC # FLD AUTO: 5.63 K/UL — SIGNIFICANT CHANGE UP (ref 3.8–10.5)

## 2024-11-15 PROCEDURE — 36573 INSJ PICC RS&I 5 YR+: CPT

## 2024-11-15 RX ORDER — SODIUM CHLORIDE 9 MG/ML
10 INJECTION, SOLUTION INTRAMUSCULAR; INTRAVENOUS; SUBCUTANEOUS
Refills: 0 | Status: DISCONTINUED | OUTPATIENT
Start: 2024-11-15 | End: 2024-11-27

## 2024-11-15 RX ORDER — CHLORHEXIDINE GLUCONATE 1.2 MG/ML
1 RINSE ORAL DAILY
Refills: 0 | Status: DISCONTINUED | OUTPATIENT
Start: 2024-11-16 | End: 2024-11-27

## 2024-11-15 RX ORDER — HEPARIN SODIUM 5000 [USP'U]/.5ML
30 INJECTION, SOLUTION INTRAVENOUS; SUBCUTANEOUS ONCE
Refills: 0 | Status: COMPLETED | OUTPATIENT
Start: 2024-11-15 | End: 2024-11-15

## 2024-11-15 RX ADMIN — METRONIDAZOLE 100 MILLIGRAM(S): 500 TABLET ORAL at 05:35

## 2024-11-15 RX ADMIN — METOCLOPRAMIDE HYDROCHLORIDE 10 MILLIGRAM(S): 10 TABLET ORAL at 21:09

## 2024-11-15 RX ADMIN — Medication 100 MILLILITER(S): at 23:20

## 2024-11-15 RX ADMIN — METOCLOPRAMIDE HYDROCHLORIDE 10 MILLIGRAM(S): 10 TABLET ORAL at 05:35

## 2024-11-15 RX ADMIN — METRONIDAZOLE 100 MILLIGRAM(S): 500 TABLET ORAL at 14:09

## 2024-11-15 RX ADMIN — ENOXAPARIN SODIUM 40 MILLIGRAM(S): 30 INJECTION SUBCUTANEOUS at 14:09

## 2024-11-15 RX ADMIN — HEPARIN SODIUM 85 MILLIMOLE(S): 5000 INJECTION, SOLUTION INTRAVENOUS; SUBCUTANEOUS at 14:09

## 2024-11-15 RX ADMIN — METOCLOPRAMIDE HYDROCHLORIDE 10 MILLIGRAM(S): 10 TABLET ORAL at 14:10

## 2024-11-15 RX ADMIN — METRONIDAZOLE 100 MILLIGRAM(S): 500 TABLET ORAL at 21:09

## 2024-11-15 RX ADMIN — Medication 100 MILLIGRAM(S): at 08:53

## 2024-11-15 RX ADMIN — PANTOPRAZOLE SODIUM 40 MILLIGRAM(S): 40 TABLET, DELAYED RELEASE ORAL at 14:10

## 2024-11-15 NOTE — PROGRESS NOTE ADULT - SUBJECTIVE AND OBJECTIVE BOX
[facility] NEPHROLOGY- PROGRESS NOTE    Patient is a 74y Male with h/o gastric ulcer (s/p partial gastrectomy and Bilroth II reconstruction in 1970s), VASILIY, HTN , s/p appendectomy (1980s), s/p hydrocele (1990s) recently admitted to [facility] 10/28-11/3 for SBO s/p Ex-lap, SHANON with small bowel resection on 10/28  presents with n/v and abd pain. Pt a/w recurrent SBO and Bacteremia. . Nutrition consulted for TPN.   Repeat BCX 11/8 NG  s/p EGD 11/13 with +Nasojejunal enteral tube placement  However NJ tube kinked and removed on 11/15    Hospital Medications: Medications reviewed.  REVIEW OF SYSTEMS:  CONSTITUTIONAL: No fevers or chills  RESPIRATORY: No shortness of breath  CARDIOVASCULAR: No chest pain.  GASTROINTESTINAL: No nausea, or vomiting,   or abdominal pain. No BM   VASCULAR: No bilateral lower extremity edema.     VITALS:  T(F): 97.9 (11-15-24 @ 12:30), Max: 98.7 (11-14-24 @ 20:40)  HR: 67 (11-15-24 @ 12:30)  BP: 152/86 (11-15-24 @ 12:30)  RR: 18 (11-15-24 @ 12:30)  SpO2: 98% (11-15-24 @ 12:30)  Wt(kg): --    11-14 @ 07:01  -  11-15 @ 07:00  --------------------------------------------------------  IN: 2500 mL / OUT: 3150 mL / NET: -650 mL        PHYSICAL EXAM:  Constitutional: NAD,  HEENT: anicteric sclera, +NGT clamped,    Neck: No JVD  Respiratory: CTA b/l  Cardiovascular: S1, S2, RRR  Gastrointestinal: soft, midline staples +BS  Extremities:  No peripheral edema  Access: Left PICC line- mild bleeding at site contained        LABS:  11-15    143  |  109[H]  |  2[L]  ----------------------------<  143[H]  4.6   |  29  |  0.74    Ca    8.6      15 Nov 2024 06:35  Phos  2.0     11-15  Mg     2.1     11-15    TPro  5.7[L]  /  Alb  2.3[L]  /  TBili  0.3  /  DBili      /  AST  17  /  ALT  23  /  AlkPhos  111  11-14    Creatinine Trend: 0.74 <--, 0.81 <--, 0.68 <--, 0.75 <--, 0.74 <--, 0.76 <--, 0.72 <--                        12.8   5.63  )-----------( 595      ( 15 Nov 2024 06:35 )             37.4     Urine Studies:  Urinalysis Basic - ( 15 Nov 2024 06:35 )    Color:  / Appearance:  / SG:  / pH:   Gluc: 143 mg/dL / Ketone:   / Bili:  / Urobili:    Blood:  / Protein:  / Nitrite:    Leuk Esterase:  / RBC:  / WBC    Sq Epi:  / Non Sq Epi:  / Bacteria:

## 2024-11-15 NOTE — PROGRESS NOTE ADULT - NSPROGADDITIONALINFOA_GEN_ALL_CORE
I have personally seen and examined the patient with surgical team. Agree with the history, physical exam, and plan as documented by the PA.   As in above full notation.  Overnight - failed feeds dobhoff on xray shows kink. Attempted to reposition however not successful. Dobhoff removed.   Vitals non-suggestive.  Wounds clean and abdomen soft non tender. Staples were removed yesterday.   Labs reassuring.  Surgically, stable at present.  To continue current supportive care, nephrology for TPN and IR for PICC line placement.   Reviewed with patient in detail, Surgical team as well as RN's.

## 2024-11-15 NOTE — PROGRESS NOTE ADULT - SUBJECTIVE AND OBJECTIVE BOX
74y Male is under our care for     MEDS:  cefTRIAXone   IVPB 1000 milliGRAM(s) IV Intermittent every 24 hours  metroNIDAZOLE  IVPB 500 milliGRAM(s) IV Intermittent every 8 hours    ALLERGIES: Allergies    No Known Allergies    Intolerances    REVIEW OF SYSTEMS:  [  ] Not able to elicit  General:	  Chest:	  GI:	  :  Skin:	  Musculoskeletal:	  Neuro:	    VITALS:  Vital Signs Last 24 Hrs  T(C): 36.7 (15 Nov 2024 05:46), Max: 37.1 (14 Nov 2024 20:40)  T(F): 98 (15 Nov 2024 05:46), Max: 98.7 (14 Nov 2024 20:40)  HR: 66 (15 Nov 2024 05:46) (65 - 67)  BP: 157/82 (15 Nov 2024 05:46) (138/77 - 157/82)  BP(mean): 104 (14 Nov 2024 20:40) (104 - 104)  RR: 18 (15 Nov 2024 05:46) (18 - 18)  SpO2: 97% (15 Nov 2024 05:46) (96% - 97%)    Parameters below as of 15 Nov 2024 05:46  Patient On (Oxygen Delivery Method): room air    PHYSICAL EXAM:  HEENT:  Neck:  Respiratory:  Cardiovascular:  Gastrointestinal:  :  Extremities:  Skin:  Ortho:  Neuro:    LABS/DIAGNOSTIC TESTS:                        12.8   5.63  )-----------( 595      ( 15 Nov 2024 06:35 )             37.4     WBC Count: 5.63 K/uL (11-15 @ 06:35)  WBC Count: 6.60 K/uL (11-14 @ 06:41)  WBC Count: 8.62 K/uL (11-13 @ 06:50)  WBC Count: 6.74 K/uL (11-12 @ 06:30)  WBC Count: 6.75 K/uL (11-11 @ 06:13)    11-15    143  |  109[H]  |  2[L]  ----------------------------<  143[H]  4.6   |  29  |  0.74    Ca    8.6      15 Nov 2024 06:35  Phos  2.0     11-15  Mg     2.1     11-15    TPro  5.7[L]  /  Alb  2.3[L]  /  TBili  0.3  /  DBili  x   /  AST  17  /  ALT  23  /  AlkPhos  111  11-14    CULTURES:   .Blood BLOOD  11-08 @ 10:30   No growth at 5 days  --  --    .Blood BLOOD  11-06 @ 11:30   Growth in anaerobic bottle: Bacteroides fragilis "Susceptibilities not  performed"  Direct identification is available within approximately 3-5  hours either by Blood Panel Multiplexed PCR or Direct  MALDI-TOF. Details: [email or web address]  --  Blood Culture PCR    .Blood BLOOD  11-06 @ 11:20   Growth in anaerobic bottle: Bacteroides fragilis  "Susceptibilities not performed"  --    Growth in anaerobic bottle: Gram Negative Rods    Clean Catch Clean Catch (Midstream)  10-28 @ 12:30   <10,000 CFU/mL Normal Urogenital Merlene  --  --    RADIOLOGY:  no new studies 74y Male lying in bed and in no acute distress. NGT with bilious output. No BM or flatus. Denies nausea or vomiting. Has not had any fevers and wbc count is normal.     MEDS:  cefTRIAXone   IVPB 1000 milliGRAM(s) IV Intermittent every 24 hours  metroNIDAZOLE  IVPB 500 milliGRAM(s) IV Intermittent every 8 hours    ALLERGIES: Allergies    No Known Allergies    Intolerances    REVIEW OF SYSTEMS:  [  ] Not able to elicit  General: no fevers no malaise  Chest: no cough no sob  GI: no nvd  : no urinary sxs   Skin: no rashes  Musculoskeletal: no trauma no LBP  Neuro: no ha's no dizziness     VITALS:  Vital Signs Last 24 Hrs  T(C): 36.7 (15 Nov 2024 05:46), Max: 37.1 (14 Nov 2024 20:40)  T(F): 98 (15 Nov 2024 05:46), Max: 98.7 (14 Nov 2024 20:40)  HR: 66 (15 Nov 2024 05:46) (65 - 67)  BP: 157/82 (15 Nov 2024 05:46) (138/77 - 157/82)  BP(mean): 104 (14 Nov 2024 20:40) (104 - 104)  RR: 18 (15 Nov 2024 05:46) (18 - 18)  SpO2: 97% (15 Nov 2024 05:46) (96% - 97%)    Parameters below as of 15 Nov 2024 05:46  Patient On (Oxygen Delivery Method): room air    PHYSICAL EXAM:  HEENT: normocephalic, conjunctivae and sclerae clear; moist mucous membranes; NGT  Neck: supple no LN's   Respiratory: lungs clear no rales  Cardiovascular: S1 S2 reg no murmurs  Gastrointestinal: +BS with soft, nondistended abdomen; minimal incisional tenderness  Extremities: no edema  Skin: no rashes  Ortho: no erythema or joint swelling  Neuro: AAO x 3    LABS/DIAGNOSTIC TESTS:                        12.8   5.63  )-----------( 595      ( 15 Nov 2024 06:35 )             37.4     WBC Count: 5.63 K/uL (11-15 @ 06:35)  WBC Count: 6.60 K/uL (11-14 @ 06:41)  WBC Count: 8.62 K/uL (11-13 @ 06:50)  WBC Count: 6.74 K/uL (11-12 @ 06:30)  WBC Count: 6.75 K/uL (11-11 @ 06:13)    11-15    143  |  109[H]  |  2[L]  ----------------------------<  143[H]  4.6   |  29  |  0.74    Ca    8.6      15 Nov 2024 06:35  Phos  2.0     11-15  Mg     2.1     11-15    TPro  5.7[L]  /  Alb  2.3[L]  /  TBili  0.3  /  DBili  x   /  AST  17  /  ALT  23  /  AlkPhos  111  11-14    CULTURES:   .Blood BLOOD  11-08 @ 10:30   No growth at 5 days  --  --    .Blood BLOOD  11-06 @ 11:30   Growth in anaerobic bottle: Bacteroides fragilis "Susceptibilities not  performed"  Direct identification is available within approximately 3-5  hours either by Blood Panel Multiplexed PCR or Direct  MALDI-TOF. Details: [email or web address]  --  Blood Culture PCR    .Blood BLOOD  11-06 @ 11:20   Growth in anaerobic bottle: Bacteroides fragilis  "Susceptibilities not performed"  --    Growth in anaerobic bottle: Gram Negative Rods    Clean Catch Clean Catch (Midstream)  10-28 @ 12:30   <10,000 CFU/mL Normal Urogenital Merlene  --  --    RADIOLOGY:  no new studies 74y Male lying in bed and in no acute distress. NGT with bilious output. No BM or flatus. Denies nausea or vomiting. Has not had any fevers and wbc count is normal.  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 He had a feeding tube and a PICC line placed also.    MEDS:  cefTRIAXone   IVPB 1000 milliGRAM(s) IV Intermittent every 24 hours  metroNIDAZOLE  IVPB 500 milliGRAM(s) IV Intermittent every 8 hours    ALLERGIES: Allergies    No Known Allergies    Intolerances    REVIEW OF SYSTEMS:  [  ] Not able to elicit  General: no fevers no malaise  Chest: no cough no sob  GI: not nauseas or vomiting as he has a NGT in place, not passing flatus as yet  : no urinary sxs   Skin: no rashes  Musculoskeletal: no trauma no LBP  Neuro: no ha's no dizziness     VITALS:  Vital Signs Last 24 Hrs  T(C): 36.7 (15 Nov 2024 05:46), Max: 37.1 (14 Nov 2024 20:40)  T(F): 98 (15 Nov 2024 05:46), Max: 98.7 (14 Nov 2024 20:40)  HR: 66 (15 Nov 2024 05:46) (65 - 67)  BP: 157/82 (15 Nov 2024 05:46) (138/77 - 157/82)  BP(mean): 104 (14 Nov 2024 20:40) (104 - 104)  RR: 18 (15 Nov 2024 05:46) (18 - 18)  SpO2: 97% (15 Nov 2024 05:46) (96% - 97%)    Parameters below as of 15 Nov 2024 05:46  Patient On (Oxygen Delivery Method): room air    PHYSICAL EXAM:  HEENT: normocephalic, conjunctivae and sclerae clear; NGT in place  Neck: supple no LN's   Respiratory: lungs clear ,no rales  Cardiovascular: S1 S2 reg no murmurs  Gastrointestinal: +BS with soft, nondistended abdomen; minimal incisional tenderness  Extremities: no edema  Skin: no rashes  Ortho: no erythema or joint swelling  Neuro: AAO x 3    LABS/DIAGNOSTIC TESTS:                        12.8   5.63  )-----------( 595      ( 15 Nov 2024 06:35 )             37.4     WBC Count: 5.63 K/uL (11-15 @ 06:35)  WBC Count: 6.60 K/uL (11-14 @ 06:41)  WBC Count: 8.62 K/uL (11-13 @ 06:50)  WBC Count: 6.74 K/uL (11-12 @ 06:30)  WBC Count: 6.75 K/uL (11-11 @ 06:13)    11-15    143  |  109[H]  |  2[L]  ----------------------------<  143[H]  4.6   |  29  |  0.74    Ca    8.6      15 Nov 2024 06:35  Phos  2.0     11-15  Mg     2.1     11-15    TPro  5.7[L]  /  Alb  2.3[L]  /  TBili  0.3  /  DBili  x   /  AST  17  /  ALT  23  /  AlkPhos  111  11-14    CULTURES:   .Blood BLOOD  11-08 @ 10:30   No growth at 5 days  --  --    .Blood BLOOD  11-06 @ 11:30   Growth in anaerobic bottle: Bacteroides fragilis "Susceptibilities not  performed"  Direct identification is available within approximately 3-5  hours either by Blood Panel Multiplexed PCR or Direct  MALDI-TOF. Details: [email or web address]  --  Blood Culture PCR    .Blood BLOOD  11-06 @ 11:20   Growth in anaerobic bottle: Bacteroides fragilis  "Susceptibilities not performed"  --    Growth in anaerobic bottle: Gram Negative Rods      RADIOLOGY:   74y Male lying in bed and in no acute distress. NGT with bilious output. No BM or flatus. Denies nausea or vomiting. Has not had any fevers and wbc count is normal.  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 He had a nasal Jejunostomy tube and a PICC line placed also, in addition to [place] Sump. He will be starting TPN.    MEDS:  cefTRIAXone   IVPB 1000 milliGRAM(s) IV Intermittent every 24 hours  metroNIDAZOLE  IVPB 500 milliGRAM(s) IV Intermittent every 8 hours    ALLERGIES: Allergies    No Known Allergies    Intolerances    REVIEW OF SYSTEMS:  [  ] Not able to elicit  General: no fevers no malaise  Chest: no cough no sob  GI: not nauseas or vomiting as he has a NGT in place, not passing flatus as yet  : no urinary sxs   Skin: no rashes  Musculoskeletal: no trauma no LBP  Neuro: no ha's no dizziness     VITALS:  Vital Signs Last 24 Hrs  T(C): 36.7 (15 Nov 2024 05:46), Max: 37.1 (14 Nov 2024 20:40)  T(F): 98 (15 Nov 2024 05:46), Max: 98.7 (14 Nov 2024 20:40)  HR: 66 (15 Nov 2024 05:46) (65 - 67)  BP: 157/82 (15 Nov 2024 05:46) (138/77 - 157/82)  BP(mean): 104 (14 Nov 2024 20:40) (104 - 104)  RR: 18 (15 Nov 2024 05:46) (18 - 18)  SpO2: 97% (15 Nov 2024 05:46) (96% - 97%)    Parameters below as of 15 Nov 2024 05:46  Patient On (Oxygen Delivery Method): room air    PHYSICAL EXAM:  HEENT: normocephalic, conjunctivae and sclerae clear; NGT in place  Neck: supple no LN's   Respiratory: lungs clear ,no rales  Cardiovascular: S1 S2 reg no murmurs  Gastrointestinal: +BS with soft, nondistended abdomen; minimal incisional tenderness  Extremities: no edema  Skin: no rashes  Ortho: no erythema or joint swelling  Neuro: AAO x 3    LABS/DIAGNOSTIC TESTS:                        12.8   5.63  )-----------( 595      ( 15 Nov 2024 06:35 )             37.4     WBC Count: 5.63 K/uL (11-15 @ 06:35)  WBC Count: 6.60 K/uL (11-14 @ 06:41)  WBC Count: 8.62 K/uL (11-13 @ 06:50)  WBC Count: 6.74 K/uL (11-12 @ 06:30)  WBC Count: 6.75 K/uL (11-11 @ 06:13)    11-15    143  |  109[H]  |  2[L]  ----------------------------<  143[H]  4.6   |  29  |  0.74    Ca    8.6      15 Nov 2024 06:35  Phos  2.0     11-15  Mg     2.1     11-15    TPro  5.7[L]  /  Alb  2.3[L]  /  TBili  0.3  /  DBili  x   /  AST  17  /  ALT  23  /  AlkPhos  111  11-14    CULTURES:   .Blood BLOOD  11-08 @ 10:30   No growth at 5 days  --  --    .Blood BLOOD  11-06 @ 11:30   Growth in anaerobic bottle: Bacteroides fragilis "Susceptibilities not  performed"  Direct identification is available within approximately 3-5  hours either by Blood Panel Multiplexed PCR or Direct  MALDI-TOF. Details: [email or web address]  --  Blood Culture PCR    .Blood BLOOD  11-06 @ 11:20   Growth in anaerobic bottle: Bacteroides fragilis  "Susceptibilities not performed"  --    Growth in anaerobic bottle: Gram Negative Rods      RADIOLOGY:   74y Male lying in bed and in no acute distress. NGT with bilious output. No BM or flatus. Denies nausea or vomiting. Has not had any fevers and wbc count is normal.  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 He had a nasal NGT tube and a PICC line placed also. He will be starting TPN.    MEDS:  cefTRIAXone   IVPB 1000 milliGRAM(s) IV Intermittent every 24 hours  metroNIDAZOLE  IVPB 500 milliGRAM(s) IV Intermittent every 8 hours    ALLERGIES: Allergies    No Known Allergies    Intolerances    REVIEW OF SYSTEMS:  [  ] Not able to elicit  General: no fevers no malaise  Chest: no cough no sob  GI: not nauseas or vomiting as he has a NGT in place, not passing flatus as yet  : no urinary sxs   Skin: no rashes  Musculoskeletal: no trauma no LBP  Neuro: no ha's no dizziness     VITALS:  Vital Signs Last 24 Hrs  T(C): 36.7 (15 Nov 2024 05:46), Max: 37.1 (14 Nov 2024 20:40)  T(F): 98 (15 Nov 2024 05:46), Max: 98.7 (14 Nov 2024 20:40)  HR: 66 (15 Nov 2024 05:46) (65 - 67)  BP: 157/82 (15 Nov 2024 05:46) (138/77 - 157/82)  BP(mean): 104 (14 Nov 2024 20:40) (104 - 104)  RR: 18 (15 Nov 2024 05:46) (18 - 18)  SpO2: 97% (15 Nov 2024 05:46) (96% - 97%)    Parameters below as of 15 Nov 2024 05:46  Patient On (Oxygen Delivery Method): room air    PHYSICAL EXAM:  HEENT: normocephalic, conjunctivae and sclerae clear; NGT in place  Neck: supple no LN's   Respiratory: lungs clear ,no rales  Cardiovascular: S1 S2 reg no murmurs  Gastrointestinal: +BS with soft, nondistended abdomen; minimal incisional tenderness  Extremities: no edema  Skin: no rashes  Ortho: no erythema or joint swelling  Neuro: AAO x 3    LABS/DIAGNOSTIC TESTS:                        12.8   5.63  )-----------( 595      ( 15 Nov 2024 06:35 )             37.4     WBC Count: 5.63 K/uL (11-15 @ 06:35)  WBC Count: 6.60 K/uL (11-14 @ 06:41)  WBC Count: 8.62 K/uL (11-13 @ 06:50)  WBC Count: 6.74 K/uL (11-12 @ 06:30)  WBC Count: 6.75 K/uL (11-11 @ 06:13)    11-15    143  |  109[H]  |  2[L]  ----------------------------<  143[H]  4.6   |  29  |  0.74    Ca    8.6      15 Nov 2024 06:35  Phos  2.0     11-15  Mg     2.1     11-15    TPro  5.7[L]  /  Alb  2.3[L]  /  TBili  0.3  /  DBili  x   /  AST  17  /  ALT  23  /  AlkPhos  111  11-14    CULTURES:   .Blood BLOOD  11-08 @ 10:30   No growth at 5 days  --  --    .Blood BLOOD  11-06 @ 11:30   Growth in anaerobic bottle: Bacteroides fragilis "Susceptibilities not  performed"  Direct identification is available within approximately 3-5  hours either by Blood Panel Multiplexed PCR or Direct  MALDI-TOF. Details: [email or web address]  --  Blood Culture PCR    .Blood BLOOD  11-06 @ 11:20   Growth in anaerobic bottle: Bacteroides fragilis  "Susceptibilities not performed"  --    Growth in anaerobic bottle: Gram Negative Rods      RADIOLOGY:

## 2024-11-15 NOTE — PROGRESS NOTE ADULT - ASSESSMENT
Bacteremia - with Bacteroides  Leukocytosis  Enteritis  SBO    Plan -   ·	Cont Rocephin 1gm iv qd  ·	Cont Flagyl 500mgs iv q8hrs  ·	repeat blood cultures negative to date Bacteremia - with Bacteroides  Leukocytosis  Enteritis  SBO    Plan -   ·	Cont Rocephin 1gm iv qd  ·	Cont Flagyl 500mgs iv q8hrs (total of 14 days)  ·	repeat blood cultures negative to date Bacteremia - with Bacteroides  Leukocytosis - normalized  SBO    Plan -   ·	Cont Rocephin 1gm iv qd  ·	Cont Flagyl 500mgs iv q8hrs (total of 14 days)  ·	repeat blood cultures negative to date

## 2024-11-15 NOTE — PROGRESS NOTE ADULT - SUBJECTIVE AND OBJECTIVE BOX
Patient seen and examined at the bedside. DHT is dysfunctional since 6 pm.  Afebrile, VSS.  Denies nausea, vomiting.  No stool or flatus yet.  NG remains on suction with bilious 650 cc output. Mobilized well.     Vital Signs Last 24 Hrs  T(C): 36.7 (15 Nov 2024 05:46), Max: 37.1 (14 Nov 2024 20:40)  T(F): 98 (15 Nov 2024 05:46), Max: 98.7 (14 Nov 2024 20:40)  HR: 66 (15 Nov 2024 05:46) (65 - 67)  BP: 157/82 (15 Nov 2024 05:46) (138/77 - 157/82)  BP(mean): 104 (14 Nov 2024 20:40) (104 - 104)  RR: 18 (15 Nov 2024 05:46) (18 - 18)  SpO2: 97% (15 Nov 2024 05:46) (96% - 97%)    Parameters below as of 15 Nov 2024 05:46  Patient On (Oxygen Delivery Method): room air    I&O's Detail    14 Nov 2024 07:01  -  15 Nov 2024 07:00  --------------------------------------------------------  IN:    dextrose 5% + sodium chloride 0.45% w/ Additives: 2300 mL    IV PiggyBack: 200 mL  Total IN: 2500 mL    OUT:    Nasogastric/Oral tube (mL): 650 mL    Oral Fluid: 0 mL    Voided (mL): 2500 mL  Total OUT: 3150 mL    Total NET: -650 mL    Physical:   GA: AAOx3, NAD, NG in place, bilious output  CVS: RRR  Pulm: nonlabored  Abd: soft, nondistended, minimal incisional tenderness. incision with staples, well appearing    MEDICATIONS  (STANDING):  benzocaine/menthol Lozenge 1 Lozenge Oral once  cefTRIAXone   IVPB 1000 milliGRAM(s) IV Intermittent every 24 hours  dextrose 5% + sodium chloride 0.45% with potassium chloride 20 mEq/L 1000 milliLiter(s) (100 mL/Hr) IV Continuous <Continuous>  diatrizoate meglumine/diatrizoate sodium. 60 milliLiter(s) Oral once  enoxaparin Injectable 40 milliGRAM(s) SubCutaneous every 24 hours  metoclopramide Injectable 10 milliGRAM(s) IV Push every 8 hours  metroNIDAZOLE  IVPB 500 milliGRAM(s) IV Intermittent every 8 hours  pantoprazole  Injectable 40 milliGRAM(s) IV Push every 24 hours    MEDICATIONS  (PRN):  hydrALAZINE Injectable 10 milliGRAM(s) IV Push every 6 hours PRN systolic BP > 160  ondansetron Injectable 4 milliGRAM(s) IV Push every 6 hours PRN Nausea                          12.8   5.63  )-----------( 595      ( 15 Nov 2024 06:35 )             37.4     11-15    143  |  109[H]  |  x   ----------------------------<  143[H]  4.6   |  x   |  x     Ca    8.6      15 Nov 2024 06:35  Phos  2.0     11-15  Mg     2.1     11-15    TPro  5.7[L]  /  Alb  2.3[L]  /  TBili  0.3  /  DBili  x   /  AST  17  /  ALT  23  /  AlkPhos  111  11-14

## 2024-11-15 NOTE — CHART NOTE - NSCHARTNOTEFT_GEN_A_CORE
Pt seen for TPN follow up     Pt discussed at rounds on 11/15. Pt's tube feeding dysfunctional since 6pm on 11/14 per surgery. Denies N/V. No stool or flatus yet. NG remains on suction with bilious 650 cc output. Pt noted with severe malnutrition, ~5% wt loss in 1 week, NPO >1 week. (pt states he has not eaten in 2 weeks) per MD. Pt is TPN appropriate per nephrology, PICC line insertion on 11/15, placed for TPN administration per PA note. Monitor pt's hydration, I/O's, POCT, LFT's, triglycerides, electrolytes to prevent re-feeding syndrome.    Nutrient Requirements per nephrology: Weight 64.4 kg  Total Kcal 1800  Fat 50g = 500 kcal  Protein 78g = 312 kcal  Dextrose 290g = 988 kcal      OUT:    Nasogastric/Oral tube (mL): 650 mL    Oral Fluid: 0 mL    Voided (mL): 2500 mL  Total OUT: 3150 mL    Total NET: -650 mL      Diet Prescription: Diet, NPO (11-06-24 @ 15:17)    Pertinent Medications: MEDICATIONS  (STANDING):  benzocaine/menthol Lozenge 1 Lozenge Oral once  cefTRIAXone   IVPB 1000 milliGRAM(s) IV Intermittent every 24 hours  dextrose 5% + sodium chloride 0.45% with potassium chloride 20 mEq/L 1000 milliLiter(s) (100 mL/Hr) IV Continuous <Continuous>  diatrizoate meglumine/diatrizoate sodium. 60 milliLiter(s) Oral once  enoxaparin Injectable 40 milliGRAM(s) SubCutaneous every 24 hours  metoclopramide Injectable 10 milliGRAM(s) IV Push every 8 hours  metroNIDAZOLE  IVPB 500 milliGRAM(s) IV Intermittent every 8 hours  pantoprazole  Injectable 40 milliGRAM(s) IV Push every 24 hours  sodium phosphate 30 milliMole(s)/500 mL IVPB 30 milliMole(s) IV Intermittent once    MEDICATIONS  (PRN):  hydrALAZINE Injectable 10 milliGRAM(s) IV Push every 6 hours PRN systolic BP > 160  ondansetron Injectable 4 milliGRAM(s) IV Push every 6 hours PRN Nausea  sodium chloride 0.9% lock flush 10 milliLiter(s) IV Push every 1 hour PRN Pre/post blood products, medications, blood draw, and to maintain line patency    Pertinent Labs: 11-15 Na143 mmol/L Glu 143 mg/dL[H] K+ 4.6 mmol/L Cr  0.74 mg/dL BUN 2 mg/dL[L] 11-15 Phos 2.0 mg/dL[L] 11-14 Alb 2.3 g/dL[L] 11-14 Chol --    LDL --    HDL --    Trig 122 mg/dL     CAPILLARY BLOOD GLUCOSE          Weight: 142 lbs  Height: 66 in   IBW: 142 lbs +/-10%  BMI: 22.9 kg/m^2    Physical Assessment, per flowsheets:  Edema: No edema noted per RN flowsheets   Pressure Injury: No pressure injuries noted per RN flowsheets     Estimated Needs:   [X] recalculated, with consideration for TPN, based on CBW: 64.4kg  Kcal Recommendation: 1610-1932kcal based on 25-30kcal/kg   Protein Recommendation: 77 - 90g pro based on 1.2-1.4g pro/kg    Previous Nutrition Diagnosis:  [x] Malnutrition   Nutrition Diagnosis is [x] ongoing   New Nutrition Diagnosis: Altered GI function related to intestinal obstruction as evidenced by TPN initiation, N/V, no BM.     Rolando Mitchell RD (Available on teams) Pt seen for TPN follow up     Pt discussed at rounds on 11/15. Pt's DHT dysfunctional since 6pm on 11/14 per surgery. Denies N/V. No stool or flatus yet. NG remains on suction with bilious 650 cc output. Pt noted with severe malnutrition, ~5% wt loss in 1 week, NPO >1 week. (pt states he has not eaten in 2 weeks) per MD. Pt is TPN appropriate per Nutrition Support Doctor, PICC line insertion on 11/15, placed for TPN administration per PA. Pending TPN order, interdisciplinary team aware. Noted with hypophosphatemia (2). Glucose 123 - 11/14, HbA1c - 5.9% - 11/14, Triglycerides - 122    Nutrient Requirements per Nutrition Support Doctor: Weight 64.4 kg  Total Kcal 1800  Fat 50g = 500 kcal  Protein 78g = 312 kcal  Dextrose 290g = 988 kcal    Per MD:  OUT:    Nasogastric/Oral tube (mL): 650 mL    Oral Fluid: 0 mL    Voided (mL): 2500 mL  Total OUT: 3150 mL    Total NET: -650 mL      Diet Prescription: Diet, NPO (11-06-24 @ 15:17)    Pertinent Medications: MEDICATIONS  (STANDING):  benzocaine/menthol Lozenge 1 Lozenge Oral once  cefTRIAXone   IVPB 1000 milliGRAM(s) IV Intermittent every 24 hours  dextrose 5% + sodium chloride 0.45% with potassium chloride 20 mEq/L 1000 milliLiter(s) (100 mL/Hr) IV Continuous <Continuous>  diatrizoate meglumine/diatrizoate sodium. 60 milliLiter(s) Oral once  enoxaparin Injectable 40 milliGRAM(s) SubCutaneous every 24 hours  metoclopramide Injectable 10 milliGRAM(s) IV Push every 8 hours  metroNIDAZOLE  IVPB 500 milliGRAM(s) IV Intermittent every 8 hours  pantoprazole  Injectable 40 milliGRAM(s) IV Push every 24 hours  sodium phosphate 30 milliMole(s)/500 mL IVPB 30 milliMole(s) IV Intermittent once    MEDICATIONS  (PRN):  hydrALAZINE Injectable 10 milliGRAM(s) IV Push every 6 hours PRN systolic BP > 160  ondansetron Injectable 4 milliGRAM(s) IV Push every 6 hours PRN Nausea  sodium chloride 0.9% lock flush 10 milliLiter(s) IV Push every 1 hour PRN Pre/post blood products, medications, blood draw, and to maintain line patency    Pertinent Labs: 11-15 Na143 mmol/L Glu 143 mg/dL[H] K+ 4.6 mmol/L Cr  0.74 mg/dL BUN 2 mg/dL[L] 11-15 Phos 2.0 mg/dL[L] 11-14 Alb 2.3 g/dL[L] 11-14 Chol --    LDL --    HDL --    Trig 122 mg/dL     CAPILLARY BLOOD GLUCOSE    Weight: 142 lbs  Height: 66 in   IBW: 142 lbs +/-10%  BMI: 22.9 kg/m^2    Physical Assessment, per flowsheets:  Edema: No edema noted per RN flowsheets   Pressure Injury: No pressure injuries noted per RN flowsheets     Estimated nutrition needs: [x] No change     Previous Nutrition Diagnosis:  [x] Malnutrition   Nutrition Diagnosis is [x] ongoing   New Nutrition Diagnosis: Altered GI function related to intestinal obstruction as evidenced by TPN initiation, N/V, no BM.     Monitor & Evaluate:  1) Pending TPN order, pharmacy aware, team aware.  2) Monitor TPN rate, pt meeting goal rate.  3) Monitor pt's hydration, I/O's, POCT, LFT's, triglycerides, electrolytes to prevent re-feeding syndrome.  4) RD to remain available.     Rolando Mitchell RD (Available on teams)

## 2024-11-15 NOTE — PROGRESS NOTE ADULT - ASSESSMENT
74M  s/p exlap lysis of adhesions, small bowel resection readmitted with recurrent SBO.  s/p EGD on 11/13; stricture was found which was dilated, feeding tube passed through that portion, NGT replaced      -NPO, NGT to LIS  -Troubleshooting on DHT today vs TPN  -Ambulate, OOBTC, IS  -Pain control prn  -Continue Lovenox  -Monitor bowel function  -CTX/Flagyl, f/u blood cultures (NGT on last one), f/u ID recs

## 2024-11-15 NOTE — PROGRESS NOTE ADULT - ASSESSMENT
Patient is a 74y Male with h/o gastric ulcer (s/p partial gastrectomy and Bilroth II reconstruction in 1970s), VASILIY, HTN , s/p appendectomy (1980s), s/p hydrocele (1990s) recently admitted to [facility] 10/28-11/3 for SBO s/p Ex-lap, SHANON with small bowel resection on 10/28  presents with n/v and abd pain. Pt a/w recurrent SBO and Bacteremia. . Nutrition consulted for TPN.   Repeat BCX 11/8 NG  s/p EGD 11/13 with +Nasojejunal enteral tube placement      1. Severe PCM- ~5% wt loss in 1 week, NPO >1 week. (pt states he has not eaten in 2 weeks).  Naso-jejunal tube removed; Surgery requesting TPN. BCx 11/8- NG. s/p IR PICC placed today 11/15  Will give TPN with lipids @ 1L starting 11/16 (1/2 dose on first day), Will d/c IVF once TPN is intiated.   Check FS 15 min and 1hr after starting TPN and then q6h thereafter.   hgbA1C 5.9 on 11/14   Triglycerides 122.  Check CMP/Mg/Phos/ Ionized calcium in am.   Daily weights. Strict I/O. Hold TPN if pt spikes fever and check BCX x2    2. Hypophosphatemia- Pt given Naphos IV x1. Monitor K/Mg/ Phos daily.   3. Bacteremia/ Enteritis- Pt on Cefepime/ Flagyl. BCx+ 11/ 6- Bacteroides fragilis. Repeat BCx 11/8 NG. ID following  4. Essential HTN- BP acceptable. Pt on Hydralazine 10mg IV q 6hr prn SBP >160. Monitor BP  5. SBO- plan as per surgery    Discussed with surgical team.     TPN orders    Weight 64.4 kg  Total Kcal 1800  Fat 50g = 500 kcal  Protein 78g = 312 kcal  Dextrose 290g = 988 kcal    [facility] NEPHROLOGY  Jose Angel Horner M.D.  Caden Heck D.O.  Amalia Allen M.D.  MD Eugenia Cazares, MSN, ANP-C    Telephone: [phone]  Facsimile: [phone]    [address] [place] [address], [address]  [place] [address]

## 2024-11-16 LAB
ALBUMIN SERPL ELPH-MCNC: 2.6 G/DL — LOW (ref 3.5–5)
ALP SERPL-CCNC: 111 U/L — SIGNIFICANT CHANGE UP (ref 40–120)
ALT FLD-CCNC: 28 U/L DA — SIGNIFICANT CHANGE UP (ref 10–60)
ANION GAP SERPL CALC-SCNC: 4 MMOL/L — LOW (ref 5–17)
AST SERPL-CCNC: 35 U/L — SIGNIFICANT CHANGE UP (ref 10–40)
BILIRUB SERPL-MCNC: 0.5 MG/DL — SIGNIFICANT CHANGE UP (ref 0.2–1.2)
BUN SERPL-MCNC: 2 MG/DL — LOW (ref 7–18)
CALCIUM SERPL-MCNC: 8.4 MG/DL — SIGNIFICANT CHANGE UP (ref 8.4–10.5)
CHLORIDE SERPL-SCNC: 108 MMOL/L — SIGNIFICANT CHANGE UP (ref 96–108)
CO2 SERPL-SCNC: 30 MMOL/L — SIGNIFICANT CHANGE UP (ref 22–31)
CREAT SERPL-MCNC: 0.79 MG/DL — SIGNIFICANT CHANGE UP (ref 0.5–1.3)
EGFR: 93 ML/MIN/1.73M2 — SIGNIFICANT CHANGE UP
GLUCOSE BLDC GLUCOMTR-MCNC: 120 MG/DL — HIGH (ref 70–99)
GLUCOSE BLDC GLUCOMTR-MCNC: 124 MG/DL — HIGH (ref 70–99)
GLUCOSE BLDC GLUCOMTR-MCNC: 133 MG/DL — HIGH (ref 70–99)
GLUCOSE SERPL-MCNC: 166 MG/DL — HIGH (ref 70–99)
HCT VFR BLD CALC: 37.1 % — LOW (ref 39–50)
HGB BLD-MCNC: 12.4 G/DL — LOW (ref 13–17)
MAGNESIUM SERPL-MCNC: 2 MG/DL — SIGNIFICANT CHANGE UP (ref 1.6–2.6)
MCHC RBC-ENTMCNC: 27.9 PG — SIGNIFICANT CHANGE UP (ref 27–34)
MCHC RBC-ENTMCNC: 33.4 G/DL — SIGNIFICANT CHANGE UP (ref 32–36)
MCV RBC AUTO: 83.4 FL — SIGNIFICANT CHANGE UP (ref 80–100)
NRBC # BLD: 0 /100 WBCS — SIGNIFICANT CHANGE UP (ref 0–0)
PHOSPHATE SERPL-MCNC: 2.3 MG/DL — LOW (ref 2.5–4.5)
PLATELET # BLD AUTO: 480 K/UL — HIGH (ref 150–400)
POTASSIUM SERPL-MCNC: 4 MMOL/L — SIGNIFICANT CHANGE UP (ref 3.5–5.3)
POTASSIUM SERPL-SCNC: 4 MMOL/L — SIGNIFICANT CHANGE UP (ref 3.5–5.3)
PROT SERPL-MCNC: 5.9 G/DL — LOW (ref 6–8.3)
RBC # BLD: 4.45 M/UL — SIGNIFICANT CHANGE UP (ref 4.2–5.8)
RBC # FLD: 15.1 % — HIGH (ref 10.3–14.5)
SODIUM SERPL-SCNC: 142 MMOL/L — SIGNIFICANT CHANGE UP (ref 135–145)
WBC # BLD: 5.21 K/UL — SIGNIFICANT CHANGE UP (ref 3.8–10.5)
WBC # FLD AUTO: 5.21 K/UL — SIGNIFICANT CHANGE UP (ref 3.8–10.5)

## 2024-11-16 RX ORDER — SODIUM/POT/MAG/CALC/CHLOR/ACET 35-20-5MEQ
1 VIAL (ML) INTRAVENOUS
Refills: 0 | Status: DISCONTINUED | OUTPATIENT
Start: 2024-11-16 | End: 2024-11-17

## 2024-11-16 RX ORDER — GLUCAGON INJECTION, SOLUTION 0.5 MG/.1ML
1 INJECTION, SOLUTION SUBCUTANEOUS ONCE
Refills: 0 | Status: DISCONTINUED | OUTPATIENT
Start: 2024-11-16 | End: 2024-11-27

## 2024-11-16 RX ORDER — FAT EMULSIONS 20 %
0.39 EMULSION INTRAVENOUS
Qty: 25 | Refills: 0 | Status: DISCONTINUED | OUTPATIENT
Start: 2024-11-16 | End: 2024-11-16

## 2024-11-16 RX ORDER — 0.9 % SODIUM CHLORIDE 0.9 %
1000 INTRAVENOUS SOLUTION INTRAVENOUS
Refills: 0 | Status: DISCONTINUED | OUTPATIENT
Start: 2024-11-16 | End: 2024-11-27

## 2024-11-16 RX ORDER — FAT EMULSIONS 20 %
0.39 EMULSION INTRAVENOUS
Qty: 25 | Refills: 0 | Status: DISCONTINUED | OUTPATIENT
Start: 2024-11-16 | End: 2024-11-17

## 2024-11-16 RX ORDER — SODIUM/POT/MAG/CALC/CHLOR/ACET 35-20-5MEQ
1 VIAL (ML) INTRAVENOUS
Refills: 0 | Status: DISCONTINUED | OUTPATIENT
Start: 2024-11-16 | End: 2024-11-16

## 2024-11-16 RX ADMIN — ENOXAPARIN SODIUM 40 MILLIGRAM(S): 30 INJECTION SUBCUTANEOUS at 15:17

## 2024-11-16 RX ADMIN — CHLORHEXIDINE GLUCONATE 1 APPLICATION(S): 1.2 RINSE ORAL at 15:16

## 2024-11-16 RX ADMIN — METRONIDAZOLE 100 MILLIGRAM(S): 500 TABLET ORAL at 05:44

## 2024-11-16 RX ADMIN — METOCLOPRAMIDE HYDROCHLORIDE 10 MILLIGRAM(S): 10 TABLET ORAL at 22:01

## 2024-11-16 RX ADMIN — Medication 1 EACH: at 22:06

## 2024-11-16 RX ADMIN — PANTOPRAZOLE SODIUM 40 MILLIGRAM(S): 40 TABLET, DELAYED RELEASE ORAL at 15:17

## 2024-11-16 RX ADMIN — METRONIDAZOLE 100 MILLIGRAM(S): 500 TABLET ORAL at 22:00

## 2024-11-16 RX ADMIN — METRONIDAZOLE 100 MILLIGRAM(S): 500 TABLET ORAL at 15:17

## 2024-11-16 RX ADMIN — Medication 100 MILLIGRAM(S): at 09:38

## 2024-11-16 RX ADMIN — METOCLOPRAMIDE HYDROCHLORIDE 10 MILLIGRAM(S): 10 TABLET ORAL at 15:18

## 2024-11-16 RX ADMIN — Medication 5.21 GM/KG/DAY: at 22:06

## 2024-11-16 RX ADMIN — METOCLOPRAMIDE HYDROCHLORIDE 10 MILLIGRAM(S): 10 TABLET ORAL at 05:44

## 2024-11-16 NOTE — PROGRESS NOTE ADULT - SUBJECTIVE AND OBJECTIVE BOX
INTERVAL HPI/OVERNIGHT EVENTS:  Pt seen and examined overnight. Patient without acute complaints this morning. No gas or BM yesterday. PICC line placed in LE yesterday.     Vital Signs Last 24 Hrs  T(C): 36.7 (16 Nov 2024 05:09), Max: 36.8 (15 Nov 2024 20:28)  T(F): 98.1 (16 Nov 2024 05:09), Max: 98.2 (15 Nov 2024 20:28)  HR: 58 (16 Nov 2024 05:09) (58 - 69)  BP: 166/77 (16 Nov 2024 05:09) (144/84 - 166/77)  BP(mean): --  RR: 18 (16 Nov 2024 05:09) (18 - 18)  SpO2: 97% (16 Nov 2024 05:09) (95% - 98%)    Parameters below as of 16 Nov 2024 05:09  Patient On (Oxygen Delivery Method): room air      I&O's Detail    15 Nov 2024 07:01  -  16 Nov 2024 07:00  --------------------------------------------------------  IN:  Total IN: 0 mL    OUT:    Nasogastric/Oral tube (mL): 350 mL    Voided (mL): 850 mL  Total OUT: 1200 mL    Total NET: -1200 mL          Medications:  cefTRIAXone   IVPB 1000 milliGRAM(s) IV Intermittent every 24 hours  diatrizoate meglumine/diatrizoate sodium. 60 milliLiter(s) Oral once  metroNIDAZOLE  IVPB 500 milliGRAM(s) IV Intermittent every 8 hours  pantoprazole  Injectable 40 milliGRAM(s) IV Push every 24 hours      Physical Exam:  General: AAOx3, No acute distress  HEENT: NC/AT, trachea midline  Respiratory: Nonlabored breathing, equal chest rise b/l   Abdomen: soft,  nondistended, nontender, no rebound tenderness, no guarding, no palpable masses; midline incision with steristrips in place c/d/i  Tubes: ngt in place       Drains/Tubes:     11-15-24 @ 07:01  -  11-16-24 @ 07:00  --------------------------------------------------------  IN: 0 mL / OUT: 1200 mL / NET: -1200 mL        Labs:                        12.8   5.63  )-----------( 595      ( 15 Nov 2024 06:35 )             37.4     11-15    143  |  109[H]  |  2[L]  ----------------------------<  143[H]  4.6   |  29  |  0.74    Ca    8.6      15 Nov 2024 06:35  Phos  2.0     11-15  Mg     2.1     11-15      PT/INR - ( 15 Nov 2024 06:35 )   PT: 14.9 sec;   INR: 1.29 ratio         PTT - ( 15 Nov 2024 06:35 )  PTT:33.4 sec

## 2024-11-16 NOTE — PROGRESS NOTE ADULT - SUBJECTIVE AND OBJECTIVE BOX
[facility] NEPHROLOGY- PROGRESS NOTE    Patient is a 74y Male with h/o gastric ulcer (s/p partial gastrectomy and Bilroth II reconstruction in 1970s), VASILIY, HTN , s/p appendectomy (1980s), s/p hydrocele (1990s) recently admitted to [facility] 10/28-11/3 for SBO s/p Ex-lap, SHANON with small bowel resection on 10/28  presents with n/v and abd pain. Pt a/w recurrent SBO and Bacteremia. . Nutrition consulted for TPN.   Repeat BCX 11/8 NG  s/p EGD 11/13 with +Nasojejunal enteral tube placement  However NJ tube kinked and removed on 11/15.   s/p IR LUE PICC placed on 11/15    Hospital Medications: Medications reviewed.  REVIEW OF SYSTEMS:  CONSTITUTIONAL: No fevers or chills  RESPIRATORY: No shortness of breath  CARDIOVASCULAR: No chest pain.  GASTROINTESTINAL: No nausea, or vomiting, or abdominal pain. No BM or flatulence  VASCULAR: No bilateral lower extremity edema.     VITALS:  T(F): 98 (11-16-24 @ 13:11), Max: 98.2 (11-15-24 @ 20:28)  HR: 72 (11-16-24 @ 13:11)  BP: 128/78 (11-16-24 @ 13:11)  RR: 18 (11-16-24 @ 13:11)  SpO2: 97% (11-16-24 @ 13:11)  Wt(kg): --    11-15 @ 07:01  -  11-16 @ 07:00  --------------------------------------------------------  IN: 0 mL / OUT: 1200 mL / NET: -1200 mL        PHYSICAL EXAM:  Constitutional: NAD,  HEENT: anicteric sclera, +NGT to sucktion,    Neck: No JVD  Respiratory: CTA b/l  Cardiovascular: S1, S2, RRR  Gastrointestinal: soft, midline staples    Extremities:  No peripheral edema  Access: Left PICC line- intact    LABS:  11-16    142  |  108  |  2[L]  ----------------------------<  166[H]  4.0   |  30  |  0.79    Ca    8.4      16 Nov 2024 08:15  Phos  2.3     11-16  Mg     2.0     11-16    TPro  5.9[L]  /  Alb  2.6[L]  /  TBili  0.5  /  DBili      /  AST  35  /  ALT  28  /  AlkPhos  111  11-16    Creatinine Trend: 0.79 <--, 0.74 <--, 0.81 <--, 0.68 <--, 0.75 <--, 0.74 <--, 0.76 <--                        12.4   5.21  )-----------( 480      ( 16 Nov 2024 08:15 )             37.1     Urine Studies:  Urinalysis Basic - ( 16 Nov 2024 08:15 )    Color:  / Appearance:  / SG:  / pH:   Gluc: 166 mg/dL / Ketone:   / Bili:  / Urobili:    Blood:  / Protein:  / Nitrite:    Leuk Esterase:  / RBC:  / WBC    Sq Epi:  / Non Sq Epi:  / Bacteria:

## 2024-11-16 NOTE — PROGRESS NOTE ADULT - ASSESSMENT
Bacteremia - with Bacteroides  SBO    Plan -   ·	Cont Rocephin 1gm iv qd  ·	Cont Flagyl 500mgs iv q8hrs D11, needs 3 days more.

## 2024-11-16 NOTE — PROGRESS NOTE ADULT - ASSESSMENT
Patient is a 74y Male with h/o gastric ulcer (s/p partial gastrectomy and Bilroth II reconstruction in 1970s), VASILIY, HTN , s/p appendectomy (1980s), s/p hydrocele (1990s) [facility] s/p Ex-lap, SHANON with small bowel resection on 10/28  presents with n/v and abd pain. Pt a/w recurrent SBO and Bacteremia. . Nutrition consulted for TPN.   Repeat BCX 11/8 NG  s/p EGD 11/13 with +Nasojejunal enteral tube placement    1. Severe PCM- ~5% wt loss in 1 week, NPO >1 week. (pt states he has not eaten in 2 weeks).  Naso-jejunal tube removed; Surgery requesting TPN. BCx 11/8- NG. s/p IR PICC placed 11/15  Will start TPN with lipids @ 1L tonight (1/2 dose on first day), Will d/c IVF once TPN is initiated   Check FS 15 min and 1hr after starting TPN and then q6h thereafter.   hgbA1C 5.9 on 11/14. c/w ISS.   Triglycerides 122.  Check CMP/Mg/Phos/ Ionized calcium in am.   Daily weights. Strict I/O. Hold TPN if pt spikes fever and check BCX x2    2. Hypophosphatemia- Will give Kphos and Naphos in TPN.  Monitor K/Mg/ Phos daily.   3. Bacteremia/ Enteritis- Pt on Ceftriaxone/ Flagyl. BCx+ 11/ 6- Bacteroides fragilis. Repeat BCx 11/8 NG. ID following  4. Essential HTN- BP acceptable. Pt on Hydralazine 10mg IV q 6hr prn SBP >160. Monitor BP  5. SBO- plan as per surgery       TPN orders    Weight 64.4 kg  Total Kcal 1800  Fat 50g = 500 kcal  Protein 78g = 312 kcal  Dextrose 290g = 988 kcal    [facility] NEPHROLOGY  Jose Angel Horner M.D.  Caden Heck D.O.  Amalia Allen M.D.  MD Eugenia Cazares, MSN, ANP-C    Telephone: [phone]  Facsimile: [phone]    [address] [place] [address], [address]  [place] [address]

## 2024-11-16 NOTE — PROGRESS NOTE ADULT - SUBJECTIVE AND OBJECTIVE BOX
74y Male is under our care for bacteremia with Bacteroides and SBO. Patient is doing well and was found to be sitting comfortably on chair. Sump is intact but currently disconnected from suction. Underwent PICC line insertion yesterday. Remains afebrile with normal wbc count. Repeat BCs remain negative.    MEDS:  cefTRIAXone   IVPB 1000 milliGRAM(s) IV Intermittent every 24 hours  metroNIDAZOLE  IVPB 500 milliGRAM(s) IV Intermittent every 8 hours    ALLERGIES: Allergies    No Known Allergies    Intolerances    REVIEW OF SYSTEMS:  [  ] Not able to elicit  General: no fevers no malaise  Chest: no cough no sob  GI: no nvd  Skin: no rashes  Neuro: no ha's no dizziness 	    VITALS:  Vital Signs Last 24 Hrs  T(C): 36.7 (16 Nov 2024 13:11), Max: 36.8 (15 Nov 2024 20:28)  T(F): 98 (16 Nov 2024 13:11), Max: 98.2 (15 Nov 2024 20:28)  HR: 72 (16 Nov 2024 13:11) (58 - 72)  BP: 128/78 (16 Nov 2024 13:11) (128/78 - 166/77)  BP(mean): --  RR: 18 (16 Nov 2024 13:11) (18 - 18)  SpO2: 97% (16 Nov 2024 13:11) (95% - 97%)    PHYSICAL EXAM:  HEENT: +salem sump  Neck: supple no LN's   Respiratory: lungs clear no rales  Cardiovascular: S1 S2 reg no murmurs  Gastrointestinal: +BS with soft, nondistended abdomen; nontender +healing midline incision with steristrips  Extremities: no edema, +left arm PICC, single lumen  Skin: no rashes  Ortho: n/a  Neuro: AAO x 3    LABS/DIAGNOSTIC TESTS:                        12.4   5.21  )-----------( 480      ( 16 Nov 2024 08:15 )             37.1     WBC Count: 5.21 K/uL (11-16 @ 08:15)  WBC Count: 5.63 K/uL (11-15 @ 06:35)  WBC Count: 6.60 K/uL (11-14 @ 06:41)  WBC Count: 8.62 K/uL (11-13 @ 06:50)  WBC Count: 6.74 K/uL (11-12 @ 06:30)    11-16    142  |  108  |  2[L]  ----------------------------<  166[H]  4.0   |  30  |  0.79    Ca    8.4      16 Nov 2024 08:15  Phos  2.3     11-16  Mg     2.0     11-16    TPro  5.9[L]  /  Alb  2.6[L]  /  TBili  0.5  /  DBili  x   /  AST  35  /  ALT  28  /  AlkPhos  111  11-16      CULTURES:   .Blood BLOOD  11-08 @ 10:30   No growth at 5 days  --  --      .Blood BLOOD  11-06 @ 11:30   Growth in anaerobic bottle: Bacteroides fragilis "Susceptibilities not  performed"  Direct identification is available within approximately 3-5  hours either by Blood Panel Multiplexed PCR or Direct  MALDI-TOF. Details: [email or web address]  --  Blood Culture PCR      .Blood BLOOD  11-06 @ 11:20   Growth in anaerobic bottle: Bacteroides fragilis  "Susceptibilities not performed"  --    Growth in anaerobic bottle: Gram Negative Rods      Clean Catch Clean Catch (Midstream)  10-28 @ 12:30   <10,000 CFU/mL Normal Urogenital Merlene  --  --        RADIOLOGY:  no new studies

## 2024-11-16 NOTE — PROGRESS NOTE ADULT - ASSESSMENT
74M  s/p exlap lysis of adhesions, small bowel resection readmitted with recurrent SBO.  s/p EGD on 11/13; stricture was found which was dilated, feeding tube passed through that portion and subsequently removed 11/16   NGT replaced    L PICC placed 11/15    -NPO, NGT to LIS  -TPN via left PICC per nephro   -Ambulate, OOBTC  -Pain control prn  -Continue Lovenox  -Monitor bowel function  - iv abx, flagyl for total of 14 days

## 2024-11-16 NOTE — PROGRESS NOTE ADULT - NSPROGADDITIONALINFOA_GEN_ALL_CORE
I have personally seen and examined the patient with surgical team. Agree with the history, physical exam, and plan as documented by the PA.   As in above full notation.  Vitals non-suggestive.  Wounds clean and abdomen soft with appropriate incisional tenderness.  Labs reassuring.  Surgically, stable at present.  To continue current supportive care, TPN to start today, daily clamp trial started at 11AM for 4hrs - measure residuals.  Reviewed with patient in detail, Surgical team as well as RN's.

## 2024-11-17 LAB
ALBUMIN SERPL ELPH-MCNC: 2.7 G/DL — LOW (ref 3.5–5)
ALP SERPL-CCNC: 117 U/L — SIGNIFICANT CHANGE UP (ref 40–120)
ALT FLD-CCNC: 32 U/L DA — SIGNIFICANT CHANGE UP (ref 10–60)
ANION GAP SERPL CALC-SCNC: 3 MMOL/L — LOW (ref 5–17)
AST SERPL-CCNC: 39 U/L — SIGNIFICANT CHANGE UP (ref 10–40)
BASOPHILS # BLD AUTO: 0.04 K/UL — SIGNIFICANT CHANGE UP (ref 0–0.2)
BASOPHILS NFR BLD AUTO: 0.7 % — SIGNIFICANT CHANGE UP (ref 0–2)
BILIRUB SERPL-MCNC: 0.4 MG/DL — SIGNIFICANT CHANGE UP (ref 0.2–1.2)
BUN SERPL-MCNC: 4 MG/DL — LOW (ref 7–18)
CALCIUM SERPL-MCNC: 8.7 MG/DL — SIGNIFICANT CHANGE UP (ref 8.4–10.5)
CHLORIDE SERPL-SCNC: 109 MMOL/L — HIGH (ref 96–108)
CO2 SERPL-SCNC: 30 MMOL/L — SIGNIFICANT CHANGE UP (ref 22–31)
CREAT SERPL-MCNC: 0.88 MG/DL — SIGNIFICANT CHANGE UP (ref 0.5–1.3)
EGFR: 90 ML/MIN/1.73M2 — SIGNIFICANT CHANGE UP
EOSINOPHIL # BLD AUTO: 0.19 K/UL — SIGNIFICANT CHANGE UP (ref 0–0.5)
EOSINOPHIL NFR BLD AUTO: 3.4 % — SIGNIFICANT CHANGE UP (ref 0–6)
GLUCOSE BLDC GLUCOMTR-MCNC: 132 MG/DL — HIGH (ref 70–99)
GLUCOSE BLDC GLUCOMTR-MCNC: 138 MG/DL — HIGH (ref 70–99)
GLUCOSE BLDC GLUCOMTR-MCNC: 158 MG/DL — HIGH (ref 70–99)
GLUCOSE BLDC GLUCOMTR-MCNC: 162 MG/DL — HIGH (ref 70–99)
GLUCOSE SERPL-MCNC: 131 MG/DL — HIGH (ref 70–99)
HCT VFR BLD CALC: 38.1 % — LOW (ref 39–50)
HGB BLD-MCNC: 12.8 G/DL — LOW (ref 13–17)
IMM GRANULOCYTES NFR BLD AUTO: 1.1 % — HIGH (ref 0–0.9)
LYMPHOCYTES # BLD AUTO: 0.87 K/UL — LOW (ref 1–3.3)
LYMPHOCYTES # BLD AUTO: 15.6 % — SIGNIFICANT CHANGE UP (ref 13–44)
MAGNESIUM SERPL-MCNC: 2.3 MG/DL — SIGNIFICANT CHANGE UP (ref 1.6–2.6)
MCHC RBC-ENTMCNC: 28.6 PG — SIGNIFICANT CHANGE UP (ref 27–34)
MCHC RBC-ENTMCNC: 33.6 G/DL — SIGNIFICANT CHANGE UP (ref 32–36)
MCV RBC AUTO: 85.2 FL — SIGNIFICANT CHANGE UP (ref 80–100)
MONOCYTES # BLD AUTO: 0.63 K/UL — SIGNIFICANT CHANGE UP (ref 0–0.9)
MONOCYTES NFR BLD AUTO: 11.3 % — SIGNIFICANT CHANGE UP (ref 2–14)
NEUTROPHILS # BLD AUTO: 3.79 K/UL — SIGNIFICANT CHANGE UP (ref 1.8–7.4)
NEUTROPHILS NFR BLD AUTO: 67.9 % — SIGNIFICANT CHANGE UP (ref 43–77)
NRBC # BLD: 0 /100 WBCS — SIGNIFICANT CHANGE UP (ref 0–0)
PHOSPHATE SERPL-MCNC: 2.6 MG/DL — SIGNIFICANT CHANGE UP (ref 2.5–4.5)
PLATELET # BLD AUTO: 463 K/UL — HIGH (ref 150–400)
POTASSIUM SERPL-MCNC: 4.6 MMOL/L — SIGNIFICANT CHANGE UP (ref 3.5–5.3)
POTASSIUM SERPL-SCNC: 4.6 MMOL/L — SIGNIFICANT CHANGE UP (ref 3.5–5.3)
PROT SERPL-MCNC: 6.4 G/DL — SIGNIFICANT CHANGE UP (ref 6–8.3)
RBC # BLD: 4.47 M/UL — SIGNIFICANT CHANGE UP (ref 4.2–5.8)
RBC # FLD: 14.9 % — HIGH (ref 10.3–14.5)
SODIUM SERPL-SCNC: 142 MMOL/L — SIGNIFICANT CHANGE UP (ref 135–145)
WBC # BLD: 5.58 K/UL — SIGNIFICANT CHANGE UP (ref 3.8–10.5)
WBC # FLD AUTO: 5.58 K/UL — SIGNIFICANT CHANGE UP (ref 3.8–10.5)

## 2024-11-17 RX ORDER — FAT EMULSIONS 20 %
0.78 EMULSION INTRAVENOUS
Qty: 50 | Refills: 0 | Status: DISCONTINUED | OUTPATIENT
Start: 2024-11-17 | End: 2024-11-18

## 2024-11-17 RX ORDER — SODIUM/POT/MAG/CALC/CHLOR/ACET 35-20-5MEQ
1 VIAL (ML) INTRAVENOUS
Refills: 0 | Status: DISCONTINUED | OUTPATIENT
Start: 2024-11-17 | End: 2024-11-17

## 2024-11-17 RX ADMIN — METRONIDAZOLE 100 MILLIGRAM(S): 500 TABLET ORAL at 06:00

## 2024-11-17 RX ADMIN — Medication 1: at 23:30

## 2024-11-17 RX ADMIN — Medication 83 EACH: at 22:04

## 2024-11-17 RX ADMIN — PANTOPRAZOLE SODIUM 40 MILLIGRAM(S): 40 TABLET, DELAYED RELEASE ORAL at 13:45

## 2024-11-17 RX ADMIN — Medication 1: at 05:59

## 2024-11-17 RX ADMIN — ENOXAPARIN SODIUM 40 MILLIGRAM(S): 30 INJECTION SUBCUTANEOUS at 16:19

## 2024-11-17 RX ADMIN — Medication 10.4 GM/KG/DAY: at 22:02

## 2024-11-17 RX ADMIN — METOCLOPRAMIDE HYDROCHLORIDE 10 MILLIGRAM(S): 10 TABLET ORAL at 22:02

## 2024-11-17 RX ADMIN — METRONIDAZOLE 100 MILLIGRAM(S): 500 TABLET ORAL at 22:02

## 2024-11-17 RX ADMIN — CHLORHEXIDINE GLUCONATE 1 APPLICATION(S): 1.2 RINSE ORAL at 13:55

## 2024-11-17 RX ADMIN — Medication 5.21 GM/KG/DAY: at 09:00

## 2024-11-17 RX ADMIN — METOCLOPRAMIDE HYDROCHLORIDE 10 MILLIGRAM(S): 10 TABLET ORAL at 06:07

## 2024-11-17 RX ADMIN — METRONIDAZOLE 100 MILLIGRAM(S): 500 TABLET ORAL at 13:46

## 2024-11-17 RX ADMIN — Medication 1 EACH: at 09:00

## 2024-11-17 RX ADMIN — METOCLOPRAMIDE HYDROCHLORIDE 10 MILLIGRAM(S): 10 TABLET ORAL at 13:46

## 2024-11-17 RX ADMIN — Medication 100 MILLIGRAM(S): at 09:00

## 2024-11-17 NOTE — PROGRESS NOTE ADULT - SUBJECTIVE AND OBJECTIVE BOX
[facility] NEPHROLOGY- PROGRESS NOTE    Patient is a 74y Male with h/o gastric ulcer (s/p partial gastrectomy and Bilroth II reconstruction in 1970s), VASILIY, HTN , s/p appendectomy (1980s), s/p hydrocele (1990s) recently admitted to [facility] 10/28-11/3 for SBO s/p Ex-lap, SHANON with small bowel resection on 10/28  presents with n/v and abd pain. Pt a/w recurrent SBO and Bacteremia. . Nutrition consulted for TPN.   Repeat BCX 11/8 NG  s/p EGD 11/13 with +Nasojejunal enteral tube placement  However NJ tube kinked and removed on 11/15.   s/p IR LUE PICC placed on 11/15    Hospital Medications: Medications reviewed.  REVIEW OF SYSTEMS:  CONSTITUTIONAL: No fevers or chills  RESPIRATORY: No shortness of breath  CARDIOVASCULAR: No chest pain.  GASTROINTESTINAL: No nausea, or vomiting, or abdominal pain. No BM or flatulence  VASCULAR: No bilateral lower extremity edema.     VITALS:  T(F): 98 (11-17-24 @ 12:36), Max: 98.7 (11-16-24 @ 20:35)  HR: 65 (11-17-24 @ 12:36)  BP: 127/82 (11-17-24 @ 12:36)  RR: 18 (11-17-24 @ 12:36)  SpO2: 97% (11-17-24 @ 12:36)  Wt(kg): --    11-16 @ 07:01  -  11-17 @ 07:00  --------------------------------------------------------  IN: 0 mL / OUT: 1150 mL / NET: -1150 mL      PHYSICAL EXAM:  Constitutional: NAD,  HEENT: anicteric sclera, +NGT    Neck: No JVD  Respiratory: CTA b/l  Cardiovascular: S1, S2, RRR  Gastrointestinal: soft, midline staples    Extremities:  No peripheral edema  Access: Left PICC line- intact    LABS:  11-17    142  |  109[H]  |  4[L]  ----------------------------<  131[H]  4.6   |  30  |  0.88    Ca    8.7      17 Nov 2024 07:50  Phos  2.6     11-17  Mg     2.3     11-17    TPro  6.4  /  Alb  2.7[L]  /  TBili  0.4  /  DBili      /  AST  39  /  ALT  32  /  AlkPhos  117  11-17    Creatinine Trend: 0.88 <--, 0.79 <--, 0.74 <--, 0.81 <--, 0.68 <--, 0.75 <--, 0.74 <--                        12.8   5.58  )-----------( 463      ( 17 Nov 2024 07:50 )             38.1     Urine Studies:  Urinalysis Basic - ( 17 Nov 2024 07:50 )    Color:  / Appearance:  / SG:  / pH:   Gluc: 131 mg/dL / Ketone:   / Bili:  / Urobili:    Blood:  / Protein:  / Nitrite:    Leuk Esterase:  / RBC:  / WBC    Sq Epi:  / Non Sq Epi:  / Bacteria:

## 2024-11-17 NOTE — PROGRESS NOTE ADULT - SUBJECTIVE AND OBJECTIVE BOX
74y Male is under our care for bacteremia with Bacteroides and SBO. Patient is doing well and currently has a visitor. Remains afebrile with normal wbc count.     MEDS:  cefTRIAXone   IVPB 1000 milliGRAM(s) IV Intermittent every 24 hours  metroNIDAZOLE  IVPB 500 milliGRAM(s) IV Intermittent every 8 hours    ALLERGIES: Allergies    No Known Allergies    Intolerances    REVIEW OF SYSTEMS:  [  ] Not able to elicit  General: no fevers no malaise  Chest: no cough no sob  GI: no nvd  Skin: no rashes  Neuro: no ha's no dizziness 	    VITALS:  Vital Signs Last 24 Hrs  T(C): 36.7 (11-17-24 @ 12:36), Max: 37.1 (11-16-24 @ 20:35)  T(F): 98 (11-17-24 @ 12:36), Max: 98.7 (11-16-24 @ 20:35)  HR: 65 (11-17-24 @ 12:36) (61 - 65)  BP: 127/82 (11-17-24 @ 12:36) (127/82 - 153/87)  BP(mean): 109 (11-16-24 @ 20:35) (109 - 109)  RR: 18 (11-17-24 @ 12:36) (18 - 18)  SpO2: 97% (11-17-24 @ 12:36) (96% - 97%)    PHYSICAL EXAM:  HEENT: +salem sump  Neck: supple no LN's   Respiratory: lungs clear no rales  Cardiovascular: S1 S2 reg no murmurs  Gastrointestinal: +BS with soft, nondistended abdomen; nontender +healing midline incision with steristrips  Extremities: no edema, +left arm PICC, single lumen  Skin: no rashes  Ortho: n/a  Neuro: awake and alert    LABS/DIAGNOSTIC TESTS:                        12.8   5.58  )-----------( 463      ( 17 Nov 2024 07:50 )             38.1   WBC Count: 5.58 K/uL (11-17 @ 07:50)  WBC Count: 5.21 K/uL (11-16 @ 08:15)  WBC Count: 5.63 K/uL (11-15 @ 06:35)  WBC Count: 6.60 K/uL (11-14 @ 06:41)  WBC Count: 8.62 K/uL (11-13 @ 06:50)    11-17    142  |  109[H]  |  4[L]  ----------------------------<  131[H]  4.6   |  30  |  0.88    Ca    8.7      17 Nov 2024 07:50  Phos  2.6     11-17  Mg     2.3     11-17    TPro  6.4  /  Alb  2.7[L]  /  TBili  0.4  /  DBili  x   /  AST  39  /  ALT  32  /  AlkPhos  117  11-17      CULTURES:   .Blood BLOOD  11-08 @ 10:30   No growth at 5 days  --  --      .Blood BLOOD  11-06 @ 11:30   Growth in anaerobic bottle: Bacteroides fragilis "Susceptibilities not  performed"  Direct identification is available within approximately 3-5  hours either by Blood Panel Multiplexed PCR or Direct  MALDI-TOF. Details: [email or web address]  --  Blood Culture PCR      .Blood BLOOD  11-06 @ 11:20   Growth in anaerobic bottle: Bacteroides fragilis  "Susceptibilities not performed"  --    Growth in anaerobic bottle: Gram Negative Rods      Clean Catch Clean Catch (Midstream)  10-28 @ 12:30   <10,000 CFU/mL Normal Urogenital Merlene  --  --        RADIOLOGY:  no new studies

## 2024-11-17 NOTE — PROGRESS NOTE ADULT - ASSESSMENT
Patient is a 74y Male with h/o gastric ulcer (s/p partial gastrectomy and Bilroth II reconstruction in 1970s), VASILIY, HTN , s/p appendectomy (1980s), s/p hydrocele (1990s) recently admitted to [facility] beds at [facility] 10/28-11/3 for SBO s/p Ex-lap, SHANON with small bowel resection on 10/28  presents with n/v and abd pain. Pt a/w recurrent SBO and Bacteremia. . Nutrition consulted for TPN.   Repeat BCX 11/8 NG  s/p EGD 11/13 with +Nasojejunal enteral tube placement    1. Severe PCM- ~5% wt loss in 1 week, NPO >1 week. (pt states he has not eaten in 2 weeks).  Naso-jejunal tube removed; BCx 11/8- NG. s/p IR PICC placed 11/15. Pt initiated on TPN on 11/16 due to prolonged NPO status.   Will increase TPN with lipids to 2L tonight (full dose). Avoid IVF  FS acceptable. c/w FS q6hrs while on TPN.   hgbA1C 5.9 on 11/14. c/w ISS.   Triglycerides 122.  Check CMP/Mg/Phos/ Ionized calcium in am. Check TG in am  Daily weights. Strict I/O. Hold TPN if pt spikes fever and check BCX x2    2. Hypophosphatemia- resolved with phos in TPN.  Monitor K/Mg/ Phos daily.   3. Bacteremia/ Enteritis- Pt on Ceftriaxone/ Flagyl. BCx+ 11/ 6- Bacteroides fragilis. Repeat BCx 11/8 NG. ID following  4. Essential HTN- BP acceptable. Pt on Hydralazine 10mg IV q 6hr prn SBP >160. Monitor BP  5. SBO- plan as per surgery       TPN orders    Weight 64.4 kg  Total Kcal 1800  Fat 50g = 500 kcal  Protein 78g = 312 kcal  Dextrose 290g = 988 kcal    [facility] NEPHROLOGY  Jose Angel Horner M.D.  Caden Heck D.O.  Amalia Allen M.D.  MD Eugenia Cazares, MSN, ANP-C    Telephone: [phone]  Facsimile: [phone] [address], [address]  [place] [id number]

## 2024-11-17 NOTE — PROGRESS NOTE ADULT - ASSESSMENT
74M  s/p exlap lysis of adhesions, small bowel resection readmitted with recurrent SBO.  s/p EGD on 11/13; stricture was found which was dilated, feeding tube passed through that portion and subsequently removed 11/16   NGT replaced    L PICC placed 11/15    -NPO, NGT to LIS  -TPN via left PICC per nephro   -Ambulate, OOBTC  -Pain control prn  -Continue Lovenox  -Monitor bowel function  - iv abx, flagyl for total of 14 days    -Clamp trial today for 6 to 8hrs  (clamped at noon)

## 2024-11-17 NOTE — PROGRESS NOTE ADULT - ASSESSMENT
Bacteremia - with Bacteroides  SBO    Plan -   ·	Cont Rocephin 1gm iv qd  ·	Cont Flagyl 500mgs iv q8hrs D12, needs 2 days more.

## 2024-11-17 NOTE — PROGRESS NOTE ADULT - SUBJECTIVE AND OBJECTIVE BOX
INTERVAL HPI/OVERNIGHT EVENTS:    Patient seen and evaluated at bedside and found hemodynamically stable and in no acute distress. No acute events overnight. Tolerated 4hrs of clamp trial with 350cc residual. NGT with bilious output. No bowel function. TPN running. Denies fevers, chills, chest pain, SOB, coughing, dizziness, n/v/d, or generalized malaise.         SUBJECTIVE:      MEDICATIONS  (STANDING):  benzocaine/menthol Lozenge 1 Lozenge Oral once  cefTRIAXone   IVPB 1000 milliGRAM(s) IV Intermittent every 24 hours  chlorhexidine 2% Cloths 1 Application(s) Topical daily  dextrose 5%. 1000 milliLiter(s) (50 mL/Hr) IV Continuous <Continuous>  dextrose 5%. 1000 milliLiter(s) (100 mL/Hr) IV Continuous <Continuous>  dextrose 50% Injectable 25 Gram(s) IV Push once  dextrose 50% Injectable 12.5 Gram(s) IV Push once  dextrose 50% Injectable 25 Gram(s) IV Push once  diatrizoate meglumine/diatrizoate sodium. 60 milliLiter(s) Oral once  enoxaparin Injectable 40 milliGRAM(s) SubCutaneous every 24 hours  glucagon  Injectable 1 milliGRAM(s) IntraMuscular once  insulin lispro (ADMELOG) corrective regimen sliding scale   SubCutaneous every 6 hours  lipid, fat emulsion (Fish Oil and Plant Based) 20% Infusion 0.7764 Gm/kG/Day (10.4 mL/Hr) IV Continuous <Continuous>  lipid, fat emulsion (Fish Oil and Plant Based) 20% Infusion 0.3882 Gm/kG/Day (5.21 mL/Hr) IV Continuous <Continuous>  metoclopramide Injectable 10 milliGRAM(s) IV Push every 8 hours  metroNIDAZOLE  IVPB 500 milliGRAM(s) IV Intermittent every 8 hours  pantoprazole  Injectable 40 milliGRAM(s) IV Push every 24 hours  Parenteral Nutrition - Adult 1 Each (83 mL/Hr) TPN Continuous <Continuous>  Parenteral Nutrition - Adult 1 Each (42 mL/Hr) TPN Continuous <Continuous>    MEDICATIONS  (PRN):  dextrose Oral Gel 15 Gram(s) Oral once PRN Blood Glucose LESS THAN 70 milliGRAM(s)/deciliter  hydrALAZINE Injectable 10 milliGRAM(s) IV Push every 6 hours PRN systolic BP > 160  ondansetron Injectable 4 milliGRAM(s) IV Push every 6 hours PRN Nausea  sodium chloride 0.9% lock flush 10 milliLiter(s) IV Push every 1 hour PRN Pre/post blood products, medications, blood draw, and to maintain line patency      Vital Signs Last 24 Hrs  T(C): 36.7 (17 Nov 2024 12:36), Max: 37.1 (16 Nov 2024 20:35)  T(F): 98 (17 Nov 2024 12:36), Max: 98.7 (16 Nov 2024 20:35)  HR: 65 (17 Nov 2024 12:36) (61 - 72)  BP: 127/82 (17 Nov 2024 12:36) (127/82 - 153/87)  BP(mean): 109 (16 Nov 2024 20:35) (109 - 109)  RR: 18 (17 Nov 2024 12:36) (18 - 18)  SpO2: 97% (17 Nov 2024 12:36) (96% - 97%)    Parameters below as of 17 Nov 2024 12:36  Patient On (Oxygen Delivery Method): room air        PHYSICAL EXAM:    General: lying in bed in no acute distress  HEENT: NGT in place with bilious output. Neck supple  Chest: non-labored breathing or conversational dyspnea   Abdomen: non-distended, soft and depressible, non-tender. No guarding or rebound, non-peritonitic. Steristrips on midline incision.   Ext: no edema or cyanosis           I&O's Detail    16 Nov 2024 07:01  -  17 Nov 2024 07:00  --------------------------------------------------------  IN:  Total IN: 0 mL    OUT:    Nasogastric/Oral tube (mL): 250 mL    Voided (mL): 900 mL  Total OUT: 1150 mL    Total NET: -1150 mL          LABS:                        12.8   5.58  )-----------( 463      ( 17 Nov 2024 07:50 )             38.1     11-17    142  |  109[H]  |  4[L]  ----------------------------<  131[H]  4.6   |  30  |  0.88    Ca    8.7      17 Nov 2024 07:50  Phos  2.6     11-17  Mg     2.3     11-17    TPro  6.4  /  Alb  2.7[L]  /  TBili  0.4  /  DBili  x   /  AST  39  /  ALT  32  /  AlkPhos  117  11-17      Urinalysis Basic - ( 17 Nov 2024 07:50 )    Color: x / Appearance: x / SG: x / pH: x  Gluc: 131 mg/dL / Ketone: x  / Bili: x / Urobili: x   Blood: x / Protein: x / Nitrite: x   Leuk Esterase: x / RBC: x / WBC x   Sq Epi: x / Non Sq Epi: x / Bacteria: x

## 2024-11-18 LAB
ALBUMIN SERPL ELPH-MCNC: 2.7 G/DL — LOW (ref 3.5–5)
ALP SERPL-CCNC: 107 U/L — SIGNIFICANT CHANGE UP (ref 40–120)
ALT FLD-CCNC: 28 U/L DA — SIGNIFICANT CHANGE UP (ref 10–60)
ANION GAP SERPL CALC-SCNC: 4 MMOL/L — LOW (ref 5–17)
AST SERPL-CCNC: 24 U/L — SIGNIFICANT CHANGE UP (ref 10–40)
BASOPHILS # BLD AUTO: 0.03 K/UL — SIGNIFICANT CHANGE UP (ref 0–0.2)
BASOPHILS NFR BLD AUTO: 0.5 % — SIGNIFICANT CHANGE UP (ref 0–2)
BILIRUB SERPL-MCNC: 0.4 MG/DL — SIGNIFICANT CHANGE UP (ref 0.2–1.2)
BUN SERPL-MCNC: 10 MG/DL — SIGNIFICANT CHANGE UP (ref 7–18)
CA-I BLD-SCNC: 4.9 MG/DL — SIGNIFICANT CHANGE UP (ref 4.5–5.6)
CA-I BLD-SCNC: 5.1 MG/DL — SIGNIFICANT CHANGE UP (ref 4.5–5.6)
CALCIUM SERPL-MCNC: 8.4 MG/DL — SIGNIFICANT CHANGE UP (ref 8.4–10.5)
CHLORIDE SERPL-SCNC: 106 MMOL/L — SIGNIFICANT CHANGE UP (ref 96–108)
CO2 SERPL-SCNC: 30 MMOL/L — SIGNIFICANT CHANGE UP (ref 22–31)
CREAT SERPL-MCNC: 0.84 MG/DL — SIGNIFICANT CHANGE UP (ref 0.5–1.3)
EGFR: 92 ML/MIN/1.73M2 — SIGNIFICANT CHANGE UP
EOSINOPHIL # BLD AUTO: 0.19 K/UL — SIGNIFICANT CHANGE UP (ref 0–0.5)
EOSINOPHIL NFR BLD AUTO: 3.4 % — SIGNIFICANT CHANGE UP (ref 0–6)
GLUCOSE BLDC GLUCOMTR-MCNC: 141 MG/DL — HIGH (ref 70–99)
GLUCOSE BLDC GLUCOMTR-MCNC: 143 MG/DL — HIGH (ref 70–99)
GLUCOSE BLDC GLUCOMTR-MCNC: 152 MG/DL — HIGH (ref 70–99)
GLUCOSE BLDC GLUCOMTR-MCNC: 156 MG/DL — HIGH (ref 70–99)
GLUCOSE BLDC GLUCOMTR-MCNC: 160 MG/DL — HIGH (ref 70–99)
GLUCOSE SERPL-MCNC: 165 MG/DL — HIGH (ref 70–99)
HCT VFR BLD CALC: 37.4 % — LOW (ref 39–50)
HGB BLD-MCNC: 12.5 G/DL — LOW (ref 13–17)
IMM GRANULOCYTES NFR BLD AUTO: 1.6 % — HIGH (ref 0–0.9)
LYMPHOCYTES # BLD AUTO: 0.79 K/UL — LOW (ref 1–3.3)
LYMPHOCYTES # BLD AUTO: 14.1 % — SIGNIFICANT CHANGE UP (ref 13–44)
MAGNESIUM SERPL-MCNC: 2.2 MG/DL — SIGNIFICANT CHANGE UP (ref 1.6–2.6)
MCHC RBC-ENTMCNC: 28.6 PG — SIGNIFICANT CHANGE UP (ref 27–34)
MCHC RBC-ENTMCNC: 33.4 G/DL — SIGNIFICANT CHANGE UP (ref 32–36)
MCV RBC AUTO: 85.6 FL — SIGNIFICANT CHANGE UP (ref 80–100)
MONOCYTES # BLD AUTO: 0.65 K/UL — SIGNIFICANT CHANGE UP (ref 0–0.9)
MONOCYTES NFR BLD AUTO: 11.6 % — SIGNIFICANT CHANGE UP (ref 2–14)
NEUTROPHILS # BLD AUTO: 3.87 K/UL — SIGNIFICANT CHANGE UP (ref 1.8–7.4)
NEUTROPHILS NFR BLD AUTO: 68.8 % — SIGNIFICANT CHANGE UP (ref 43–77)
NRBC # BLD: 0 /100 WBCS — SIGNIFICANT CHANGE UP (ref 0–0)
PHOSPHATE SERPL-MCNC: 2.9 MG/DL — SIGNIFICANT CHANGE UP (ref 2.5–4.5)
PLATELET # BLD AUTO: 406 K/UL — HIGH (ref 150–400)
POTASSIUM SERPL-MCNC: 4.2 MMOL/L — SIGNIFICANT CHANGE UP (ref 3.5–5.3)
POTASSIUM SERPL-SCNC: 4.2 MMOL/L — SIGNIFICANT CHANGE UP (ref 3.5–5.3)
PROT SERPL-MCNC: 6.4 G/DL — SIGNIFICANT CHANGE UP (ref 6–8.3)
RBC # BLD: 4.37 M/UL — SIGNIFICANT CHANGE UP (ref 4.2–5.8)
RBC # FLD: 14.9 % — HIGH (ref 10.3–14.5)
SODIUM SERPL-SCNC: 140 MMOL/L — SIGNIFICANT CHANGE UP (ref 135–145)
TRIGL SERPL-MCNC: 233 MG/DL — HIGH
WBC # BLD: 5.62 K/UL — SIGNIFICANT CHANGE UP (ref 3.8–10.5)
WBC # FLD AUTO: 5.62 K/UL — SIGNIFICANT CHANGE UP (ref 3.8–10.5)

## 2024-11-18 RX ORDER — ENOXAPARIN SODIUM 30 MG/.3ML
40 INJECTION SUBCUTANEOUS EVERY 24 HOURS
Refills: 0 | Status: DISCONTINUED | OUTPATIENT
Start: 2024-11-18 | End: 2024-11-25

## 2024-11-18 RX ORDER — METRONIDAZOLE 500 MG/1
500 TABLET ORAL EVERY 8 HOURS
Refills: 0 | Status: DISCONTINUED | OUTPATIENT
Start: 2024-11-18 | End: 2024-11-20

## 2024-11-18 RX ORDER — SODIUM/POT/MAG/CALC/CHLOR/ACET 35-20-5MEQ
1 VIAL (ML) INTRAVENOUS
Refills: 0 | Status: DISCONTINUED | OUTPATIENT
Start: 2024-11-18 | End: 2024-11-18

## 2024-11-18 RX ORDER — PANTOPRAZOLE SODIUM 40 MG/1
40 TABLET, DELAYED RELEASE ORAL EVERY 24 HOURS
Refills: 0 | Status: DISCONTINUED | OUTPATIENT
Start: 2024-11-18 | End: 2024-11-21

## 2024-11-18 RX ORDER — FAT EMULSIONS 20 %
0.78 EMULSION INTRAVENOUS
Qty: 50 | Refills: 0 | Status: DISCONTINUED | OUTPATIENT
Start: 2024-11-18 | End: 2024-11-18

## 2024-11-18 RX ADMIN — PANTOPRAZOLE SODIUM 40 MILLIGRAM(S): 40 TABLET, DELAYED RELEASE ORAL at 18:59

## 2024-11-18 RX ADMIN — Medication 10.4 GM/KG/DAY: at 09:55

## 2024-11-18 RX ADMIN — Medication 1: at 13:25

## 2024-11-18 RX ADMIN — METOCLOPRAMIDE HYDROCHLORIDE 10 MILLIGRAM(S): 10 TABLET ORAL at 22:52

## 2024-11-18 RX ADMIN — METRONIDAZOLE 100 MILLIGRAM(S): 500 TABLET ORAL at 22:52

## 2024-11-18 RX ADMIN — PANTOPRAZOLE SODIUM 40 MILLIGRAM(S): 40 TABLET, DELAYED RELEASE ORAL at 13:14

## 2024-11-18 RX ADMIN — Medication 100 MILLIGRAM(S): at 09:54

## 2024-11-18 RX ADMIN — METOCLOPRAMIDE HYDROCHLORIDE 10 MILLIGRAM(S): 10 TABLET ORAL at 06:04

## 2024-11-18 RX ADMIN — METOCLOPRAMIDE HYDROCHLORIDE 10 MILLIGRAM(S): 10 TABLET ORAL at 13:26

## 2024-11-18 RX ADMIN — METRONIDAZOLE 100 MILLIGRAM(S): 500 TABLET ORAL at 13:25

## 2024-11-18 RX ADMIN — Medication 10.4 GM/KG/DAY: at 22:49

## 2024-11-18 RX ADMIN — CHLORHEXIDINE GLUCONATE 1 APPLICATION(S): 1.2 RINSE ORAL at 13:26

## 2024-11-18 RX ADMIN — Medication 1: at 18:56

## 2024-11-18 RX ADMIN — METRONIDAZOLE 100 MILLIGRAM(S): 500 TABLET ORAL at 06:04

## 2024-11-18 RX ADMIN — Medication 1 EACH: at 22:49

## 2024-11-18 RX ADMIN — ENOXAPARIN SODIUM 40 MILLIGRAM(S): 30 INJECTION SUBCUTANEOUS at 13:14

## 2024-11-18 NOTE — PROGRESS NOTE ADULT - ATTENDING COMMENTS
Vitals non-suggestive.  Wounds clean and abdomen soft with appropriate incisional tenderness.  Labs reassuring.  Clinically, improving overall. NGT clamp trial daily. ~350cc residual  Surgically, stable at present.  To continue current supportive care.  Reviewed with patient in detail, Hospitalist, Surgical team as well as RN's.

## 2024-11-18 NOTE — PROGRESS NOTE ADULT - ASSESSMENT
Bacteremia - with Bacteroides  SBO    Plan -   ·	Cont Rocephin 1gm iv qd  ·	Cont Flagyl 500mgs iv q8hrs D13, needs 1 day more.

## 2024-11-18 NOTE — PROGRESS NOTE ADULT - ASSESSMENT
74M  s/p exlap lysis of adhesions, small bowel resection readmitted with recurrent SBO.  s/p EGD on 11/13; stricture was found which was dilated, feeding tube passed through that portion and subsequently removed 11/16   NGT replaced    L PICC placed 11/15    Plan  -Multimodal pain control PRN  -Continue NPO, NGT to LIS  -Continue TPN  -Replete electrolytes PRN   -Monitor NGT output   -TPN via left PICC per nephro   -Daily OOBTC and continue ambulation   -Continue Lovenox for dvt ppx   -Monitor return bowel function  -Continue IV abx, flagyl for total of 14 days

## 2024-11-18 NOTE — PROGRESS NOTE ADULT - SUBJECTIVE AND OBJECTIVE BOX
Patient seen and examined at bedside. Patient with no acute overnight events. Clamp trial completed yesterday with residual NGT output 350cc. NGT remains to LIS with 600cc bilious output in the last 24 hours. Pain well controlled. Denies nausea or vomiting. Denies any return of bowel function. Ambulating independently. TPN started. WBC count 5.62 from 5.58. Hemoglobin 12.5 from 12.8.  Hemodynamically stable and afebrile overnight.     Vital Signs Last 24 Hrs  T(C): 37 (18 Nov 2024 04:56), Max: 37 (18 Nov 2024 04:56)  T(F): 98.6 (18 Nov 2024 04:56), Max: 98.6 (18 Nov 2024 04:56)  HR: 65 (18 Nov 2024 04:56) (65 - 68)  BP: 137/88 (18 Nov 2024 04:56) (127/82 - 137/88)  BP(mean): 101 (17 Nov 2024 20:49) (101 - 101)  RR: 18 (18 Nov 2024 04:56) (18 - 18)  SpO2: 97% (18 Nov 2024 04:56) (97% - 97%)    Parameters below as of 18 Nov 2024 04:56  Patient On (Oxygen Delivery Method): room air    PE  General: well appearing, no acute distress  Resp: Normal respiratory effort  CV: regular rate and rhythm   Abd: soft, nontender, nondistended. no guarding or rebound tenderness. Steristrips in place over midline incision, clean, dry, and intact.   : voiding spontaneously   MSK: FROM in bilateral extremities  Neuro: GCS15, sensation intact bilaterally                          12.5   5.62  )-----------( 406      ( 18 Nov 2024 07:11 )             37.4   11-18    140  |  106  |  10  ----------------------------<  165[H]  4.2   |  30  |  0.84    Ca    8.4      18 Nov 2024 07:11  Phos  2.9     11-18  Mg     2.2     11-18    TPro  6.4  /  Alb  2.7[L]  /  TBili  0.4  /  DBili  x   /  AST  24  /  ALT  28  /  AlkPhos  107  11-18

## 2024-11-18 NOTE — PROGRESS NOTE ADULT - SUBJECTIVE AND OBJECTIVE BOX
[facility] NEPHROLOGY- PROGRESS NOTE    Patient is a 74y Male with h/o gastric ulcer (s/p partial gastrectomy and Bilroth II reconstruction in 1970s), VASILIY, HTN , s/p appendectomy (1980s), s/p hydrocele (1990s) recently admitted to [facility] 10/28-11/3 for SBO s/p Ex-lap, SHANON with small bowel resection on 10/28  presents with n/v and abd pain. Pt a/w recurrent SBO and Bacteremia. . Nutrition consulted for TPN.   Repeat BCX 11/8 NG  s/p EGD 11/13 with +Nasojejunal enteral tube placement  However NJ tube kinked and removed on 11/15.   s/p IR LUE PICC placed on 11/15    Hospital Medications: Medications reviewed.  REVIEW OF SYSTEMS:  CONSTITUTIONAL: No fevers or chills  RESPIRATORY: No shortness of breath  CARDIOVASCULAR: No chest pain.  GASTROINTESTINAL: No nausea, or vomiting, or abdominal pain. No BM or flatulence  VASCULAR: No bilateral lower extremity edema.     VITALS:  T(F): 98.6 (11-18-24 @ 04:56), Max: 98.6 (11-18-24 @ 04:56)  HR: 65 (11-18-24 @ 04:56)  BP: 137/88 (11-18-24 @ 04:56)  RR: 18 (11-18-24 @ 04:56)  SpO2: 97% (11-18-24 @ 04:56)  Wt(kg): --    11-17 @ 07:01  -  11-18 @ 07:00  --------------------------------------------------------  IN: 566.4 mL / OUT: 2400 mL / NET: -1833.6 mL        PHYSICAL EXAM:  Constitutional: NAD,  HEENT: anicteric sclera, +NGT    Neck: No JVD  Respiratory: CTA b/l  Cardiovascular: S1, S2, RRR  Gastrointestinal: soft, midline staples    Extremities:  No peripheral edema  Access: Left PICC line- intact    LABS:  11-18    140  |  106  |  10  ----------------------------<  165[H]  4.2   |  30  |  0.84    Ca    8.4      18 Nov 2024 07:11  Phos  2.9     11-18  Mg     2.2     11-18    TPro  6.4  /  Alb  2.7[L]  /  TBili  0.4  /  DBili      /  AST  24  /  ALT  28  /  AlkPhos  107  11-18    Creatinine Trend: 0.84 <--, 0.88 <--, 0.79 <--, 0.74 <--, 0.81 <--, 0.68 <--, 0.75 <--                        12.5   5.62  )-----------( 406      ( 18 Nov 2024 07:11 )             37.4     Urine Studies:  Urinalysis Basic - ( 18 Nov 2024 07:11 )    Color:  / Appearance:  / SG:  / pH:   Gluc: 165 mg/dL / Ketone:   / Bili:  / Urobili:    Blood:  / Protein:  / Nitrite:    Leuk Esterase:  / RBC:  / WBC    Sq Epi:  / Non Sq Epi:  / Bacteria:       Triglycerides, Serum: 233 mg/dL (11.18.24 @ 07:11)

## 2024-11-18 NOTE — PROGRESS NOTE ADULT - ASSESSMENT
Patient is a 74y Male with h/o gastric ulcer (s/p partial gastrectomy and Bilroth II reconstruction in 1970s), VASILIY, HTN , s/p appendectomy (1980s), s/p hydrocele (1990s) recently admitted to [facility] 10/28-11/3 for SBO s/p Ex-lap, SHANON with small bowel resection on 10/28  presents with n/v and abd pain. Pt a/w recurrent SBO and Bacteremia. . Nutrition consulted for TPN.   Repeat BCX 11/8 NG  s/p EGD 11/13 with +Nasojejunal enteral tube placement    1. Severe PCM- ~5% wt loss in 1 week, NPO >1 week. (pt states he has not eaten in 2 weeks).  Naso-jejunal tube removed; BCx 11/8- NG. s/p IR PICC placed 11/15. Pt initiated on TPN on 11/16 due to prolonged NPO status.   c/w TPN with lipids @ 2L. Keep off IVF  FS acceptable. c/w FS q6hrs while on TPN. c/w ISS.   hgbA1C 5.9 on 11/14.   Triglycerides 233 on 11/18; c/w lipids.  Check CMP/Mg/Phos/ Ionized calcium in am.    Daily weights. Strict I/O. Hold TPN if pt spikes fever and check BCX x2    2. Hypophosphatemia- resolved with phos in TPN.  Monitor K/Mg/ Phos daily.   3. Bacteremia/ Enteritis- Pt on Ceftriaxone/ Flagyl. BCx+ 11/ 6- Bacteroides fragilis. Repeat BCx 11/8 NG. ID following  4. Essential HTN- BP acceptable. Pt on Hydralazine 10mg IV q 6hr prn SBP >160. Monitor BP  5. SBO- plan as per surgery       TPN orders    Weight 64.4 kg  Total Kcal 1800  Fat 50g = 500 kcal  Protein 78g = 312 kcal  Dextrose 290g = 988 kcal    [facility] NEPHROLOGY  Jose Angel Horner M.D.  Caden Heck D.O.  Amalia Allen M.D.  MD Eugenia Cazares, MSN, ANP-C    Telephone: [phone]  Facsimile: [phone]    [address] [place] [address], [address]  [place] [id number]

## 2024-11-18 NOTE — CHART NOTE - NSCHARTNOTEFT_GEN_A_CORE
Pt seen for TPN severe malnutrition follow up     Visited pt at bedside. Pt provided TPN with lipids 1L (half dose on first day) -11/16 per nutrition support doctor. TPN increased to 2L (full dose) No recent episodes of nausea, vomiting, diarrhea or constipation per pt. Last BM noted on ___ per pt. Denies any chewing/swallowing difficulties. Intake is ___% per pt. Food preferences explored and forwarded to dietary.    Diet Prescription: Diet, NPO (11-06-24 @ 15:17)    Pertinent Medications: MEDICATIONS  (STANDING):  benzocaine/menthol Lozenge 1 Lozenge Oral once  cefTRIAXone   IVPB 1000 milliGRAM(s) IV Intermittent every 24 hours  chlorhexidine 2% Cloths 1 Application(s) Topical daily  dextrose 5%. 1000 milliLiter(s) (100 mL/Hr) IV Continuous <Continuous>  dextrose 5%. 1000 milliLiter(s) (50 mL/Hr) IV Continuous <Continuous>  dextrose 50% Injectable 25 Gram(s) IV Push once  dextrose 50% Injectable 12.5 Gram(s) IV Push once  dextrose 50% Injectable 25 Gram(s) IV Push once  diatrizoate meglumine/diatrizoate sodium. 60 milliLiter(s) Oral once  enoxaparin Injectable 40 milliGRAM(s) SubCutaneous every 24 hours  glucagon  Injectable 1 milliGRAM(s) IntraMuscular once  insulin lispro (ADMELOG) corrective regimen sliding scale   SubCutaneous every 6 hours  lipid, fat emulsion (Fish Oil and Plant Based) 20% Infusion 0.7764 Gm/kG/Day (10.4 mL/Hr) IV Continuous <Continuous>  lipid, fat emulsion (Fish Oil and Plant Based) 20% Infusion 0.7764 Gm/kG/Day (10.4 mL/Hr) IV Continuous <Continuous>  metoclopramide Injectable 10 milliGRAM(s) IV Push every 8 hours  metroNIDAZOLE  IVPB 500 milliGRAM(s) IV Intermittent every 8 hours  pantoprazole  Injectable 40 milliGRAM(s) IV Push every 24 hours  Parenteral Nutrition - Adult 1 Each (83 mL/Hr) TPN Continuous <Continuous>  Parenteral Nutrition - Adult 1 Each (83 mL/Hr) TPN Continuous <Continuous>    MEDICATIONS  (PRN):  dextrose Oral Gel 15 Gram(s) Oral once PRN Blood Glucose LESS THAN 70 milliGRAM(s)/deciliter  hydrALAZINE Injectable 10 milliGRAM(s) IV Push every 6 hours PRN systolic BP > 160  ondansetron Injectable 4 milliGRAM(s) IV Push every 6 hours PRN Nausea  sodium chloride 0.9% lock flush 10 milliLiter(s) IV Push every 1 hour PRN Pre/post blood products, medications, blood draw, and to maintain line patency    Pertinent Labs: 11-18 Na140 mmol/L Glu 165 mg/dL[H] K+ 4.2 mmol/L Cr  0.84 mg/dL BUN 10 mg/dL 11-18 Phos 2.9 mg/dL 11-18 Alb 2.7 g/dL[L] 11-18 Chol --    LDL --    HDL --    Trig 233 mg/dL[H]     CAPILLARY BLOOD GLUCOSE      POCT Blood Glucose.: 156 mg/dL (18 Nov 2024 11:33)  POCT Blood Glucose.: 143 mg/dL (18 Nov 2024 05:38)  POCT Blood Glucose.: 162 mg/dL (17 Nov 2024 23:15)  POCT Blood Glucose.: 132 mg/dL (17 Nov 2024 17:39)      Weight:   Weight Assessment:  Height: in   IBW: lbs +/-10%  BMI: kg/m^2    Physical Assessment, per flowsheets:  Edema:  Pressure Injury:  Appearance:     Estimated Needs:   [X] No change since previous assessment  [ ] recalculated, with consideration for, based on weight    Previous Nutrition Diagnosis:   [ ] Inadequate Energy Intake [ ]Inadequate Oral Intake [ ] Excessive Energy Intake   [ ] Underweight [ ] Increased Nutrient Needs [ ] Overweight/Obesity   [ ] Altered GI Function [ ] Unintended Weight Loss [ ] Food & Nutrition Related Knowledge Deficit [ ] Malnutrition   Nutrition Diagnosis is [ ] ongoing  [ ] resolved [ ] not applicable   New Nutrition Diagnosis: [ ] not applicable     Education:  [ ] Provided  [ ] Provided on previous assessment by RD  [ ] Not applicable secondary to   [ ] Pt refused     Interventions:   1) Continue current diet as medically feasible.  2) Encourage PO intake and honor food preferences as able.  3) Monitor PO intake, labs, weights, BM's, and skin integrity.    Rolando Mitchell RD (Available on teams) Pt seen for TPN severe malnutrition follow up     Visited pt at bedside. Pt provided TPN with lipids 1L (half dose on first day) -11/16 per nutrition support doctor. TPN increased to 2L (full dose) - 11/17. Pt receiving goal rate TPN of 83.3ml/hr. No recent episodes of nausea, vomiting, diarrhea or constipation per pt. POCT levels 120-162 between 11/16 - 11/18, within normal limits. Noted with elevated triglycerides (233), spoke with nutrition support doctor, aware of triglyceride levels. RD to remain available.    Diet Prescription: Diet, NPO (11-06-24 @ 15:17)    Pertinent Medications: MEDICATIONS  (STANDING):  benzocaine/menthol Lozenge 1 Lozenge Oral once  cefTRIAXone   IVPB 1000 milliGRAM(s) IV Intermittent every 24 hours  chlorhexidine 2% Cloths 1 Application(s) Topical daily  dextrose 5%. 1000 milliLiter(s) (100 mL/Hr) IV Continuous <Continuous>  dextrose 5%. 1000 milliLiter(s) (50 mL/Hr) IV Continuous <Continuous>  dextrose 50% Injectable 25 Gram(s) IV Push once  dextrose 50% Injectable 12.5 Gram(s) IV Push once  dextrose 50% Injectable 25 Gram(s) IV Push once  diatrizoate meglumine/diatrizoate sodium. 60 milliLiter(s) Oral once  enoxaparin Injectable 40 milliGRAM(s) SubCutaneous every 24 hours  glucagon  Injectable 1 milliGRAM(s) IntraMuscular once  insulin lispro (ADMELOG) corrective regimen sliding scale   SubCutaneous every 6 hours  lipid, fat emulsion (Fish Oil and Plant Based) 20% Infusion 0.7764 Gm/kG/Day (10.4 mL/Hr) IV Continuous <Continuous>  lipid, fat emulsion (Fish Oil and Plant Based) 20% Infusion 0.7764 Gm/kG/Day (10.4 mL/Hr) IV Continuous <Continuous>  metoclopramide Injectable 10 milliGRAM(s) IV Push every 8 hours  metroNIDAZOLE  IVPB 500 milliGRAM(s) IV Intermittent every 8 hours  pantoprazole  Injectable 40 milliGRAM(s) IV Push every 24 hours  Parenteral Nutrition - Adult 1 Each (83 mL/Hr) TPN Continuous <Continuous>  Parenteral Nutrition - Adult 1 Each (83 mL/Hr) TPN Continuous <Continuous>    MEDICATIONS  (PRN):  dextrose Oral Gel 15 Gram(s) Oral once PRN Blood Glucose LESS THAN 70 milliGRAM(s)/deciliter  hydrALAZINE Injectable 10 milliGRAM(s) IV Push every 6 hours PRN systolic BP > 160  ondansetron Injectable 4 milliGRAM(s) IV Push every 6 hours PRN Nausea  sodium chloride 0.9% lock flush 10 milliLiter(s) IV Push every 1 hour PRN Pre/post blood products, medications, blood draw, and to maintain line patency    Pertinent Labs: 11-18 Na140 mmol/L Glu 165 mg/dL[H] K+ 4.2 mmol/L Cr  0.84 mg/dL BUN 10 mg/dL 11-18 Phos 2.9 mg/dL 11-18 Alb 2.7 g/dL[L] 11-18 Chol --    LDL --    HDL --    Trig 233 mg/dL[H]     CAPILLARY BLOOD GLUCOSE      POCT Blood Glucose.: 156 mg/dL (18 Nov 2024 11:33)  POCT Blood Glucose.: 143 mg/dL (18 Nov 2024 05:38)  POCT Blood Glucose.: 162 mg/dL (17 Nov 2024 23:15)  POCT Blood Glucose.: 132 mg/dL (17 Nov 2024 17:39)      Weight: 142 lbs  Height: 66 in   IBW: 142 lbs +/-10%  BMI: 22.9 kg/m^2    Physical Assessment, per flowsheets:  Edema: No edema noted per RN flowsheets   Pressure Injury: No pressure injuries noted per RN flowsheets     Estimated Needs:   [X] No change since previous assessment    Previous Nutrition Diagnosis: [X] Malnutrition   Nutrition Diagnosis is [X] ongoing   New Nutrition Diagnosis: [X] not applicable     Education:  [X] Provided on previous assessment by RD    Interventions:   1) Continue current TPN at goal rate as medically feasible.  2) Monitor TPN rate, labs, weights, BM's, I/O's, lipid labs, POCT, ALT/AST, bilirubin and skin integrity.    Rolando Mitchell RD (Available on teams)

## 2024-11-18 NOTE — PROGRESS NOTE ADULT - SUBJECTIVE AND OBJECTIVE BOX
74y Male is under our care for     MEDS:  cefTRIAXone   IVPB 1000 milliGRAM(s) IV Intermittent every 24 hours  metroNIDAZOLE  IVPB 500 milliGRAM(s) IV Intermittent every 8 hours    ALLERGIES: Allergies    No Known Allergies    Intolerances    REVIEW OF SYSTEMS:  [  ] Not able to elicit  General:	  Chest:	  GI:	  :  Skin:	  Musculoskeletal:	  Neuro:	    VITALS:  Vital Signs Last 24 Hrs  T(C): 37 (18 Nov 2024 04:56), Max: 37 (18 Nov 2024 04:56)  T(F): 98.6 (18 Nov 2024 04:56), Max: 98.6 (18 Nov 2024 04:56)  HR: 65 (18 Nov 2024 04:56) (65 - 68)  BP: 137/88 (18 Nov 2024 04:56) (134/85 - 137/88)  BP(mean): 101 (17 Nov 2024 20:49) (101 - 101)  RR: 18 (18 Nov 2024 04:56) (18 - 18)  SpO2: 97% (18 Nov 2024 04:56) (97% - 97%)    Parameters below as of 18 Nov 2024 04:56  Patient On (Oxygen Delivery Method): room air    PHYSICAL EXAM:  HEENT:  Neck:  Respiratory:  Cardiovascular:  Gastrointestinal:  :  Extremities:  Skin:  Ortho:  Neuro:    LABS/DIAGNOSTIC TESTS:                        12.5   5.62  )-----------( 406      ( 18 Nov 2024 07:11 )             37.4     WBC Count: 5.62 K/uL (11-18 @ 07:11)  WBC Count: 5.58 K/uL (11-17 @ 07:50)  WBC Count: 5.21 K/uL (11-16 @ 08:15)  WBC Count: 5.63 K/uL (11-15 @ 06:35)  WBC Count: 6.60 K/uL (11-14 @ 06:41)    11-18    140  |  106  |  10  ----------------------------<  165[H]  4.2   |  30  |  0.84    Ca    8.4      18 Nov 2024 07:11  Phos  2.9     11-18  Mg     2.2     11-18    TPro  6.4  /  Alb  2.7[L]  /  TBili  0.4  /  DBili  x   /  AST  24  /  ALT  28  /  AlkPhos  107  11-18    CULTURES:   .Blood BLOOD  11-08 @ 10:30   No growth at 5 days  --  --    .Blood BLOOD  11-06 @ 11:30   Growth in anaerobic bottle: Bacteroides fragilis "Susceptibilities not  performed"  Direct identification is available within approximately 3-5  hours either by Blood Panel Multiplexed PCR or Direct  MALDI-TOF. Details: [email or web address]  --  Blood Culture PCR    .Blood BLOOD  11-06 @ 11:20   Growth in anaerobic bottle: Bacteroides fragilis  "Susceptibilities not performed"  --    Growth in anaerobic bottle: Gram Negative Rods    Clean Catch Clean Catch (Midstream)  10-28 @ 12:30   <10,000 CFU/mL Normal Urogenital Merlene  --  --    RADIOLOGY:  no new studies 74y Male sitting up in the chair and in no acute distress. NGT in place. Denies nausea or vomiting. No BM but passing minimal flatus. Pain is well controlled. Has not had any fevers and wbc count is normal.     MEDS:  cefTRIAXone   IVPB 1000 milliGRAM(s) IV Intermittent every 24 hours  metroNIDAZOLE  IVPB 500 milliGRAM(s) IV Intermittent every 8 hours    ALLERGIES: Allergies    No Known Allergies    Intolerances    REVIEW OF SYSTEMS:  [  ] Not able to elicit  General: no fevers no malaise  Chest: no cough no sob  GI: no nvd  : no urinary sxs   Skin: no rashes  Musculoskeletal: no trauma no LBP  Neuro: no ha's no dizziness 	    VITALS:  Vital Signs Last 24 Hrs  T(C): 37 (18 Nov 2024 04:56), Max: 37 (18 Nov 2024 04:56)  T(F): 98.6 (18 Nov 2024 04:56), Max: 98.6 (18 Nov 2024 04:56)  HR: 65 (18 Nov 2024 04:56) (65 - 68)  BP: 137/88 (18 Nov 2024 04:56) (134/85 - 137/88)  BP(mean): 101 (17 Nov 2024 20:49) (101 - 101)  RR: 18 (18 Nov 2024 04:56) (18 - 18)  SpO2: 97% (18 Nov 2024 04:56) (97% - 97%)    Parameters below as of 18 Nov 2024 04:56  Patient On (Oxygen Delivery Method): room air    PHYSICAL EXAM:  HEENT: normocephalic, conjunctivae and sclerae clear; moist mucous membranes; NGT  Neck: supple no LN's   Respiratory: lungs clear no rales  Cardiovascular: S1 S2 reg no murmurs  Gastrointestinal: +BS with soft, nondistended abdomen; nontender; midline incision with Steri-strips.   Extremities: no edema; left arm PICC   Skin: no rashes  Ortho: no erythema or joint swelling  Neuro: AAO x 3    LABS/DIAGNOSTIC TESTS:                        12.5   5.62  )-----------( 406      ( 18 Nov 2024 07:11 )             37.4     WBC Count: 5.62 K/uL (11-18 @ 07:11)  WBC Count: 5.58 K/uL (11-17 @ 07:50)  WBC Count: 5.21 K/uL (11-16 @ 08:15)  WBC Count: 5.63 K/uL (11-15 @ 06:35)  WBC Count: 6.60 K/uL (11-14 @ 06:41)    11-18    140  |  106  |  10  ----------------------------<  165[H]  4.2   |  30  |  0.84    Ca    8.4      18 Nov 2024 07:11  Phos  2.9     11-18  Mg     2.2     11-18    TPro  6.4  /  Alb  2.7[L]  /  TBili  0.4  /  DBili  x   /  AST  24  /  ALT  28  /  AlkPhos  107  11-18    CULTURES:   .Blood BLOOD  11-08 @ 10:30   No growth at 5 days  --  --    .Blood BLOOD  11-06 @ 11:30   Growth in anaerobic bottle: Bacteroides fragilis "Susceptibilities not  performed"  Direct identification is available within approximately 3-5  hours either by Blood Panel Multiplexed PCR or Direct  MALDI-TOF. Details: [email or web address]  --  Blood Culture PCR    .Blood BLOOD  11-06 @ 11:20   Growth in anaerobic bottle: Bacteroides fragilis  "Susceptibilities not performed"  --    Growth in anaerobic bottle: Gram Negative Rods    Clean Catch Clean Catch (Midstream)  10-28 @ 12:30   <10,000 CFU/mL Normal Urogenital Merlene  --  --    RADIOLOGY:  no new studies 74y Male sitting up in the chair and in no acute distress. NGT in place. Denies nausea or vomiting. No BM but passing minimal flatus. Pain is well controlled. Has not had any fevers and wbc count is normal. He is on TPN for now.    MEDS:  cefTRIAXone   IVPB 1000 milliGRAM(s) IV Intermittent every 24 hours  metroNIDAZOLE  IVPB 500 milliGRAM(s) IV Intermittent every 8 hours    ALLERGIES: Allergies    No Known Allergies    Intolerances    REVIEW OF SYSTEMS:  [  ] Not able to elicit  General: no fevers no malaise  Chest: no cough no sob  GI: no nvd  : no urinary sxs   Skin: no rashes  Musculoskeletal: no trauma no LBP  Neuro: no ha's no dizziness 	    VITALS:  Vital Signs Last 24 Hrs  T(C): 37 (18 Nov 2024 04:56), Max: 37 (18 Nov 2024 04:56)  T(F): 98.6 (18 Nov 2024 04:56), Max: 98.6 (18 Nov 2024 04:56)  HR: 65 (18 Nov 2024 04:56) (65 - 68)  BP: 137/88 (18 Nov 2024 04:56) (134/85 - 137/88)  BP(mean): 101 (17 Nov 2024 20:49) (101 - 101)  RR: 18 (18 Nov 2024 04:56) (18 - 18)  SpO2: 97% (18 Nov 2024 04:56) (97% - 97%)    Parameters below as of 18 Nov 2024 04:56  Patient On (Oxygen Delivery Method): room air    PHYSICAL EXAM:  HEENT: normocephalic, conjunctivae and sclerae clear; moist mucous membranes; NGT  Neck: supple no LN's   Respiratory: lungs clear no rales  Cardiovascular: S1 S2 reg no murmurs  Gastrointestinal: +BS with soft, nondistended abdomen; nontender; midline incision with Steri-strips.   Extremities: no edema; left arm PICC   Skin: no rashes  Ortho: no erythema or joint swelling  Neuro: AAO x 3    LABS/DIAGNOSTIC TESTS:                        12.5   5.62  )-----------( 406      ( 18 Nov 2024 07:11 )             37.4     WBC Count: 5.62 K/uL (11-18 @ 07:11)  WBC Count: 5.58 K/uL (11-17 @ 07:50)  WBC Count: 5.21 K/uL (11-16 @ 08:15)  WBC Count: 5.63 K/uL (11-15 @ 06:35)  WBC Count: 6.60 K/uL (11-14 @ 06:41)    11-18    140  |  106  |  10  ----------------------------<  165[H]  4.2   |  30  |  0.84    Ca    8.4      18 Nov 2024 07:11  Phos  2.9     11-18  Mg     2.2     11-18    TPro  6.4  /  Alb  2.7[L]  /  TBili  0.4  /  DBili  x   /  AST  24  /  ALT  28  /  AlkPhos  107  11-18    CULTURES:   .Blood BLOOD  11-08 @ 10:30   No growth at 5 days  --  --    .Blood BLOOD  11-06 @ 11:30   Growth in anaerobic bottle: Bacteroides fragilis "Susceptibilities not  performed"  Direct identification is available within approximately 3-5  hours either by Blood Panel Multiplexed PCR or Direct  MALDI-TOF. Details: [email or web address]  --  Blood Culture PCR    .Blood BLOOD  11-06 @ 11:20   Growth in anaerobic bottle: Bacteroides fragilis  "Susceptibilities not performed"  --    Growth in anaerobic bottle: Gram Negative Rods    Clean Catch Clean Catch (Midstream)  10-28 @ 12:30   <10,000 CFU/mL Normal Urogenital Merlene  --  --    RADIOLOGY:  no new studies

## 2024-11-19 LAB
ALBUMIN SERPL ELPH-MCNC: 2.7 G/DL — LOW (ref 3.5–5)
ALP SERPL-CCNC: 105 U/L — SIGNIFICANT CHANGE UP (ref 40–120)
ALT FLD-CCNC: 27 U/L DA — SIGNIFICANT CHANGE UP (ref 10–60)
ANION GAP SERPL CALC-SCNC: 5 MMOL/L — SIGNIFICANT CHANGE UP (ref 5–17)
AST SERPL-CCNC: 22 U/L — SIGNIFICANT CHANGE UP (ref 10–40)
BASOPHILS # BLD AUTO: 0.07 K/UL — SIGNIFICANT CHANGE UP (ref 0–0.2)
BASOPHILS NFR BLD AUTO: 1 % — SIGNIFICANT CHANGE UP (ref 0–2)
BILIRUB SERPL-MCNC: 0.3 MG/DL — SIGNIFICANT CHANGE UP (ref 0.2–1.2)
BUN SERPL-MCNC: 14 MG/DL — SIGNIFICANT CHANGE UP (ref 7–18)
CA-I BLD-SCNC: 5 MG/DL — SIGNIFICANT CHANGE UP (ref 4.5–5.6)
CALCIUM SERPL-MCNC: 8.6 MG/DL — SIGNIFICANT CHANGE UP (ref 8.4–10.5)
CHLORIDE SERPL-SCNC: 108 MMOL/L — SIGNIFICANT CHANGE UP (ref 96–108)
CO2 SERPL-SCNC: 28 MMOL/L — SIGNIFICANT CHANGE UP (ref 22–31)
CREAT SERPL-MCNC: 0.8 MG/DL — SIGNIFICANT CHANGE UP (ref 0.5–1.3)
EGFR: 93 ML/MIN/1.73M2 — SIGNIFICANT CHANGE UP
EOSINOPHIL # BLD AUTO: 0.2 K/UL — SIGNIFICANT CHANGE UP (ref 0–0.5)
EOSINOPHIL NFR BLD AUTO: 3 % — SIGNIFICANT CHANGE UP (ref 0–6)
GLUCOSE BLDC GLUCOMTR-MCNC: 117 MG/DL — HIGH (ref 70–99)
GLUCOSE BLDC GLUCOMTR-MCNC: 133 MG/DL — HIGH (ref 70–99)
GLUCOSE BLDC GLUCOMTR-MCNC: 163 MG/DL — HIGH (ref 70–99)
GLUCOSE SERPL-MCNC: 166 MG/DL — HIGH (ref 70–99)
HCT VFR BLD CALC: 38.4 % — LOW (ref 39–50)
HGB BLD-MCNC: 12.9 G/DL — LOW (ref 13–17)
LG PLATELETS BLD QL AUTO: SLIGHT — SIGNIFICANT CHANGE UP
LYMPHOCYTES # BLD AUTO: 0.95 K/UL — LOW (ref 1–3.3)
LYMPHOCYTES # BLD AUTO: 14 % — SIGNIFICANT CHANGE UP (ref 13–44)
MAGNESIUM SERPL-MCNC: 2.1 MG/DL — SIGNIFICANT CHANGE UP (ref 1.6–2.6)
MANUAL SMEAR VERIFICATION: SIGNIFICANT CHANGE UP
MCHC RBC-ENTMCNC: 28.7 PG — SIGNIFICANT CHANGE UP (ref 27–34)
MCHC RBC-ENTMCNC: 33.6 G/DL — SIGNIFICANT CHANGE UP (ref 32–36)
MCV RBC AUTO: 85.5 FL — SIGNIFICANT CHANGE UP (ref 80–100)
METAMYELOCYTES # FLD: 3 % — HIGH (ref 0–0)
MONOCYTES # BLD AUTO: 0.82 K/UL — SIGNIFICANT CHANGE UP (ref 0–0.9)
MONOCYTES NFR BLD AUTO: 12 % — SIGNIFICANT CHANGE UP (ref 2–14)
MYELOCYTES NFR BLD: 5 % — HIGH (ref 0–0)
NEUTROPHILS # BLD AUTO: 4.08 K/UL — SIGNIFICANT CHANGE UP (ref 1.8–7.4)
NEUTROPHILS NFR BLD AUTO: 55 % — SIGNIFICANT CHANGE UP (ref 43–77)
NEUTS BAND # BLD: 5 % — SIGNIFICANT CHANGE UP (ref 0–8)
NRBC # BLD: 0 /100 WBCS — SIGNIFICANT CHANGE UP (ref 0–0)
PHOSPHATE SERPL-MCNC: 3 MG/DL — SIGNIFICANT CHANGE UP (ref 2.5–4.5)
PLAT MORPH BLD: NORMAL — SIGNIFICANT CHANGE UP
PLATELET # BLD AUTO: 364 K/UL — SIGNIFICANT CHANGE UP (ref 150–400)
PLATELET COUNT - ESTIMATE: NORMAL — SIGNIFICANT CHANGE UP
POTASSIUM SERPL-MCNC: 4.1 MMOL/L — SIGNIFICANT CHANGE UP (ref 3.5–5.3)
POTASSIUM SERPL-SCNC: 4.1 MMOL/L — SIGNIFICANT CHANGE UP (ref 3.5–5.3)
PROT SERPL-MCNC: 6.4 G/DL — SIGNIFICANT CHANGE UP (ref 6–8.3)
RBC # BLD: 4.49 M/UL — SIGNIFICANT CHANGE UP (ref 4.2–5.8)
RBC # FLD: 15.2 % — HIGH (ref 10.3–14.5)
RBC BLD AUTO: NORMAL — SIGNIFICANT CHANGE UP
SODIUM SERPL-SCNC: 141 MMOL/L — SIGNIFICANT CHANGE UP (ref 135–145)
VARIANT LYMPHS # BLD: 2 % — SIGNIFICANT CHANGE UP (ref 0–6)
WBC # BLD: 6.8 K/UL — SIGNIFICANT CHANGE UP (ref 3.8–10.5)
WBC # FLD AUTO: 6.8 K/UL — SIGNIFICANT CHANGE UP (ref 3.8–10.5)

## 2024-11-19 RX ORDER — SODIUM/POT/MAG/CALC/CHLOR/ACET 35-20-5MEQ
1 VIAL (ML) INTRAVENOUS
Refills: 0 | Status: DISCONTINUED | OUTPATIENT
Start: 2024-11-19 | End: 2024-11-19

## 2024-11-19 RX ORDER — FAT EMULSIONS 20 %
0.78 EMULSION INTRAVENOUS
Qty: 50 | Refills: 0 | Status: DISCONTINUED | OUTPATIENT
Start: 2024-11-19 | End: 2024-11-19

## 2024-11-19 RX ADMIN — METRONIDAZOLE 100 MILLIGRAM(S): 500 TABLET ORAL at 05:50

## 2024-11-19 RX ADMIN — Medication 1: at 05:50

## 2024-11-19 RX ADMIN — Medication 10.4 GM/KG/DAY: at 23:08

## 2024-11-19 RX ADMIN — METOCLOPRAMIDE HYDROCHLORIDE 10 MILLIGRAM(S): 10 TABLET ORAL at 22:41

## 2024-11-19 RX ADMIN — CHLORHEXIDINE GLUCONATE 1 APPLICATION(S): 1.2 RINSE ORAL at 12:22

## 2024-11-19 RX ADMIN — Medication 100 MILLIGRAM(S): at 09:11

## 2024-11-19 RX ADMIN — METRONIDAZOLE 100 MILLIGRAM(S): 500 TABLET ORAL at 14:12

## 2024-11-19 RX ADMIN — METOCLOPRAMIDE HYDROCHLORIDE 10 MILLIGRAM(S): 10 TABLET ORAL at 14:12

## 2024-11-19 RX ADMIN — PANTOPRAZOLE SODIUM 40 MILLIGRAM(S): 40 TABLET, DELAYED RELEASE ORAL at 18:16

## 2024-11-19 RX ADMIN — ENOXAPARIN SODIUM 40 MILLIGRAM(S): 30 INJECTION SUBCUTANEOUS at 15:18

## 2024-11-19 RX ADMIN — METOCLOPRAMIDE HYDROCHLORIDE 10 MILLIGRAM(S): 10 TABLET ORAL at 05:50

## 2024-11-19 RX ADMIN — METRONIDAZOLE 100 MILLIGRAM(S): 500 TABLET ORAL at 22:41

## 2024-11-19 RX ADMIN — Medication 1 EACH: at 23:08

## 2024-11-19 NOTE — PROGRESS NOTE ADULT - SUBJECTIVE AND OBJECTIVE BOX
Patient seen and examined at bedside. Patient with one episode of nausea and vomiting this morning however denies symptoms overnight. NGT remains to suction with 600cc bilious output overnight, 920cc in the last 24 hours. Denies any flatus or bowel movements. Denies worsening abdominal pain. Remains on TPN. Hemodynamically stable and afebrile overnight.     Vital Signs Last 24 Hrs  T(C): 37 (19 Nov 2024 05:54), Max: 37 (19 Nov 2024 05:54)  T(F): 98.6 (19 Nov 2024 05:54), Max: 98.6 (19 Nov 2024 05:54)  HR: 88 (19 Nov 2024 05:54) (65 - 88)  BP: 119/72 (19 Nov 2024 05:54) (118/80 - 134/81)  BP(mean): 99 (18 Nov 2024 21:09) (99 - 99)  RR: 18 (19 Nov 2024 05:54) (17 - 18)  SpO2: 97% (19 Nov 2024 05:54) (97% - 98%)    Parameters below as of 19 Nov 2024 05:54  Patient On (Oxygen Delivery Method): room air    PE  General: well appearing, no acute distress  Resp: Normal respiratory effort  CV: regular rate and rhythm   Abd: soft, nontender, nondistended. no guarding or rebound tenderness. Steristrips in place over midline incision, clean, dry, and intact.   : voiding spontaneously   MSK: FROM in bilateral extremities  Neuro: GCS15, sensation intact                           12.5   5.62  )-----------( 406      ( 18 Nov 2024 07:11 )             37.4   11-19    141  |  108  |  14  ----------------------------<  166[H]  4.1   |  28  |  0.80    Ca    8.6      19 Nov 2024 06:50  Phos  3.0     11-19  Mg     2.1     11-19    TPro  6.4  /  Alb  2.7[L]  /  TBili  0.3  /  DBili  x   /  AST  22  /  ALT  27  /  AlkPhos  105  11-19

## 2024-11-19 NOTE — PROGRESS NOTE ADULT - SUBJECTIVE AND OBJECTIVE BOX
74y Male    Meds:  cefTRIAXone   IVPB 1000 milliGRAM(s) IV Intermittent every 24 hours  metroNIDAZOLE  IVPB 500 milliGRAM(s) IV Intermittent every 8 hours    Allergies    No Known Allergies    Intolerances        VITALS:  Vital Signs Last 24 Hrs  T(C): 36.8 (19 Nov 2024 12:35), Max: 37 (19 Nov 2024 05:54)  T(F): 98.3 (19 Nov 2024 12:35), Max: 98.6 (19 Nov 2024 05:54)  HR: 65 (19 Nov 2024 12:35) (65 - 88)  BP: 123/74 (19 Nov 2024 12:35) (119/72 - 134/81)  BP(mean): 99 (18 Nov 2024 21:09) (99 - 99)  RR: 18 (19 Nov 2024 12:35) (17 - 18)  SpO2: 97% (19 Nov 2024 12:35) (97% - 98%)    Parameters below as of 19 Nov 2024 12:35  Patient On (Oxygen Delivery Method): room air        LABS/DIAGNOSTIC TESTS:                          12.9   6.80  )-----------( 364      ( 19 Nov 2024 06:50 )             38.4         11-19    141  |  108  |  14  ----------------------------<  166[H]  4.1   |  28  |  0.80    Ca    8.6      19 Nov 2024 06:50  Phos  3.0     11-19  Mg     2.1     11-19    TPro  6.4  /  Alb  2.7[L]  /  TBili  0.3  /  DBili  x   /  AST  22  /  ALT  27  /  AlkPhos  105  11-19      LIVER FUNCTIONS - ( 19 Nov 2024 06:50 )  Alb: 2.7 g/dL / Pro: 6.4 g/dL / ALK PHOS: 105 U/L / ALT: 27 U/L DA / AST: 22 U/L / GGT: x             CULTURES: .Blood BLOOD  11-08 @ 10:30   No growth at 5 days  --  --      .Blood BLOOD  11-06 @ 11:30   Growth in anaerobic bottle: Bacteroides fragilis "Susceptibilities not  performed"  Direct identification is available within approximately 3-5  hours either by Blood Panel Multiplexed PCR or Direct  MALDI-TOF. Details: [email or web address]  --  Blood Culture PCR      .Blood BLOOD  11-06 @ 11:20   Growth in anaerobic bottle: Bacteroides fragilis  "Susceptibilities not performed"  --    Growth in anaerobic bottle: Gram Negative Rods      Clean Catch Clean Catch (Midstream)  10-28 @ 12:30   <10,000 CFU/mL Normal Urogenital Merlene  --  --            RADIOLOGY:      ROS:  [  ] UNABLE TO ELICIT 74y Male who is doing a little better, he has no abdominal pain, no nausea or vomiting as he has his NGT in place, he states that he did pass a little flatus once so far, no BMs, no fevers or chills. He is getting TPN. His abdominal distention is down also.    Meds:  cefTRIAXone   IVPB 1000 milliGRAM(s) IV Intermittent every 24 hours  metroNIDAZOLE  IVPB 500 milliGRAM(s) IV Intermittent every 8 hours    Allergies    No Known Allergies    Intolerances        VITALS:  Vital Signs Last 24 Hrs  T(C): 36.8 (19 Nov 2024 12:35), Max: 37 (19 Nov 2024 05:54)  T(F): 98.3 (19 Nov 2024 12:35), Max: 98.6 (19 Nov 2024 05:54)  HR: 65 (19 Nov 2024 12:35) (65 - 88)  BP: 123/74 (19 Nov 2024 12:35) (119/72 - 134/81)  BP(mean): 99 (18 Nov 2024 21:09) (99 - 99)  RR: 18 (19 Nov 2024 12:35) (17 - 18)  SpO2: 97% (19 Nov 2024 12:35) (97% - 98%)    Parameters below as of 19 Nov 2024 12:35  Patient On (Oxygen Delivery Method): room air        LABS/DIAGNOSTIC TESTS:                          12.9   6.80  )-----------( 364      ( 19 Nov 2024 06:50 )             38.4         11-19    141  |  108  |  14  ----------------------------<  166[H]  4.1   |  28  |  0.80    Ca    8.6      19 Nov 2024 06:50  Phos  3.0     11-19  Mg     2.1     11-19    TPro  6.4  /  Alb  2.7[L]  /  TBili  0.3  /  DBili  x   /  AST  22  /  ALT  27  /  AlkPhos  105  11-19      LIVER FUNCTIONS - ( 19 Nov 2024 06:50 )  Alb: 2.7 g/dL / Pro: 6.4 g/dL / ALK PHOS: 105 U/L / ALT: 27 U/L DA / AST: 22 U/L / GGT: x             CULTURES: .Blood BLOOD  11-08 @ 10:30   No growth at 5 days  --  --      .Blood BLOOD  11-06 @ 11:30   Growth in anaerobic bottle: Bacteroides fragilis "Susceptibilities not  performed"  Direct identification is available within approximately 3-5  hours either by Blood Panel Multiplexed PCR or Direct  MALDI-TOF. Details: [email or web address]  --  Blood Culture PCR      .Blood BLOOD  11-06 @ 11:20   Growth in anaerobic bottle: Bacteroides fragilis  "Susceptibilities not performed"  --    Growth in anaerobic bottle: Gram Negative Rods      Clean Catch Clean Catch (Midstream)  10-28 @ 12:30   <10,000 CFU/mL Normal Urogenital Merlene  --  --            RADIOLOGY:      ROS:  [  ] UNABLE TO ELICIT

## 2024-11-19 NOTE — PROGRESS NOTE ADULT - ASSESSMENT
Patient is a 74y Male with h/o gastric ulcer (s/p partial gastrectomy and Bilroth II reconstruction in 1970s), VASILIY, HTN , s/p appendectomy (1980s), s/p hydrocele (1990s) recently admitted to [facility] 10/28-11/3 for SBO s/p Ex-lap, SHANON with small bowel resection on 10/28  presents with n/v and abd pain. Pt a/w recurrent SBO and Bacteremia. . Nutrition consulted for TPN.   Repeat BCX 11/8 NG  s/p EGD 11/13 with +Nasojejunal enteral tube placement    1. Severe PCM- + wt loss, NPO since 11/6 (pt states he has not eaten even 1 week PTA).  Naso-jejunal tube removed; BCx 11/8- NG. s/p IR PICC placed 11/15. Pt initiated on TPN on 11/16 due to prolonged NPO status.   c/w TPN with lipids @ 2L. Keep off IVF  FS acceptable. c/w FS q6hrs while on TPN. c/w ISS.   hgbA1C 5.9 on 11/14.   Triglycerides 233 on 11/18; c/w lipids.  LFTs wnl. Check CMP/Mg/Phos/ Ionized calcium in am.    Daily weights. Strict I/O. Hold TPN if pt spikes fever and check BCX x2    2. Hypophosphatemia- resolved with phos in TPN.  Monitor K/Mg/ Phos daily.   3. Bacteremia/ Enteritis- Pt on Ceftriaxone/ Flagyl. BCx+ 11/ 6- Bacteroides fragilis. Repeat BCx 11/8 NG. ID following  4. Essential HTN- BP acceptable. Pt on Hydralazine 10mg IV q 6hr prn SBP >160. Monitor BP  5. SBO- plan as per surgery       TPN orders    Weight 64.4 kg  Total Kcal 1800  Fat 50g = 500 kcal  Protein 78g = 312 kcal  Dextrose 290g = 988 kcal    [facility] NEPHROLOGY  Jose Angel Horner M.D.  Caden Heck D.O.  Amalia Allen M.D.  MD Eugenia Cazares, MSN, ANP-C    Telephone: [phone]  Facsimile: [phone]    [address] [place] [address], [address]  [place] [id number]

## 2024-11-19 NOTE — PROGRESS NOTE ADULT - ASSESSMENT
Bacteremia - with Bacteroides  SBO    Plan -   ·	Cont Rocephin 1gm iv qd  ·	Cont Flagyl 500mgs iv q8hrs   ·	DC all antibiotics tomorrow  ·	will monitor to make sure no fevers on TPN develop.

## 2024-11-19 NOTE — PROGRESS NOTE ADULT - ASSESSMENT
74M  s/p exlap lysis of adhesions, small bowel resection readmitted with recurrent SBO.  s/p EGD on 11/13; stricture was found which was dilated, feeding tube passed through that portion and subsequently removed 11/16   NGT replaced    L PICC placed 11/15    Plan  -Multimodal pain control PRN   -Planned for daily clamp trial however given high NGT output and episode of vomiting, continue NGT to LIS and NPO today   -Continue TPN  -Replete electrolytes PRN   -Monitor NGT output   -TPN via left PICC per nephro   -Daily OOBTC and continue ambulation   -Continue Lovenox for dvt ppx   -Monitor return bowel function  -Continue IV abx, f/u ID recommendations

## 2024-11-19 NOTE — PROGRESS NOTE ADULT - SUBJECTIVE AND OBJECTIVE BOX
[facility] NEPHROLOGY- PROGRESS NOTE    Patient is a 74y Male with h/o gastric ulcer (s/p partial gastrectomy and Bilroth II reconstruction in 1970s), VASILIY, HTN , s/p appendectomy (1980s), s/p hydrocele (1990s) recently admitted to [facility] 10/28-11/3 for SBO s/p Ex-lap, SHANON with small bowel resection on 10/28  presents with n/v and abd pain. Pt a/w recurrent SBO and Bacteremia. . Nutrition consulted for TPN.   Repeat BCX 11/8 NG  s/p EGD 11/13 with +Nasojejunal enteral tube placement  However NJ tube kinked and removed on 11/15.   s/p IR LUE PICC placed on 11/15    Hospital Medications: Medications reviewed.  REVIEW OF SYSTEMS:  CONSTITUTIONAL: No fevers or chills  RESPIRATORY: No shortness of breath  CARDIOVASCULAR: No chest pain.  GASTROINTESTINAL: No nausea, or vomiting, or abdominal pain. No BM or flatulence  VASCULAR: No bilateral lower extremity edema.     VITALS:  T(F): 98.6 (11-19-24 @ 05:54), Max: 98.6 (11-19-24 @ 05:54)  HR: 88 (11-19-24 @ 05:54)  BP: 119/72 (11-19-24 @ 05:54)  RR: 18 (11-19-24 @ 05:54)  SpO2: 97% (11-19-24 @ 05:54)  Wt(kg): --    11-18 @ 07:01  -  11-19 @ 07:00  --------------------------------------------------------  IN: 0 mL / OUT: 3170 mL / NET: -3170 mL        PHYSICAL EXAM:  Constitutional: NAD,  HEENT: anicteric sclera, +NGT to suction  Neck: No JVD  Respiratory: CTA b/l  Cardiovascular: S1, S2, RRR  Gastrointestinal: soft, midline staples    Extremities:  No peripheral edema  Access: Left PICC line- intact    LABS:  11-19    141  |  108  |  14  ----------------------------<  166[H]  4.1   |  28  |  0.80    Ca    8.6      19 Nov 2024 06:50  Phos  3.0     11-19  Mg     2.1     11-19    TPro  6.4  /  Alb  2.7[L]  /  TBili  0.3  /  DBili      /  AST  22  /  ALT  27  /  AlkPhos  105  11-19    Creatinine Trend: 0.80 <--, 0.84 <--, 0.88 <--, 0.79 <--, 0.74 <--, 0.81 <--, 0.68 <--                        12.9   6.80  )-----------( 364      ( 19 Nov 2024 06:50 )             38.4     Urine Studies:  Urinalysis Basic - ( 19 Nov 2024 06:50 )    Color:  / Appearance:  / SG:  / pH:   Gluc: 166 mg/dL / Ketone:   / Bili:  / Urobili:    Blood:  / Protein:  / Nitrite:    Leuk Esterase:  / RBC:  / WBC    Sq Epi:  / Non Sq Epi:  / Bacteria:

## 2024-11-20 LAB
ALBUMIN SERPL ELPH-MCNC: 2.7 G/DL — LOW (ref 3.5–5)
ALP SERPL-CCNC: 102 U/L — SIGNIFICANT CHANGE UP (ref 40–120)
ALT FLD-CCNC: 30 U/L DA — SIGNIFICANT CHANGE UP (ref 10–60)
ANION GAP SERPL CALC-SCNC: 7 MMOL/L — SIGNIFICANT CHANGE UP (ref 5–17)
AST SERPL-CCNC: 33 U/L — SIGNIFICANT CHANGE UP (ref 10–40)
BASOPHILS # BLD AUTO: 0.05 K/UL — SIGNIFICANT CHANGE UP (ref 0–0.2)
BASOPHILS NFR BLD AUTO: 0.7 % — SIGNIFICANT CHANGE UP (ref 0–2)
BILIRUB SERPL-MCNC: 0.3 MG/DL — SIGNIFICANT CHANGE UP (ref 0.2–1.2)
BUN SERPL-MCNC: 14 MG/DL — SIGNIFICANT CHANGE UP (ref 7–18)
CA-I BLD-SCNC: 5 MG/DL — SIGNIFICANT CHANGE UP (ref 4.5–5.6)
CALCIUM SERPL-MCNC: 8.4 MG/DL — SIGNIFICANT CHANGE UP (ref 8.4–10.5)
CHLORIDE SERPL-SCNC: 108 MMOL/L — SIGNIFICANT CHANGE UP (ref 96–108)
CO2 SERPL-SCNC: 25 MMOL/L — SIGNIFICANT CHANGE UP (ref 22–31)
CREAT SERPL-MCNC: 0.84 MG/DL — SIGNIFICANT CHANGE UP (ref 0.5–1.3)
EGFR: 92 ML/MIN/1.73M2 — SIGNIFICANT CHANGE UP
EOSINOPHIL # BLD AUTO: 0.18 K/UL — SIGNIFICANT CHANGE UP (ref 0–0.5)
EOSINOPHIL NFR BLD AUTO: 2.4 % — SIGNIFICANT CHANGE UP (ref 0–6)
GLUCOSE BLDC GLUCOMTR-MCNC: 144 MG/DL — HIGH (ref 70–99)
GLUCOSE BLDC GLUCOMTR-MCNC: 152 MG/DL — HIGH (ref 70–99)
GLUCOSE BLDC GLUCOMTR-MCNC: 156 MG/DL — HIGH (ref 70–99)
GLUCOSE BLDC GLUCOMTR-MCNC: 170 MG/DL — HIGH (ref 70–99)
GLUCOSE BLDC GLUCOMTR-MCNC: 192 MG/DL — HIGH (ref 70–99)
GLUCOSE SERPL-MCNC: 189 MG/DL — HIGH (ref 70–99)
HCT VFR BLD CALC: 38.1 % — LOW (ref 39–50)
HGB BLD-MCNC: 12.6 G/DL — LOW (ref 13–17)
IMM GRANULOCYTES NFR BLD AUTO: 2.6 % — HIGH (ref 0–0.9)
LYMPHOCYTES # BLD AUTO: 0.93 K/UL — LOW (ref 1–3.3)
LYMPHOCYTES # BLD AUTO: 12.2 % — LOW (ref 13–44)
MAGNESIUM SERPL-MCNC: 2 MG/DL — SIGNIFICANT CHANGE UP (ref 1.6–2.6)
MCHC RBC-ENTMCNC: 28.3 PG — SIGNIFICANT CHANGE UP (ref 27–34)
MCHC RBC-ENTMCNC: 33.1 G/DL — SIGNIFICANT CHANGE UP (ref 32–36)
MCV RBC AUTO: 85.6 FL — SIGNIFICANT CHANGE UP (ref 80–100)
MONOCYTES # BLD AUTO: 0.68 K/UL — SIGNIFICANT CHANGE UP (ref 0–0.9)
MONOCYTES NFR BLD AUTO: 8.9 % — SIGNIFICANT CHANGE UP (ref 2–14)
NEUTROPHILS # BLD AUTO: 5.56 K/UL — SIGNIFICANT CHANGE UP (ref 1.8–7.4)
NEUTROPHILS NFR BLD AUTO: 73.2 % — SIGNIFICANT CHANGE UP (ref 43–77)
NRBC # BLD: 0 /100 WBCS — SIGNIFICANT CHANGE UP (ref 0–0)
PHOSPHATE SERPL-MCNC: 2.8 MG/DL — SIGNIFICANT CHANGE UP (ref 2.5–4.5)
PLATELET # BLD AUTO: 321 K/UL — SIGNIFICANT CHANGE UP (ref 150–400)
POTASSIUM SERPL-MCNC: 3.6 MMOL/L — SIGNIFICANT CHANGE UP (ref 3.5–5.3)
POTASSIUM SERPL-SCNC: 3.6 MMOL/L — SIGNIFICANT CHANGE UP (ref 3.5–5.3)
PROT SERPL-MCNC: 6.1 G/DL — SIGNIFICANT CHANGE UP (ref 6–8.3)
RBC # BLD: 4.45 M/UL — SIGNIFICANT CHANGE UP (ref 4.2–5.8)
RBC # FLD: 15.1 % — HIGH (ref 10.3–14.5)
SODIUM SERPL-SCNC: 140 MMOL/L — SIGNIFICANT CHANGE UP (ref 135–145)
WBC # BLD: 7.6 K/UL — SIGNIFICANT CHANGE UP (ref 3.8–10.5)
WBC # FLD AUTO: 7.6 K/UL — SIGNIFICANT CHANGE UP (ref 3.8–10.5)

## 2024-11-20 RX ORDER — FAT EMULSIONS 20 %
0.78 EMULSION INTRAVENOUS
Qty: 50 | Refills: 0 | Status: DISCONTINUED | OUTPATIENT
Start: 2024-11-20 | End: 2024-11-20

## 2024-11-20 RX ORDER — SODIUM/POT/MAG/CALC/CHLOR/ACET 35-20-5MEQ
1 VIAL (ML) INTRAVENOUS
Refills: 0 | Status: DISCONTINUED | OUTPATIENT
Start: 2024-11-20 | End: 2024-11-20

## 2024-11-20 RX ORDER — CEFTRIAXONE SODIUM 1 G
1000 VIAL (EA) INJECTION EVERY 24 HOURS
Refills: 0 | Status: DISCONTINUED | OUTPATIENT
Start: 2024-11-20 | End: 2024-11-20

## 2024-11-20 RX ADMIN — Medication 10.4 GM/KG/DAY: at 23:07

## 2024-11-20 RX ADMIN — Medication 1: at 14:01

## 2024-11-20 RX ADMIN — METRONIDAZOLE 100 MILLIGRAM(S): 500 TABLET ORAL at 14:03

## 2024-11-20 RX ADMIN — Medication 100 MILLIGRAM(S): at 11:04

## 2024-11-20 RX ADMIN — Medication 1 EACH: at 23:27

## 2024-11-20 RX ADMIN — Medication 1: at 05:50

## 2024-11-20 RX ADMIN — PANTOPRAZOLE SODIUM 40 MILLIGRAM(S): 40 TABLET, DELAYED RELEASE ORAL at 19:07

## 2024-11-20 RX ADMIN — Medication 1: at 01:02

## 2024-11-20 RX ADMIN — METOCLOPRAMIDE HYDROCHLORIDE 10 MILLIGRAM(S): 10 TABLET ORAL at 23:13

## 2024-11-20 RX ADMIN — CHLORHEXIDINE GLUCONATE 1 APPLICATION(S): 1.2 RINSE ORAL at 13:01

## 2024-11-20 RX ADMIN — ENOXAPARIN SODIUM 40 MILLIGRAM(S): 30 INJECTION SUBCUTANEOUS at 16:02

## 2024-11-20 RX ADMIN — METRONIDAZOLE 100 MILLIGRAM(S): 500 TABLET ORAL at 05:12

## 2024-11-20 RX ADMIN — METOCLOPRAMIDE HYDROCHLORIDE 10 MILLIGRAM(S): 10 TABLET ORAL at 05:12

## 2024-11-20 RX ADMIN — Medication 1: at 19:07

## 2024-11-20 RX ADMIN — METOCLOPRAMIDE HYDROCHLORIDE 10 MILLIGRAM(S): 10 TABLET ORAL at 14:05

## 2024-11-20 NOTE — CHART NOTE - NSCHARTNOTEFT_GEN_A_CORE
Pt seen for TPN malnutrition follow up     Spoke to pt at bedside. Pt receiving lipids 2L, currently on goal rate of 83.3 ml/hr. No recent episodes of vomiting, abd pain or diarrhea per pt. Pt reported experiencing nausea from having a dry throat and coughing, 920cc output in last 24hr -11/19. POCT 117 - 170 between 11/18 -11/20, within normal limits. RD to remain available.    Diet Prescription: Diet, NPO (11-06-24 @ 15:17)    Pertinent Medications: MEDICATIONS  (STANDING):  benzocaine/menthol Lozenge 1 Lozenge Oral once  cefTRIAXone   IVPB 1000 milliGRAM(s) IV Intermittent every 24 hours  chlorhexidine 2% Cloths 1 Application(s) Topical daily  dextrose 5%. 1000 milliLiter(s) (50 mL/Hr) IV Continuous <Continuous>  dextrose 5%. 1000 milliLiter(s) (100 mL/Hr) IV Continuous <Continuous>  dextrose 50% Injectable 25 Gram(s) IV Push once  dextrose 50% Injectable 12.5 Gram(s) IV Push once  dextrose 50% Injectable 25 Gram(s) IV Push once  diatrizoate meglumine/diatrizoate sodium. 60 milliLiter(s) Oral once  enoxaparin Injectable 40 milliGRAM(s) SubCutaneous every 24 hours  glucagon  Injectable 1 milliGRAM(s) IntraMuscular once  insulin lispro (ADMELOG) corrective regimen sliding scale   SubCutaneous every 6 hours  lipid, fat emulsion (Fish Oil and Plant Based) 20% Infusion 0.7764 Gm/kG/Day (10.4 mL/Hr) IV Continuous <Continuous>  lipid, fat emulsion (Fish Oil and Plant Based) 20% Infusion 0.7764 Gm/kG/Day (10.4 mL/Hr) IV Continuous <Continuous>  metoclopramide Injectable 10 milliGRAM(s) IV Push every 8 hours  metroNIDAZOLE  IVPB 500 milliGRAM(s) IV Intermittent every 8 hours  pantoprazole  Injectable 40 milliGRAM(s) IV Push every 24 hours  Parenteral Nutrition - Adult 1 Each (83 mL/Hr) TPN Continuous <Continuous>  Parenteral Nutrition - Adult 1 Each (83 mL/Hr) TPN Continuous <Continuous>    MEDICATIONS  (PRN):  dextrose Oral Gel 15 Gram(s) Oral once PRN Blood Glucose LESS THAN 70 milliGRAM(s)/deciliter  hydrALAZINE Injectable 10 milliGRAM(s) IV Push every 6 hours PRN systolic BP > 160  ondansetron Injectable 4 milliGRAM(s) IV Push every 6 hours PRN Nausea  sodium chloride 0.9% lock flush 10 milliLiter(s) IV Push every 1 hour PRN Pre/post blood products, medications, blood draw, and to maintain line patency    Pertinent Labs: 11-20 Na140 mmol/L Glu 189 mg/dL[H] K+ 3.6 mmol/L Cr  0.84 mg/dL BUN 14 mg/dL 11-20 Phos 2.8 mg/dL 11-20 Alb 2.7 g/dL[L] 11-18 Chol --    LDL --    HDL --    Trig 233 mg/dL[H]     CAPILLARY BLOOD GLUCOSE      POCT Blood Glucose.: 170 mg/dL (20 Nov 2024 04:51)  POCT Blood Glucose.: 156 mg/dL (20 Nov 2024 00:09)  POCT Blood Glucose.: 117 mg/dL (19 Nov 2024 18:18)  POCT Blood Glucose.: 133 mg/dL (19 Nov 2024 11:54)      Weight: 142 lbs  Height: 66 in   IBW: 142 lbs +/-10%  BMI: 22.9 kg/m^2    Physical Assessment, per flowsheets:  Edema: No edema noted per RN flowsheets   Pressure Injury: No pressure injuries noted per RN flowsheets     Estimated Needs:   [X] No change since previous assessment    Previous Nutrition Diagnosis: [X] Malnutrition   Nutrition Diagnosis is [X] ongoing   New Nutrition Diagnosis: [X] not applicable     Education:  [X] Provided on previous assessment by RD    Interventions:   1) Continue current TPN at goal rate as medically feasible.  2) Monitor TPN rate, weights, BM's, I/O's, lipid labs, POCT, ALT/AST, bilirubin and skin integrity.      Rolando Mitchell RD (Available on teams) Pt seen for TPN malnutrition follow up     Spoke to pt at bedside. Pt receiving lipids 2L, currently on goal rate of 83.3 ml/hr. No recent episodes of vomiting, abd pain or diarrhea per pt/RN. Pt reported experiencing nausea from having a dry throat and coughing, 920cc output in last 24hr -11/19. POCT 117 - 170 between 11/18 -11/20, within normal limits. RD to remain available.    Diet Prescription: Diet, NPO (11-06-24 @ 15:17)    Pertinent Medications: MEDICATIONS  (STANDING):  benzocaine/menthol Lozenge 1 Lozenge Oral once  cefTRIAXone   IVPB 1000 milliGRAM(s) IV Intermittent every 24 hours  chlorhexidine 2% Cloths 1 Application(s) Topical daily  dextrose 5%. 1000 milliLiter(s) (50 mL/Hr) IV Continuous <Continuous>  dextrose 5%. 1000 milliLiter(s) (100 mL/Hr) IV Continuous <Continuous>  dextrose 50% Injectable 25 Gram(s) IV Push once  dextrose 50% Injectable 12.5 Gram(s) IV Push once  dextrose 50% Injectable 25 Gram(s) IV Push once  diatrizoate meglumine/diatrizoate sodium. 60 milliLiter(s) Oral once  enoxaparin Injectable 40 milliGRAM(s) SubCutaneous every 24 hours  glucagon  Injectable 1 milliGRAM(s) IntraMuscular once  insulin lispro (ADMELOG) corrective regimen sliding scale   SubCutaneous every 6 hours  lipid, fat emulsion (Fish Oil and Plant Based) 20% Infusion 0.7764 Gm/kG/Day (10.4 mL/Hr) IV Continuous <Continuous>  lipid, fat emulsion (Fish Oil and Plant Based) 20% Infusion 0.7764 Gm/kG/Day (10.4 mL/Hr) IV Continuous <Continuous>  metoclopramide Injectable 10 milliGRAM(s) IV Push every 8 hours  metroNIDAZOLE  IVPB 500 milliGRAM(s) IV Intermittent every 8 hours  pantoprazole  Injectable 40 milliGRAM(s) IV Push every 24 hours  Parenteral Nutrition - Adult 1 Each (83 mL/Hr) TPN Continuous <Continuous>  Parenteral Nutrition - Adult 1 Each (83 mL/Hr) TPN Continuous <Continuous>    MEDICATIONS  (PRN):  dextrose Oral Gel 15 Gram(s) Oral once PRN Blood Glucose LESS THAN 70 milliGRAM(s)/deciliter  hydrALAZINE Injectable 10 milliGRAM(s) IV Push every 6 hours PRN systolic BP > 160  ondansetron Injectable 4 milliGRAM(s) IV Push every 6 hours PRN Nausea  sodium chloride 0.9% lock flush 10 milliLiter(s) IV Push every 1 hour PRN Pre/post blood products, medications, blood draw, and to maintain line patency    Pertinent Labs: 11-20 Na140 mmol/L Glu 189 mg/dL[H] K+ 3.6 mmol/L Cr  0.84 mg/dL BUN 14 mg/dL 11-20 Phos 2.8 mg/dL 11-20 Alb 2.7 g/dL[L] 11-18 Chol --    LDL --    HDL --    Trig 233 mg/dL[H]     CAPILLARY BLOOD GLUCOSE      POCT Blood Glucose.: 170 mg/dL (20 Nov 2024 04:51)  POCT Blood Glucose.: 156 mg/dL (20 Nov 2024 00:09)  POCT Blood Glucose.: 117 mg/dL (19 Nov 2024 18:18)  POCT Blood Glucose.: 133 mg/dL (19 Nov 2024 11:54)      Weight: 142 lbs  Height: 66 in   IBW: 142 lbs +/-10%  BMI: 22.9 kg/m^2    Physical Assessment, per flowsheets:  Edema: No edema noted per RN flowsheets   Pressure Injury: No pressure injuries noted per RN flowsheets     Estimated Needs:   [X] No change since previous assessment    Previous Nutrition Diagnosis: [X] Malnutrition   Nutrition Diagnosis is [X] ongoing   New Nutrition Diagnosis: [X] not applicable     Education:  [X] Provided on previous assessment by RD    Interventions:   1) Continue current TPN at goal rate as medically feasible.  2) Monitor TPN rate, weights, BM's, I/O's, lipid labs, POCT, ALT/AST, bilirubin and skin integrity.      Rolando Mitchell RD (Available on teams) Pt seen for TPN malnutrition follow up     Spoke to pt at bedside. Pt receiving 2L TPN solution, currently on goal rate of 83.3 ml/hr. No recent episodes of vomiting, abd pain or diarrhea per pt/RN. Pt reported experiencing nausea from having a dry throat and coughing, 920cc output in last 24hr -11/19. POCT 117 - 170 between 11/18 -11/20, within normal limits. RD to remain available.    Diet Prescription: Diet, NPO (11-06-24 @ 15:17)    Pertinent Medications: MEDICATIONS  (STANDING):  benzocaine/menthol Lozenge 1 Lozenge Oral once  cefTRIAXone   IVPB 1000 milliGRAM(s) IV Intermittent every 24 hours  chlorhexidine 2% Cloths 1 Application(s) Topical daily  dextrose 5%. 1000 milliLiter(s) (50 mL/Hr) IV Continuous <Continuous>  dextrose 5%. 1000 milliLiter(s) (100 mL/Hr) IV Continuous <Continuous>  dextrose 50% Injectable 25 Gram(s) IV Push once  dextrose 50% Injectable 12.5 Gram(s) IV Push once  dextrose 50% Injectable 25 Gram(s) IV Push once  diatrizoate meglumine/diatrizoate sodium. 60 milliLiter(s) Oral once  enoxaparin Injectable 40 milliGRAM(s) SubCutaneous every 24 hours  glucagon  Injectable 1 milliGRAM(s) IntraMuscular once  insulin lispro (ADMELOG) corrective regimen sliding scale   SubCutaneous every 6 hours  lipid, fat emulsion (Fish Oil and Plant Based) 20% Infusion 0.7764 Gm/kG/Day (10.4 mL/Hr) IV Continuous <Continuous>  lipid, fat emulsion (Fish Oil and Plant Based) 20% Infusion 0.7764 Gm/kG/Day (10.4 mL/Hr) IV Continuous <Continuous>  metoclopramide Injectable 10 milliGRAM(s) IV Push every 8 hours  metroNIDAZOLE  IVPB 500 milliGRAM(s) IV Intermittent every 8 hours  pantoprazole  Injectable 40 milliGRAM(s) IV Push every 24 hours  Parenteral Nutrition - Adult 1 Each (83 mL/Hr) TPN Continuous <Continuous>  Parenteral Nutrition - Adult 1 Each (83 mL/Hr) TPN Continuous <Continuous>    MEDICATIONS  (PRN):  dextrose Oral Gel 15 Gram(s) Oral once PRN Blood Glucose LESS THAN 70 milliGRAM(s)/deciliter  hydrALAZINE Injectable 10 milliGRAM(s) IV Push every 6 hours PRN systolic BP > 160  ondansetron Injectable 4 milliGRAM(s) IV Push every 6 hours PRN Nausea  sodium chloride 0.9% lock flush 10 milliLiter(s) IV Push every 1 hour PRN Pre/post blood products, medications, blood draw, and to maintain line patency    Pertinent Labs: 11-20 Na140 mmol/L Glu 189 mg/dL[H] K+ 3.6 mmol/L Cr  0.84 mg/dL BUN 14 mg/dL 11-20 Phos 2.8 mg/dL 11-20 Alb 2.7 g/dL[L] 11-18 Chol --    LDL --    HDL --    Trig 233 mg/dL[H]     CAPILLARY BLOOD GLUCOSE      POCT Blood Glucose.: 170 mg/dL (20 Nov 2024 04:51)  POCT Blood Glucose.: 156 mg/dL (20 Nov 2024 00:09)  POCT Blood Glucose.: 117 mg/dL (19 Nov 2024 18:18)  POCT Blood Glucose.: 133 mg/dL (19 Nov 2024 11:54)      Weight: 142 lbs  Height: 66 in   IBW: 142 lbs +/-10%  BMI: 22.9 kg/m^2    Physical Assessment, per flowsheets:  Edema: No edema noted per RN flowsheets   Pressure Injury: No pressure injuries noted per RN flowsheets     Estimated Needs:   [X] No change since previous assessment    Previous Nutrition Diagnosis: [X] Malnutrition   Nutrition Diagnosis is [X] ongoing   New Nutrition Diagnosis: [X] not applicable     Education:  [X] Provided on previous assessment by RD    Interventions:   1) Continue current TPN at goal rate as medically feasible.  2) Monitor TPN rate, weights, BM's, I/O's, lipid labs, POCT, ALT/AST, bilirubin and skin integrity.      Rolando Mitchell RD (Available on teams)

## 2024-11-20 NOTE — PROGRESS NOTE ADULT - ASSESSMENT
Patient is a 74y Male with h/o gastric ulcer (s/p partial gastrectomy and Bilroth II reconstruction in 1970s), VASILIY, HTN , s/p appendectomy (1980s), s/p hydrocele (1990s) recently admitted to [facility] 10/28-11/3 for SBO s/p Ex-lap, SHANON with small bowel resection on 10/28  presents with n/v and abd pain. Pt a/w recurrent SBO and Bacteremia. . Nutrition consulted for TPN.   Repeat BCX 11/8 NG  s/p EGD 11/13 with +Nasojejunal enteral tube placement    1. Severe PCM- + wt loss, NPO since 11/6 (pt states he has not eaten even 1 week PTA).  Naso-jejunal tube removed; BCx 11/8- NG. s/p IR PICC placed 11/15. Pt initiated on TPN on 11/16 due to prolonged NPO status.   c/w TPN with lipids @ 2L. Keep off IVF  FS acceptable. c/w FS q6hrs while on TPN. c/w ISS.  hgbA1C 5.9 on 11/14.   Triglycerides 233 on 11/18; c/w lipids.  LFTs wnl. Check CMP/Mg/Phos/ Ionized calcium in am.  Check TG in am.   Daily weights. Strict I/O. Hold TPN if pt spikes fever and check BCX x2    2. Hypophosphatemia- resolved with phos in TPN.  Monitor K/Mg/ Phos daily.   3. Bacteremia/ Enteritis- Pt on Ceftriaxone/ Flagyl. BCx+ 11/ 6- Bacteroides fragilis. Repeat BCx 11/8 NG. ID following  4. Essential HTN- BP low normal. Pt on Hydralazine 10mg IV q 6hr prn SBP >160. Monitor BP  5. SBO- plan as per surgery       TPN orders    Weight 64.4 kg  Total Kcal 1800  Fat 50g = 500 kcal  Protein 78g = 312 kcal  Dextrose 290g = 988 kcal    [facility] NEPHROLOGY  Jose Angel Horner M.D.  Caden Heck D.O.  Amalia Allen M.D.  MD Eugenia Cazares, MSN, ANP-C    Telephone: [phone]  Facsimile: [phone]    [address] [place] [address], [address]  [place] [id number]

## 2024-11-20 NOTE — PROGRESS NOTE ADULT - SUBJECTIVE AND OBJECTIVE BOX
[facility] NEPHROLOGY- PROGRESS NOTE    Patient is a 74y Male with h/o gastric ulcer (s/p partial gastrectomy and Bilroth II reconstruction in 1970s), VASILIY, HTN , s/p appendectomy (1980s), s/p hydrocele (1990s) recently admitted to [facility] 10/28-11/3 for SBO s/p Ex-lap, SHANON with small bowel resection on 10/28  presents with n/v and abd pain. Pt a/w recurrent SBO and Bacteremia. . Nutrition consulted for TPN.   Repeat BCX 11/8 NG  s/p EGD 11/13 with +Nasojejunal enteral tube placement  However NJ tube kinked and removed on 11/15.   s/p IR LUE PICC placed on 11/15    Hospital Medications: Medications reviewed.  REVIEW OF SYSTEMS:  CONSTITUTIONAL: No fevers or chills  RESPIRATORY: No shortness of breath  CARDIOVASCULAR: No chest pain.  GASTROINTESTINAL: +nausea with spitting up clear phelghm, No vomiting, or abdominal pain. No BM or flatulence  VASCULAR: No bilateral lower extremity edema.     VITALS:  T(F): 97.5 (11-20-24 @ 05:55), Max: 98.3 (11-19-24 @ 12:35)  HR: 68 (11-20-24 @ 05:55)  BP: 101/56 (11-20-24 @ 05:55)  RR: 18 (11-20-24 @ 05:55)  SpO2: 98% (11-20-24 @ 05:55)  Wt(kg): --    11-19 @ 07:01  -  11-20 @ 07:00  --------------------------------------------------------  IN: 0 mL / OUT: 350 mL / NET: -350 mL        PHYSICAL EXAM:  Constitutional: NAD,  HEENT: anicteric sclera, +NGT to suction  Neck: No JVD  Respiratory: CTA b/l  Cardiovascular: S1, S2, RRR  Gastrointestinal: soft, midline staples    Extremities:  No peripheral edema  Access: Left PICC line- intact    LABS:  11-20    140  |  108  |  14  ----------------------------<  189[H]  3.6   |  25  |  0.84    Ca    8.4      20 Nov 2024 06:08  Phos  2.8     11-20  Mg     2.0     11-20    TPro  6.1  /  Alb  2.7[L]  /  TBili  0.3  /  DBili      /  AST  33  /  ALT  30  /  AlkPhos  102  11-20    Creatinine Trend: 0.84 <--, 0.80 <--, 0.84 <--, 0.88 <--, 0.79 <--, 0.74 <--, 0.81 <--                        12.6   7.60  )-----------( 321      ( 20 Nov 2024 06:08 )             38.1     Urine Studies:  Urinalysis Basic - ( 20 Nov 2024 06:08 )    Color:  / Appearance:  / SG:  / pH:   Gluc: 189 mg/dL / Ketone:   / Bili:  / Urobili:    Blood:  / Protein:  / Nitrite:    Leuk Esterase:  / RBC:  / WBC    Sq Epi:  / Non Sq Epi:  / Bacteria:

## 2024-11-20 NOTE — PROGRESS NOTE ADULT - ASSESSMENT
74M  s/p exlap lysis of adhesions, small bowel resection readmitted with recurrent SBO.  s/p EGD on 11/13; stricture was found which was dilated, feeding tube passed through that portion and subsequently removed 11/16   NGT replaced    L PICC placed 11/15    Plan  -Multimodal pain control PRN  -NGT to LIS, plan for daily clamp trials   -Monitor NGT output   -Continue TPN  -Replete electrolytes PRN   -TPN via left PICC per nephro   -Daily OOBTC and continue ambulation   -Continue Lovenox for dvt ppx   -Monitor return bowel function  -Continue IV abx, f/u ID recommendations

## 2024-11-20 NOTE — PROGRESS NOTE ADULT - SUBJECTIVE AND OBJECTIVE BOX
Patient seen and examined at bedside. No acute overnight events. Patient reports intermittent nausea. Denies vomiting. Denies any bowel function. 8 Hour clamp trial completed yesterday with initial residual of bilious content followed by additional 300cc overnight. Denies any worsening abdominal pain. WBC 7.60 from 6.80. Hemoglobin 12.6 from 12.9. Hemodynamically stable and afebrile.     Vital Signs Last 24 Hrs  T(C): 36.4 (20 Nov 2024 05:55), Max: 36.8 (19 Nov 2024 12:35)  T(F): 97.5 (20 Nov 2024 05:55), Max: 98.3 (19 Nov 2024 12:35)  HR: 68 (20 Nov 2024 05:55) (65 - 97)  BP: 101/56 (20 Nov 2024 05:55) (101/56 - 123/74)  BP(mean): 71 (20 Nov 2024 05:55) (71 - 71)  RR: 18 (20 Nov 2024 05:55) (18 - 18)  SpO2: 98% (20 Nov 2024 05:55) (97% - 98%)    Parameters below as of 20 Nov 2024 05:55  Patient On (Oxygen Delivery Method): room air    I&O's Summary    19 Nov 2024 07:01  -  20 Nov 2024 07:00  --------------------------------------------------------  IN: 0 mL / OUT: 350 mL / NET: -350 mL      PE  General: well appearing, no acute distress, NGT in place to LIS, bilious content visualized in cannister  Resp: Normal respiratory effort  CV: regular rate and rhythm   Abd: soft, nontender, nondistended, midline incision with Steri-strips in place, well appearing no strikethrough   : voiding spontaneously   MSK: FROM in bilateral extremities  Neuro: GCS15, sensation intact bilaterally                          12.6   7.60  )-----------( 321      ( 20 Nov 2024 06:08 )             38.1   11-20    140  |  108  |  14  ----------------------------<  189[H]  3.6   |  25  |  0.84    Ca    8.4      20 Nov 2024 06:08  Phos  2.8     11-20  Mg     2.0     11-20    TPro  6.1  /  Alb  2.7[L]  /  TBili  0.3  /  DBili  x   /  AST  33  /  ALT  30  /  AlkPhos  102  11-20

## 2024-11-21 LAB
ALBUMIN SERPL ELPH-MCNC: 2.9 G/DL — LOW (ref 3.5–5)
ALP SERPL-CCNC: 113 U/L — SIGNIFICANT CHANGE UP (ref 40–120)
ALT FLD-CCNC: 36 U/L DA — SIGNIFICANT CHANGE UP (ref 10–60)
ANION GAP SERPL CALC-SCNC: 7 MMOL/L — SIGNIFICANT CHANGE UP (ref 5–17)
AST SERPL-CCNC: 33 U/L — SIGNIFICANT CHANGE UP (ref 10–40)
BASOPHILS # BLD AUTO: 0.06 K/UL — SIGNIFICANT CHANGE UP (ref 0–0.2)
BASOPHILS NFR BLD AUTO: 0.8 % — SIGNIFICANT CHANGE UP (ref 0–2)
BILIRUB SERPL-MCNC: 0.3 MG/DL — SIGNIFICANT CHANGE UP (ref 0.2–1.2)
BUN SERPL-MCNC: 14 MG/DL — SIGNIFICANT CHANGE UP (ref 7–18)
CA-I BLD-SCNC: 5 MG/DL — SIGNIFICANT CHANGE UP (ref 4.5–5.6)
CALCIUM SERPL-MCNC: 8.5 MG/DL — SIGNIFICANT CHANGE UP (ref 8.4–10.5)
CHLORIDE SERPL-SCNC: 108 MMOL/L — SIGNIFICANT CHANGE UP (ref 96–108)
CO2 SERPL-SCNC: 26 MMOL/L — SIGNIFICANT CHANGE UP (ref 22–31)
CREAT SERPL-MCNC: 0.82 MG/DL — SIGNIFICANT CHANGE UP (ref 0.5–1.3)
EGFR: 92 ML/MIN/1.73M2 — SIGNIFICANT CHANGE UP
EOSINOPHIL # BLD AUTO: 0.22 K/UL — SIGNIFICANT CHANGE UP (ref 0–0.5)
EOSINOPHIL NFR BLD AUTO: 3 % — SIGNIFICANT CHANGE UP (ref 0–6)
GLUCOSE BLDC GLUCOMTR-MCNC: 154 MG/DL — HIGH (ref 70–99)
GLUCOSE BLDC GLUCOMTR-MCNC: 168 MG/DL — HIGH (ref 70–99)
GLUCOSE BLDC GLUCOMTR-MCNC: 175 MG/DL — HIGH (ref 70–99)
GLUCOSE BLDC GLUCOMTR-MCNC: 175 MG/DL — HIGH (ref 70–99)
GLUCOSE SERPL-MCNC: 159 MG/DL — HIGH (ref 70–99)
HCT VFR BLD CALC: 38.7 % — LOW (ref 39–50)
HGB BLD-MCNC: 13.1 G/DL — SIGNIFICANT CHANGE UP (ref 13–17)
IMM GRANULOCYTES NFR BLD AUTO: 2.4 % — HIGH (ref 0–0.9)
LYMPHOCYTES # BLD AUTO: 0.91 K/UL — LOW (ref 1–3.3)
LYMPHOCYTES # BLD AUTO: 12.3 % — LOW (ref 13–44)
MAGNESIUM SERPL-MCNC: 2 MG/DL — SIGNIFICANT CHANGE UP (ref 1.6–2.6)
MCHC RBC-ENTMCNC: 28.9 PG — SIGNIFICANT CHANGE UP (ref 27–34)
MCHC RBC-ENTMCNC: 33.9 G/DL — SIGNIFICANT CHANGE UP (ref 32–36)
MCV RBC AUTO: 85.4 FL — SIGNIFICANT CHANGE UP (ref 80–100)
MONOCYTES # BLD AUTO: 0.92 K/UL — HIGH (ref 0–0.9)
MONOCYTES NFR BLD AUTO: 12.5 % — SIGNIFICANT CHANGE UP (ref 2–14)
NEUTROPHILS # BLD AUTO: 5.08 K/UL — SIGNIFICANT CHANGE UP (ref 1.8–7.4)
NEUTROPHILS NFR BLD AUTO: 69 % — SIGNIFICANT CHANGE UP (ref 43–77)
NRBC # BLD: 0 /100 WBCS — SIGNIFICANT CHANGE UP (ref 0–0)
PHOSPHATE SERPL-MCNC: 3.1 MG/DL — SIGNIFICANT CHANGE UP (ref 2.5–4.5)
PLATELET # BLD AUTO: 310 K/UL — SIGNIFICANT CHANGE UP (ref 150–400)
POTASSIUM SERPL-MCNC: 3.9 MMOL/L — SIGNIFICANT CHANGE UP (ref 3.5–5.3)
POTASSIUM SERPL-SCNC: 3.9 MMOL/L — SIGNIFICANT CHANGE UP (ref 3.5–5.3)
PROT SERPL-MCNC: 6.5 G/DL — SIGNIFICANT CHANGE UP (ref 6–8.3)
RBC # BLD: 4.53 M/UL — SIGNIFICANT CHANGE UP (ref 4.2–5.8)
RBC # FLD: 15.3 % — HIGH (ref 10.3–14.5)
SODIUM SERPL-SCNC: 141 MMOL/L — SIGNIFICANT CHANGE UP (ref 135–145)
TRIGL SERPL-MCNC: 232 MG/DL — HIGH
WBC # BLD: 7.37 K/UL — SIGNIFICANT CHANGE UP (ref 3.8–10.5)
WBC # FLD AUTO: 7.37 K/UL — SIGNIFICANT CHANGE UP (ref 3.8–10.5)

## 2024-11-21 RX ORDER — PANTOPRAZOLE SODIUM 40 MG/1
40 TABLET, DELAYED RELEASE ORAL EVERY 24 HOURS
Refills: 0 | Status: DISCONTINUED | OUTPATIENT
Start: 2024-11-21 | End: 2024-11-25

## 2024-11-21 RX ORDER — FAT EMULSIONS 20 %
0.78 EMULSION INTRAVENOUS
Qty: 50 | Refills: 0 | Status: DISCONTINUED | OUTPATIENT
Start: 2024-11-21 | End: 2024-11-21

## 2024-11-21 RX ORDER — SODIUM/POT/MAG/CALC/CHLOR/ACET 35-20-5MEQ
1 VIAL (ML) INTRAVENOUS
Refills: 0 | Status: DISCONTINUED | OUTPATIENT
Start: 2024-11-21 | End: 2024-11-21

## 2024-11-21 RX ADMIN — Medication 83 EACH: at 22:18

## 2024-11-21 RX ADMIN — ENOXAPARIN SODIUM 40 MILLIGRAM(S): 30 INJECTION SUBCUTANEOUS at 15:02

## 2024-11-21 RX ADMIN — Medication 1: at 17:23

## 2024-11-21 RX ADMIN — Medication 1: at 12:00

## 2024-11-21 RX ADMIN — Medication 10.4 GM/KG/DAY: at 22:19

## 2024-11-21 RX ADMIN — METOCLOPRAMIDE HYDROCHLORIDE 10 MILLIGRAM(S): 10 TABLET ORAL at 05:40

## 2024-11-21 RX ADMIN — Medication 1: at 05:39

## 2024-11-21 RX ADMIN — Medication 1: at 23:33

## 2024-11-21 RX ADMIN — METOCLOPRAMIDE HYDROCHLORIDE 10 MILLIGRAM(S): 10 TABLET ORAL at 15:02

## 2024-11-21 RX ADMIN — PANTOPRAZOLE SODIUM 40 MILLIGRAM(S): 40 TABLET, DELAYED RELEASE ORAL at 11:57

## 2024-11-21 RX ADMIN — CHLORHEXIDINE GLUCONATE 1 APPLICATION(S): 1.2 RINSE ORAL at 15:02

## 2024-11-21 NOTE — PROGRESS NOTE ADULT - ASSESSMENT
Patient is a 74y Male with h/o gastric ulcer (s/p partial gastrectomy and Bilroth II reconstruction in 1970s), VASILIY, HTN , s/p appendectomy (1980s), s/p hydrocele (1990s) recently admitted to [facility] 10/28-11/3 for SBO s/p Ex-lap, SHANON with small bowel resection on 10/28  presents with n/v and abd pain. Pt a/w recurrent SBO and Bacteremia. . Nutrition consulted for TPN.   Repeat BCX 11/8 NG  s/p EGD 11/13 with +Nasojejunal enteral tube placement    1. Severe PCM- + wt loss, NPO since 11/6 (pt states he has not eaten even 1 week PTA).  Naso-jejunal tube removed; BCx 11/8- NG. s/p IR PICC placed 11/15. Pt initiated on TPN on 11/16 due to prolonged NPO status.   c/w TPN with lipids @ 2L. Keep off IVF  FS acceptable. c/w FS q6hrs while on TPN. c/w ISS.  hgbA1C 5.9 on 11/14.   Triglycerides 232 on 11/21; c/w lipids.  LFTs wnl. Check CMP/Mg/Phos/ Ionized calcium in am.     Daily weights. Strict I/O. Hold TPN if pt spikes fever and check BCX x2    2. Hypophosphatemia- resolved with phos in TPN.  Monitor K/Mg/ Phos daily.   3. Bacteremia/ Enteritis- Finished Ceftriaxone/ Flagyl. BCx+ 11/ 6- Bacteroides fragilis. Repeat BCx 11/8 NG. ID following  4. Essential HTN- BP low normal. Pt on Hydralazine 10mg IV q 6hr prn SBP >160. Monitor BP  5. SBO- plan as per surgery       TPN orders    Weight 64.4 kg  Total Kcal 1800  Fat 50g = 500 kcal  Protein 78g = 312 kcal  Dextrose 290g = 988 kcal    [facility] NEPHROLOGY  Jose Angel Horner M.D.  Caden Heck D.O.  Amalia Allen M.D.  MD Eugenia Cazares, MSN, ANP-C    Telephone: [phone]  Facsimile: [phone]    [address] [place] [address], [address]  [place] [id number]

## 2024-11-21 NOTE — PROGRESS NOTE ADULT - SUBJECTIVE AND OBJECTIVE BOX
[facility] NEPHROLOGY- PROGRESS NOTE    Patient is a 74y Male with h/o gastric ulcer (s/p partial gastrectomy and Bilroth II reconstruction in 1970s), VASILIY, HTN , s/p appendectomy (1980s), s/p hydrocele (1990s) recently admitted to [facility] 10/28-11/3 for SBO s/p Ex-lap, SHANON with small bowel resection on 10/28  presents with n/v and abd pain. Pt a/w recurrent SBO and Bacteremia. . Nutrition consulted for TPN.   Repeat BCX 11/8 NG  s/p EGD 11/13 with +Nasojejunal enteral tube placement  However NJ tube kinked and removed on 11/15.   s/p IR LUE PICC placed on 11/15    Hospital Medications: Medications reviewed.  REVIEW OF SYSTEMS:  CONSTITUTIONAL: No fevers or chills  RESPIRATORY: No shortness of breath  CARDIOVASCULAR: No chest pain.  GASTROINTESTINAL: +BM today. No nausea, vomiting, or abdominal pain.    VASCULAR: No bilateral lower extremity edema.     VITALS:  T(F): 98.1 (11-21-24 @ 12:30), Max: 98.7 (11-20-24 @ 21:06)  HR: 69 (11-21-24 @ 12:30)  BP: 110/70 (11-21-24 @ 12:30)  RR: 18 (11-21-24 @ 12:30)  SpO2: 97% (11-21-24 @ 12:30)  Wt(kg): --    11-20 @ 07:01  -  11-21 @ 07:00  --------------------------------------------------------  IN: 0 mL / OUT: 2860 mL / NET: -2860 mL    11-21 @ 07:01  -  11-21 @ 16:41  --------------------------------------------------------  IN: 0 mL / OUT: 500 mL / NET: -500 mL        PHYSICAL EXAM:  Constitutional: NAD,  HEENT: anicteric sclera, +NGT clamped  Neck: No JVD  Respiratory: CTA b/l  Cardiovascular: S1, S2, RRR  Gastrointestinal: soft, midline staples    Extremities:  No peripheral edema  Access: Left PICC line- intact    LABS:  11-21    141  |  108  |  14  ----------------------------<  159[H]  3.9   |  26  |  0.82    Ca    8.5      21 Nov 2024 06:50  Phos  3.1     11-21  Mg     2.0     11-21    TPro  6.5  /  Alb  2.9[L]  /  TBili  0.3  /  DBili      /  AST  33  /  ALT  36  /  AlkPhos  113  11-21    Creatinine Trend: 0.82 <--, 0.84 <--, 0.80 <--, 0.84 <--, 0.88 <--, 0.79 <--, 0.74 <--                        13.1   7.37  )-----------( 310      ( 21 Nov 2024 06:50 )             38.7     Urine Studies:  Urinalysis Basic - ( 21 Nov 2024 06:50 )    Color:  / Appearance:  / SG:  / pH:   Gluc: 159 mg/dL / Ketone:   / Bili:  / Urobili:    Blood:  / Protein:  / Nitrite:    Leuk Esterase:  / RBC:  / WBC    Sq Epi:  / Non Sq Epi:  / Bacteria:

## 2024-11-21 NOTE — PROGRESS NOTE ADULT - SUBJECTIVE AND OBJECTIVE BOX
Patient seen and examined at bedside. No acute overnight events. Clamp trial completed overnight, patient tolerated without worsening in nausea. 300cc output overnight when unclamped. Denies nausea or vomiting overnight. Denies abdominal pain. No return in bowel function. Remains on TPN. Ambulating well. Pending laboratory studies today. Hemodynamically stable and afebrile.     Vital Signs Last 24 Hrs  T(C): 36.8 (21 Nov 2024 05:04), Max: 37.1 (20 Nov 2024 21:06)  T(F): 98.3 (21 Nov 2024 05:04), Max: 98.7 (20 Nov 2024 21:06)  HR: 70 (21 Nov 2024 05:04) (70 - 76)  BP: 123/78 (21 Nov 2024 05:04) (111/73 - 123/78)  BP(mean): 76 (20 Nov 2024 21:06) (76 - 86)  RR: 18 (21 Nov 2024 05:04) (16 - 18)  SpO2: 97% (21 Nov 2024 05:04) (97% - 98%)    Parameters below as of 21 Nov 2024 05:04  Patient On (Oxygen Delivery Method): room air    I&O's Summary    20 Nov 2024 07:01  -  21 Nov 2024 07:00  --------------------------------------------------------  IN: 0 mL / OUT: 2860 mL / NET: -2860 mL      PE  General: well appearing, no acute distress, NGT in place to LIS, bilious content visualized in cannister approximately 300cc  Resp: Normal respiratory effort  CV: regular rate and rhythm   Abd: soft, nontender, nondistended, midline incision with Steri-strips in place, well appearing no strikethrough   : voiding spontaneously   MSK: FROM in bilateral extremities  Neuro: GCS15, sensation intact bilaterally                          12.6   7.60  )-----------( 321      ( 20 Nov 2024 06:08 )             38.1   11-20    140  |  108  |  14  ----------------------------<  189[H]  3.6   |  25  |  0.84    Ca    8.4      20 Nov 2024 06:08  Phos  2.8     11-20  Mg     2.0     11-20    TPro  6.1  /  Alb  2.7[L]  /  TBili  0.3  /  DBili  x   /  AST  33  /  ALT  30  /  AlkPhos  102  11-20

## 2024-11-21 NOTE — PROGRESS NOTE ADULT - ASSESSMENT
74M  s/p exlap lysis of adhesions, small bowel resection readmitted with recurrent SBO.  s/p EGD on 11/13; stricture was found which was dilated, feeding tube passed through that portion and subsequently removed 11/16   NGT replaced    L PICC placed 11/15    Plan  -Multimodal pain control PRN  -NGT to LIS, plan for daily clamp trials   -Monitor NGT output   -Continue TPN, per nephro   -TPN via left PICC  -Replete electrolytes PRN    -Daily OOBTC and continue ambulation   -Continue Lovenox for dvt ppx   -Monitor return bowel function  -Continue IV abx, f/u ID recommendations for antibiotic duration  74M  s/p exlap lysis of adhesions, small bowel resection readmitted with recurrent SBO.  s/p EGD on 11/13; stricture was found which was dilated, feeding tube passed through that portion and subsequently removed 11/16   NGT replaced    L PICC placed 11/15    Plan  -Multimodal pain control PRN  -NGT to LIS, plan for daily clamp trials   -Monitor NGT output   -Continue TPN, per nephro   -TPN via left PICC  -Replete electrolytes PRN    -Daily OOBTC and continue ambulation   -Continue Lovenox for dvt ppx   -Monitor return bowel function  -Completed course of IV ABx per ID

## 2024-11-22 LAB
ALBUMIN SERPL ELPH-MCNC: 2.8 G/DL — LOW (ref 3.5–5)
ALP SERPL-CCNC: 125 U/L — HIGH (ref 40–120)
ALT FLD-CCNC: 37 U/L DA — SIGNIFICANT CHANGE UP (ref 10–60)
ANION GAP SERPL CALC-SCNC: 4 MMOL/L — LOW (ref 5–17)
ANISOCYTOSIS BLD QL: SLIGHT — SIGNIFICANT CHANGE UP
AST SERPL-CCNC: 35 U/L — SIGNIFICANT CHANGE UP (ref 10–40)
BASOPHILS # BLD AUTO: 0.07 K/UL — SIGNIFICANT CHANGE UP (ref 0–0.2)
BASOPHILS NFR BLD AUTO: 1 % — SIGNIFICANT CHANGE UP (ref 0–2)
BILIRUB SERPL-MCNC: 0.4 MG/DL — SIGNIFICANT CHANGE UP (ref 0.2–1.2)
BUN SERPL-MCNC: 14 MG/DL — SIGNIFICANT CHANGE UP (ref 7–18)
CALCIUM SERPL-MCNC: 8.7 MG/DL — SIGNIFICANT CHANGE UP (ref 8.4–10.5)
CHLORIDE SERPL-SCNC: 108 MMOL/L — SIGNIFICANT CHANGE UP (ref 96–108)
CO2 SERPL-SCNC: 28 MMOL/L — SIGNIFICANT CHANGE UP (ref 22–31)
CREAT SERPL-MCNC: 0.79 MG/DL — SIGNIFICANT CHANGE UP (ref 0.5–1.3)
EGFR: 93 ML/MIN/1.73M2 — SIGNIFICANT CHANGE UP
EOSINOPHIL # BLD AUTO: 0.13 K/UL — SIGNIFICANT CHANGE UP (ref 0–0.5)
EOSINOPHIL NFR BLD AUTO: 2 % — SIGNIFICANT CHANGE UP (ref 0–6)
GLUCOSE BLDC GLUCOMTR-MCNC: 146 MG/DL — HIGH (ref 70–99)
GLUCOSE BLDC GLUCOMTR-MCNC: 163 MG/DL — HIGH (ref 70–99)
GLUCOSE BLDC GLUCOMTR-MCNC: 168 MG/DL — HIGH (ref 70–99)
GLUCOSE BLDC GLUCOMTR-MCNC: 194 MG/DL — HIGH (ref 70–99)
GLUCOSE SERPL-MCNC: 194 MG/DL — HIGH (ref 70–99)
HCT VFR BLD CALC: 39.3 % — SIGNIFICANT CHANGE UP (ref 39–50)
HGB BLD-MCNC: 13.1 G/DL — SIGNIFICANT CHANGE UP (ref 13–17)
LYMPHOCYTES # BLD AUTO: 0.98 K/UL — LOW (ref 1–3.3)
LYMPHOCYTES # BLD AUTO: 15 % — SIGNIFICANT CHANGE UP (ref 13–44)
MAGNESIUM SERPL-MCNC: 2 MG/DL — SIGNIFICANT CHANGE UP (ref 1.6–2.6)
MANUAL SMEAR VERIFICATION: SIGNIFICANT CHANGE UP
MCHC RBC-ENTMCNC: 28.6 PG — SIGNIFICANT CHANGE UP (ref 27–34)
MCHC RBC-ENTMCNC: 33.3 G/DL — SIGNIFICANT CHANGE UP (ref 32–36)
MCV RBC AUTO: 85.8 FL — SIGNIFICANT CHANGE UP (ref 80–100)
MONOCYTES # BLD AUTO: 0.2 K/UL — SIGNIFICANT CHANGE UP (ref 0–0.9)
MONOCYTES NFR BLD AUTO: 3 % — SIGNIFICANT CHANGE UP (ref 2–14)
NEUTROPHILS # BLD AUTO: 4.3 K/UL — SIGNIFICANT CHANGE UP (ref 1.8–7.4)
NEUTROPHILS NFR BLD AUTO: 64 % — SIGNIFICANT CHANGE UP (ref 43–77)
NEUTS BAND # BLD: 2 % — SIGNIFICANT CHANGE UP (ref 0–8)
NRBC # BLD: 0 /100 WBCS — SIGNIFICANT CHANGE UP (ref 0–0)
OVALOCYTES BLD QL SMEAR: SLIGHT — SIGNIFICANT CHANGE UP
PHOSPHATE SERPL-MCNC: 3.3 MG/DL — SIGNIFICANT CHANGE UP (ref 2.5–4.5)
PLAT MORPH BLD: NORMAL — SIGNIFICANT CHANGE UP
PLATELET # BLD AUTO: 278 K/UL — SIGNIFICANT CHANGE UP (ref 150–400)
PLATELET COUNT - ESTIMATE: NORMAL — SIGNIFICANT CHANGE UP
POTASSIUM SERPL-MCNC: 4.2 MMOL/L — SIGNIFICANT CHANGE UP (ref 3.5–5.3)
POTASSIUM SERPL-SCNC: 4.2 MMOL/L — SIGNIFICANT CHANGE UP (ref 3.5–5.3)
PROT SERPL-MCNC: 6.6 G/DL — SIGNIFICANT CHANGE UP (ref 6–8.3)
RBC # BLD: 4.58 M/UL — SIGNIFICANT CHANGE UP (ref 4.2–5.8)
RBC # FLD: 15.4 % — HIGH (ref 10.3–14.5)
RBC BLD AUTO: ABNORMAL
SODIUM SERPL-SCNC: 140 MMOL/L — SIGNIFICANT CHANGE UP (ref 135–145)
VARIANT LYMPHS # BLD: 13 % — HIGH (ref 0–6)
WBC # BLD: 6.52 K/UL — SIGNIFICANT CHANGE UP (ref 3.8–10.5)
WBC # FLD AUTO: 6.52 K/UL — SIGNIFICANT CHANGE UP (ref 3.8–10.5)

## 2024-11-22 PROCEDURE — 74250 X-RAY XM SM INT 1CNTRST STD: CPT | Mod: 26

## 2024-11-22 RX ORDER — SODIUM/POT/MAG/CALC/CHLOR/ACET 35-20-5MEQ
1 VIAL (ML) INTRAVENOUS
Refills: 0 | Status: DISCONTINUED | OUTPATIENT
Start: 2024-11-22 | End: 2024-11-23

## 2024-11-22 RX ORDER — FAT EMULSIONS 20 %
0.78 EMULSION INTRAVENOUS
Qty: 50 | Refills: 0 | Status: DISCONTINUED | OUTPATIENT
Start: 2024-11-22 | End: 2024-11-22

## 2024-11-22 RX ADMIN — Medication 10.4 GM/KG/DAY: at 22:27

## 2024-11-22 RX ADMIN — PANTOPRAZOLE SODIUM 40 MILLIGRAM(S): 40 TABLET, DELAYED RELEASE ORAL at 12:00

## 2024-11-22 RX ADMIN — CHLORHEXIDINE GLUCONATE 1 APPLICATION(S): 1.2 RINSE ORAL at 13:00

## 2024-11-22 RX ADMIN — Medication 1: at 05:59

## 2024-11-22 RX ADMIN — Medication 83 EACH: at 22:30

## 2024-11-22 RX ADMIN — Medication 1: at 18:40

## 2024-11-22 RX ADMIN — METOCLOPRAMIDE HYDROCHLORIDE 10 MILLIGRAM(S): 10 TABLET ORAL at 05:59

## 2024-11-22 RX ADMIN — METOCLOPRAMIDE HYDROCHLORIDE 10 MILLIGRAM(S): 10 TABLET ORAL at 14:54

## 2024-11-22 RX ADMIN — ENOXAPARIN SODIUM 40 MILLIGRAM(S): 30 INJECTION SUBCUTANEOUS at 15:54

## 2024-11-22 NOTE — PROGRESS NOTE ADULT - ASSESSMENT
Patient is a 74y Male with h/o gastric ulcer (s/p partial gastrectomy and Bilroth II reconstruction in 1970s), VASILIY, HTN , s/p appendectomy (1980s), s/p hydrocele (1990s) recently admitted to [facility] 10/28-11/3 for SBO s/p Ex-lap, SHANON with small bowel resection on 10/28  presents with n/v and abd pain. Pt a/w recurrent SBO and Bacteremia. . Nutrition consulted for TPN.   Repeat BCX 11/8 NG  s/p EGD 11/13 with +Nasojejunal enteral tube placement    1. Severe PCM- + wt loss, NPO since 11/6 (pt states he has not eaten even 1 week PTA).  Naso-jejunal tube removed; BCx 11/8- NG. s/p IR PICC placed 11/15. Pt initiated on TPN on 11/16 due to prolonged NPO status.   c/w TPN with lipids @ 2L. Keep off IVF.   FS acceptable. c/w FS q6hrs while on TPN. c/w ISS.  hgbA1C 5.9 on 11/14.   Triglycerides 232 on 11/21; c/w lipids.  LFTs wnl. Check CMP/Mg/Phos/ Ionized calcium in am.     Daily weights. Strict I/O. Hold TPN if pt spikes fever and check BCX x2    2. Hypophosphatemia- resolved with phos in TPN.  Monitor K/Mg/ Phos daily.   3. Bacteremia/ Enteritis- Finished Ceftriaxone/ Flagyl. BCx+ 11/ 6- Bacteroides fragilis. Repeat BCx 11/8 NG. ID following  4. Essential HTN- BP acceptable. Pt on Hydralazine 10mg IV q 6hr prn SBP >160. Monitor BP  5. SBO-Pt had BM today; pending abd xray.  plan as per surgery       TPN orders    Weight 64.4 kg  Total Kcal 1800  Fat 50g = 500 kcal  Protein 78g = 312 kcal  Dextrose 290g = 988 kcal    [facility] NEPHROLOGY  Jose Angel Horner M.D.  Caden Heck D.O.  Amalia Allen M.D.  MD Eugenia Cazares, MSN, ANP-C    Telephone: [phone]  Facsimile: [phone]    [address] [place] [address], [address]  [place] [address]

## 2024-11-22 NOTE — PROGRESS NOTE ADULT - NS ATTEND BILL GEN_ALL_CORE
"Bárbara Brink [place] D/C'd. Discharge instructions including the importance of hydration, the use of OTC medications, information on Strep pharyngitis and the proper follow up recommendations have been provided to the pt/father. Pt/father verbalizes understanding, no further questions or concerns at this time. A copy of the discharge instructions have been provided to pt/father. A signed copy is in the chart. Pt carried out of department by father; pt in NAD, awake, alert, and age appropriate. VS stable, /54   Pulse 107   Temp 36.6 °C (97.8 °F) (Temporal)   Resp 24   Ht 1.092 m (3' 7\")   Wt 16.6 kg (36 lb 9.5 oz)   SpO2 98%   BMI 13.92 kg/m²   Pt's father aware of need to return to ER for concerns or condition changes.    "
"Pt ambulatory to Peds 50. Agree with triage RN note. Instructed to change into gown. Placed on pulse ox. Pt alert, pink, interactive and in NAD. Father is a poor historian and states \"she was with her mom so she knows most of this\". Father denies known fevers, vomiting or loss of appetite. Reports symptoms have not significantly improved since Saturday, but they have not worsened. No cough noted on assessment. Respirations even and unlabored, lungs CTA, pt speaking in full sentences and giggling. Father reports they have been taking steroids and antibiotics as prescribed and has been using albuterol 2-3 times per day. Reports headache improves with motrin. Displays age appropriate interaction with family and staff. Family at bedside. Call light within reach. Denies additional needs. Up for ERP eval.    "
D/C follow-up call via [phone] , message left with call back number.      
ERP to bedside  
Introduced child life services. Coloring pages provided for play.  
Report received from Erika NDIAYE.   
Attending to bill

## 2024-11-22 NOTE — PROGRESS NOTE ADULT - ASSESSMENT
74M  s/p exlap lysis of adhesions, small bowel resection readmitted with recurrent SBO.  s/p EGD on 11/13; stricture was found which was dilated, feeding tube passed through that portion and subsequently removed 11/16   NGT replaced    L PICC placed 11/15    Plan  -Multimodal pain control PRN  -NGT to LIS, plan for daily clamp trials, possible NGT removal  -Monitor NGT output   -Continue TPN, per nephro  -TPN via left PICC  -Replete electrolytes PRN  -Daily OOBTC and continue ambulation   -Continue Lovenox for dvt ppx  -Monitor return bowel function  -Completed course of IV ABx per ID

## 2024-11-22 NOTE — PROGRESS NOTE ADULT - NS ATTEND AMEND GEN_ALL_CORE FT
I agree with above

## 2024-11-22 NOTE — PROGRESS NOTE ADULT - SUBJECTIVE AND OBJECTIVE BOX
Patient seen and examined at bedside. No acute overnight events. Clamp trial completed overnight, patient tolerated without worsening in nausea. 150cc output overnight when unclamped. Denies nausea or vomiting overnight. Denies abdominal pain. Endorses two episodes of flatus this morning. Remains on TPN. Ambulating well. Pending laboratory studies today. Hemodynamically stable and afebrile.     Vital Signs Last 24 Hrs  T(C): 37 (22 Nov 2024 05:49), Max: 37.2 (21 Nov 2024 22:48)  T(F): 98.6 (22 Nov 2024 05:49), Max: 98.9 (21 Nov 2024 22:48)  HR: 66 (22 Nov 2024 05:49) (66 - 98)  BP: 126/77 (22 Nov 2024 05:49) (110/70 - 126/77)  BP(mean): --  RR: 18 (22 Nov 2024 05:49) (18 - 18)  SpO2: 98% (22 Nov 2024 05:49) (93% - 98%)    Parameters below as of 22 Nov 2024 05:49  Patient On (Oxygen Delivery Method): room air    I&O's Detail    21 Nov 2024 07:01  -  22 Nov 2024 07:00  --------------------------------------------------------  IN:  Total IN: 0 mL    OUT:    Nasogastric/Oral tube (mL): 150 mL    Voided (mL): 1650 mL  Total OUT: 1800 mL    Total NET: -1800 mL      PE  General: well appearing, no acute distress, NGT in place to LIS, bilious content visualized in cannister approximately 150cc  Resp: Normal respiratory effort  CV: regular rate and rhythm  Abd: soft, nontender, nondistended, midline incision with Steri-strips in place, well appearing no strikethrough  : voiding spontaneously  MSK: FROM in bilateral extremities  Neuro: GCS15, sensation intact bilaterally                            13.1   6.52  )-----------( 278      ( 22 Nov 2024 06:53 )             39.3     Pending chem panel

## 2024-11-22 NOTE — PROGRESS NOTE ADULT - ASSESSMENT
Bacteremia - with Bacteroides  SBO    Plan -   ·	will monitor off antibiotics for now  ·	will monitor to make sure no fevers on TPN develop. Bacteremia - with Bacteroides  SBO    Plan -   ·	will monitor off antibiotics for now  ·	will monitor to make sure no fevers on TPN develop.  ·	re-consult prn  Bacteremia - with Bacteroides  SBO    Plan -   ·	completed treatment and has no fevers off abxs  ·	re-consult prn

## 2024-11-22 NOTE — CHART NOTE - NSCHARTNOTEFT_GEN_A_CORE
Pt seen for severe follow up    Spoke to pt at bedside. No recent episodes of nausea, vomiting, diarrhea or constipation per pt. Last BM noted on 11/21 per pt. Pt receiving 2L TPN solution, currently on goal rate of 83.3 ml/hr. POCT 144 - 192 between 11/20-11/22. Triglycerides - 232. RD to remain available.    Diet Prescription: Diet, NPO (11-22-24 @ 10:30)    Pertinent Medications: MEDICATIONS  (STANDING):  benzocaine/menthol Lozenge 1 Lozenge Oral once  chlorhexidine 2% Cloths 1 Application(s) Topical daily  dextrose 5%. 1000 milliLiter(s) (50 mL/Hr) IV Continuous <Continuous>  dextrose 5%. 1000 milliLiter(s) (100 mL/Hr) IV Continuous <Continuous>  dextrose 50% Injectable 25 Gram(s) IV Push once  dextrose 50% Injectable 12.5 Gram(s) IV Push once  dextrose 50% Injectable 25 Gram(s) IV Push once  diatrizoate meglumine/diatrizoate sodium. 90 milliLiter(s) Oral once  enoxaparin Injectable 40 milliGRAM(s) SubCutaneous every 24 hours  glucagon  Injectable 1 milliGRAM(s) IntraMuscular once  insulin lispro (ADMELOG) corrective regimen sliding scale   SubCutaneous every 6 hours  lipid, fat emulsion (Fish Oil and Plant Based) 20% Infusion 0.7764 Gm/kG/Day (10.4 mL/Hr) IV Continuous <Continuous>  lipid, fat emulsion (Fish Oil and Plant Based) 20% Infusion 0.7764 Gm/kG/Day (10.4 mL/Hr) IV Continuous <Continuous>  metoclopramide Injectable 10 milliGRAM(s) IV Push every 8 hours  pantoprazole  Injectable 40 milliGRAM(s) IV Push every 24 hours  Parenteral Nutrition - Adult 1 Each (83 mL/Hr) TPN Continuous <Continuous>  Parenteral Nutrition - Adult 1 Each (83 mL/Hr) TPN Continuous <Continuous>    MEDICATIONS  (PRN):  dextrose Oral Gel 15 Gram(s) Oral once PRN Blood Glucose LESS THAN 70 milliGRAM(s)/deciliter  hydrALAZINE Injectable 10 milliGRAM(s) IV Push every 6 hours PRN systolic BP > 160  ondansetron Injectable 4 milliGRAM(s) IV Push every 6 hours PRN Nausea  sodium chloride 0.9% lock flush 10 milliLiter(s) IV Push every 1 hour PRN Pre/post blood products, medications, blood draw, and to maintain line patency    Pertinent Labs: 11-22 Na140 mmol/L Glu 194 mg/dL[H] K+ 4.2 mmol/L Cr  0.79 mg/dL BUN 14 mg/dL 11-22 Phos 3.3 mg/dL 11-22 Alb 2.8 g/dL[L] 11-21 Chol --    LDL --    HDL --    Trig 232 mg/dL[H]     CAPILLARY BLOOD GLUCOSE      POCT Blood Glucose.: 163 mg/dL (22 Nov 2024 05:24)  POCT Blood Glucose.: 154 mg/dL (21 Nov 2024 23:16)  POCT Blood Glucose.: 175 mg/dL (21 Nov 2024 17:05)  POCT Blood Glucose.: 168 mg/dL (21 Nov 2024 11:49)      Weight: 142 lbs  Height: 66 in   IBW: 142 lbs +/-10%  BMI: 22.9 kg/m^2    Physical Assessment, per flowsheets:  Edema: No edema noted per RN flowsheets   Pressure Injury: No pressure injuries noted per RN flowsheets     Estimated Needs:   [X] No change since previous assessment    Previous Nutrition Diagnosis: [X] Malnutrition   Nutrition Diagnosis is [X] ongoing   New Nutrition Diagnosis: [X] not applicable     Education:  [X] Provided on previous assessment by RD    Interventions:   1) Continue current TPN at goal rate as medically feasible.  2) Monitor TPN rate, weights, BM's, I/O's, lipid labs, POCT, ALT/AST, bilirubin and skin integrity.    Rolando Mitchell RD (Available on teams).

## 2024-11-22 NOTE — PROGRESS NOTE ADULT - SUBJECTIVE AND OBJECTIVE BOX
[facility] NEPHROLOGY- PROGRESS NOTE    Patient is a 74y Male with h/o gastric ulcer (s/p partial gastrectomy and Bilroth II reconstruction in 1970s), VASILIY, HTN , s/p appendectomy (1980s), s/p hydrocele (1990s) recently admitted to [facility] 10/28-11/3 for SBO s/p Ex-lap, SHANON with small bowel resection on 10/28  presents with n/v and abd pain. Pt a/w recurrent SBO and Bacteremia. . Nutrition consulted for TPN.   Repeat BCX 11/8 NG  s/p EGD 11/13 with +Nasojejunal enteral tube placement  However NJ tube kinked and removed on 11/15.   s/p IR LUE PICC placed on 11/15    Hospital Medications: Medications reviewed.  REVIEW OF SYSTEMS:  CONSTITUTIONAL: No fevers or chills +sore throat with clear phelgm  RESPIRATORY: No shortness of breath  CARDIOVASCULAR: No chest pain.  GASTROINTESTINAL: +BM today. No nausea, vomiting, or abdominal pain.    VASCULAR: No bilateral lower extremity edema.        VITALS:  T(F): 98 (11-22-24 @ 13:06), Max: 98.9 (11-21-24 @ 22:48)  HR: 71 (11-22-24 @ 13:06)  BP: 124/77 (11-22-24 @ 13:06)  RR: 18 (11-22-24 @ 13:06)  SpO2: 97% (11-22-24 @ 13:06)  Wt(kg): --    11-21 @ 07:01  -  11-22 @ 07:00  --------------------------------------------------------  IN: 0 mL / OUT: 1800 mL / NET: -1800 mL      PHYSICAL EXAM:  Constitutional: NAD,  HEENT: anicteric sclera, +NGT clamped  Neck: No JVD  Respiratory: CTA b/l  Cardiovascular: S1, S2, RRR  Gastrointestinal: soft, midline staples   +BS  Extremities:  No peripheral edema  Access: Left PICC line- intact    LABS:  11-22    140  |  108  |  14  ----------------------------<  194[H]  4.2   |  28  |  0.79    Ca    8.7      22 Nov 2024 06:53  Phos  3.3     11-22  Mg     2.0     11-22    TPro  6.6  /  Alb  2.8[L]  /  TBili  0.4  /  DBili      /  AST  35  /  ALT  37  /  AlkPhos  125[H]  11-22    Creatinine Trend: 0.79 <--, 0.82 <--, 0.84 <--, 0.80 <--, 0.84 <--, 0.88 <--, 0.79 <--                        13.1   6.52  )-----------( 278      ( 22 Nov 2024 06:53 )             39.3     Urine Studies:  Urinalysis Basic - ( 22 Nov 2024 06:53 )    Color:  / Appearance:  / SG:  / pH:   Gluc: 194 mg/dL / Ketone:   / Bili:  / Urobili:    Blood:  / Protein:  / Nitrite:    Leuk Esterase:  / RBC:  / WBC    Sq Epi:  / Non Sq Epi:  / Bacteria:

## 2024-11-22 NOTE — PROGRESS NOTE ADULT - SUBJECTIVE AND OBJECTIVE BOX
74y Male is under our care for     MEDS:    ALLERGIES: Allergies    No Known Allergies    Intolerances    REVIEW OF SYSTEMS:  [  ] Not able to elicit  General:	  Chest:	  GI:	  :  Skin:	  Musculoskeletal:	  Neuro:	    VITALS:  Vital Signs Last 24 Hrs  T(C): 37 (22 Nov 2024 05:49), Max: 37.2 (21 Nov 2024 22:48)  T(F): 98.6 (22 Nov 2024 05:49), Max: 98.9 (21 Nov 2024 22:48)  HR: 66 (22 Nov 2024 05:49) (66 - 98)  BP: 126/77 (22 Nov 2024 05:49) (110/70 - 126/77)  BP(mean): --  RR: 18 (22 Nov 2024 05:49) (18 - 18)  SpO2: 98% (22 Nov 2024 05:49) (93% - 98%)    Parameters below as of 22 Nov 2024 05:49  Patient On (Oxygen Delivery Method): room air    PHYSICAL EXAM:  HEENT:  Neck:  Respiratory:  Cardiovascular:  Gastrointestinal:  :  Extremities:  Skin:  Ortho:  Neuro:    LABS/DIAGNOSTIC TESTS:                        13.1   6.52  )-----------( 278      ( 22 Nov 2024 06:53 )             39.3     WBC Count: 6.52 K/uL (11-22 @ 06:53)  WBC Count: 7.37 K/uL (11-21 @ 06:50)  WBC Count: 7.60 K/uL (11-20 @ 06:08)  WBC Count: 6.80 K/uL (11-19 @ 06:50)  WBC Count: 5.62 K/uL (11-18 @ 07:11)    11-22    140  |  108  |  14  ----------------------------<  194[H]  4.2   |  28  |  0.79    Ca    8.7      22 Nov 2024 06:53  Phos  3.3     11-22  Mg     2.0     11-22    TPro  6.6  /  Alb  2.8[L]  /  TBili  0.4  /  DBili  x   /  AST  35  /  ALT  37  /  AlkPhos  125[H]  11-22    CULTURES:   .Blood BLOOD  11-08 @ 10:30   No growth at 5 days  --  --    .Blood BLOOD  11-06 @ 11:30   Growth in anaerobic bottle: Bacteroides fragilis "Susceptibilities not  performed"  Direct identification is available within approximately 3-5  hours either by Blood Panel Multiplexed PCR or Direct  MALDI-TOF. Details: [email or web address]  --  Blood Culture PCR    .Blood BLOOD  11-06 @ 11:20   Growth in anaerobic bottle: Bacteroides fragilis  "Susceptibilities not performed"  --    Growth in anaerobic bottle: Gram Negative Rods    Clean Catch Clean Catch (Midstream)  10-28 @ 12:30   <10,000 CFU/mL Normal Urogenital Merlene  --  --    RADIOLOGY:  no new studies 74y Male sitting up in the chair and in no acute distress. No overnight events. NGT in place. He had a normal BM today. Denies nausea or vomiting. No fevers or chills.     MEDS:    ALLERGIES: Allergies    No Known Allergies    Intolerances    REVIEW OF SYSTEMS:  [  ] Not able to elicit  General: no fevers no malaise  Chest: no cough no sob  GI: no nvd  : no urinary sxs   Skin: no rashes  Musculoskeletal: no trauma no LBP  Neuro: no ha's no dizziness     VITALS:  Vital Signs Last 24 Hrs  T(C): 37 (22 Nov 2024 05:49), Max: 37.2 (21 Nov 2024 22:48)  T(F): 98.6 (22 Nov 2024 05:49), Max: 98.9 (21 Nov 2024 22:48)  HR: 66 (22 Nov 2024 05:49) (66 - 98)  BP: 126/77 (22 Nov 2024 05:49) (110/70 - 126/77)  BP(mean): --  RR: 18 (22 Nov 2024 05:49) (18 - 18)  SpO2: 98% (22 Nov 2024 05:49) (93% - 98%)    Parameters below as of 22 Nov 2024 05:49  Patient On (Oxygen Delivery Method): room air    PHYSICAL EXAM:  HEENT: normocephalic, conjunctivae and sclerae clear; moist mucous membranes; NGT  Neck: supple no LN's   Respiratory: lungs clear no rales  Cardiovascular: S1 S2 reg no murmurs  Gastrointestinal: +BS with soft, nondistended abdomen; nontender; midline Steri-strips in place   Extremities: no edema  Skin: no rashes  Ortho: no erythema or joint swelling  Neuro: AAO x 3    LABS/DIAGNOSTIC TESTS:                        13.1   6.52  )-----------( 278      ( 22 Nov 2024 06:53 )             39.3     WBC Count: 6.52 K/uL (11-22 @ 06:53)  WBC Count: 7.37 K/uL (11-21 @ 06:50)  WBC Count: 7.60 K/uL (11-20 @ 06:08)  WBC Count: 6.80 K/uL (11-19 @ 06:50)  WBC Count: 5.62 K/uL (11-18 @ 07:11)    11-22    140  |  108  |  14  ----------------------------<  194[H]  4.2   |  28  |  0.79    Ca    8.7      22 Nov 2024 06:53  Phos  3.3     11-22  Mg     2.0     11-22    TPro  6.6  /  Alb  2.8[L]  /  TBili  0.4  /  DBili  x   /  AST  35  /  ALT  37  /  AlkPhos  125[H]  11-22    CULTURES:   .Blood BLOOD  11-08 @ 10:30   No growth at 5 days  --  --    .Blood BLOOD  11-06 @ 11:30   Growth in anaerobic bottle: Bacteroides fragilis "Susceptibilities not  performed"  Direct identification is available within approximately 3-5  hours either by Blood Panel Multiplexed PCR or Direct  MALDI-TOF. Details: [email or web address]  --  Blood Culture PCR    .Blood BLOOD  11-06 @ 11:20   Growth in anaerobic bottle: Bacteroides fragilis  "Susceptibilities not performed"  --    Growth in anaerobic bottle: Gram Negative Rods    Clean Catch Clean Catch (Midstream)  10-28 @ 12:30   <10,000 CFU/mL Normal Urogenital Merlene  --  --    RADIOLOGY:  no new studies

## 2024-11-23 LAB
ALBUMIN SERPL ELPH-MCNC: 2.7 G/DL — LOW (ref 3.5–5)
ALP SERPL-CCNC: 150 U/L — HIGH (ref 40–120)
ALT FLD-CCNC: 53 U/L DA — SIGNIFICANT CHANGE UP (ref 10–60)
ANION GAP SERPL CALC-SCNC: 6 MMOL/L — SIGNIFICANT CHANGE UP (ref 5–17)
AST SERPL-CCNC: 52 U/L — HIGH (ref 10–40)
BASOPHILS # BLD AUTO: 0.06 K/UL — SIGNIFICANT CHANGE UP (ref 0–0.2)
BASOPHILS NFR BLD AUTO: 1 % — SIGNIFICANT CHANGE UP (ref 0–2)
BILIRUB SERPL-MCNC: 0.4 MG/DL — SIGNIFICANT CHANGE UP (ref 0.2–1.2)
BUN SERPL-MCNC: 18 MG/DL — SIGNIFICANT CHANGE UP (ref 7–18)
CA-I BLD-SCNC: 4.9 MG/DL — SIGNIFICANT CHANGE UP (ref 4.5–5.6)
CALCIUM SERPL-MCNC: 8.4 MG/DL — SIGNIFICANT CHANGE UP (ref 8.4–10.5)
CHLORIDE SERPL-SCNC: 108 MMOL/L — SIGNIFICANT CHANGE UP (ref 96–108)
CO2 SERPL-SCNC: 26 MMOL/L — SIGNIFICANT CHANGE UP (ref 22–31)
CREAT SERPL-MCNC: 0.8 MG/DL — SIGNIFICANT CHANGE UP (ref 0.5–1.3)
EGFR: 93 ML/MIN/1.73M2 — SIGNIFICANT CHANGE UP
EOSINOPHIL # BLD AUTO: 0.22 K/UL — SIGNIFICANT CHANGE UP (ref 0–0.5)
EOSINOPHIL NFR BLD AUTO: 3.8 % — SIGNIFICANT CHANGE UP (ref 0–6)
GLUCOSE BLDC GLUCOMTR-MCNC: 129 MG/DL — HIGH (ref 70–99)
GLUCOSE BLDC GLUCOMTR-MCNC: 168 MG/DL — HIGH (ref 70–99)
GLUCOSE BLDC GLUCOMTR-MCNC: 179 MG/DL — HIGH (ref 70–99)
GLUCOSE BLDC GLUCOMTR-MCNC: 191 MG/DL — HIGH (ref 70–99)
GLUCOSE SERPL-MCNC: 185 MG/DL — HIGH (ref 70–99)
HCT VFR BLD CALC: 38.3 % — LOW (ref 39–50)
HGB BLD-MCNC: 12.9 G/DL — LOW (ref 13–17)
IMM GRANULOCYTES NFR BLD AUTO: 4.5 % — HIGH (ref 0–0.9)
LYMPHOCYTES # BLD AUTO: 1.15 K/UL — SIGNIFICANT CHANGE UP (ref 1–3.3)
LYMPHOCYTES # BLD AUTO: 19.8 % — SIGNIFICANT CHANGE UP (ref 13–44)
MAGNESIUM SERPL-MCNC: 1.9 MG/DL — SIGNIFICANT CHANGE UP (ref 1.6–2.6)
MCHC RBC-ENTMCNC: 28.4 PG — SIGNIFICANT CHANGE UP (ref 27–34)
MCHC RBC-ENTMCNC: 33.7 G/DL — SIGNIFICANT CHANGE UP (ref 32–36)
MCV RBC AUTO: 84.4 FL — SIGNIFICANT CHANGE UP (ref 80–100)
MONOCYTES # BLD AUTO: 0.73 K/UL — SIGNIFICANT CHANGE UP (ref 0–0.9)
MONOCYTES NFR BLD AUTO: 12.6 % — SIGNIFICANT CHANGE UP (ref 2–14)
NEUTROPHILS # BLD AUTO: 3.39 K/UL — SIGNIFICANT CHANGE UP (ref 1.8–7.4)
NEUTROPHILS NFR BLD AUTO: 58.3 % — SIGNIFICANT CHANGE UP (ref 43–77)
NRBC # BLD: 0 /100 WBCS — SIGNIFICANT CHANGE UP (ref 0–0)
PHOSPHATE SERPL-MCNC: 3.8 MG/DL — SIGNIFICANT CHANGE UP (ref 2.5–4.5)
PLATELET # BLD AUTO: 250 K/UL — SIGNIFICANT CHANGE UP (ref 150–400)
POTASSIUM SERPL-MCNC: 4 MMOL/L — SIGNIFICANT CHANGE UP (ref 3.5–5.3)
POTASSIUM SERPL-SCNC: 4 MMOL/L — SIGNIFICANT CHANGE UP (ref 3.5–5.3)
PROT SERPL-MCNC: 6.5 G/DL — SIGNIFICANT CHANGE UP (ref 6–8.3)
RBC # BLD: 4.54 M/UL — SIGNIFICANT CHANGE UP (ref 4.2–5.8)
RBC # FLD: 15.8 % — HIGH (ref 10.3–14.5)
SODIUM SERPL-SCNC: 140 MMOL/L — SIGNIFICANT CHANGE UP (ref 135–145)
TRIGL SERPL-MCNC: 236 MG/DL — HIGH
WBC # BLD: 5.81 K/UL — SIGNIFICANT CHANGE UP (ref 3.8–10.5)
WBC # FLD AUTO: 5.81 K/UL — SIGNIFICANT CHANGE UP (ref 3.8–10.5)

## 2024-11-23 RX ORDER — SODIUM/POT/MAG/CALC/CHLOR/ACET 35-20-5MEQ
1 VIAL (ML) INTRAVENOUS
Refills: 0 | Status: DISCONTINUED | OUTPATIENT
Start: 2024-11-23 | End: 2024-11-23

## 2024-11-23 RX ORDER — FAT EMULSIONS 20 %
0.78 EMULSION INTRAVENOUS
Qty: 50 | Refills: 0 | Status: DISCONTINUED | OUTPATIENT
Start: 2024-11-23 | End: 2024-11-23

## 2024-11-23 RX ADMIN — METOCLOPRAMIDE HYDROCHLORIDE 10 MILLIGRAM(S): 10 TABLET ORAL at 14:01

## 2024-11-23 RX ADMIN — Medication 1 EACH: at 23:20

## 2024-11-23 RX ADMIN — Medication 1: at 18:25

## 2024-11-23 RX ADMIN — CHLORHEXIDINE GLUCONATE 1 APPLICATION(S): 1.2 RINSE ORAL at 11:00

## 2024-11-23 RX ADMIN — Medication 1: at 05:51

## 2024-11-23 RX ADMIN — PANTOPRAZOLE SODIUM 40 MILLIGRAM(S): 40 TABLET, DELAYED RELEASE ORAL at 10:59

## 2024-11-23 RX ADMIN — Medication 1: at 11:18

## 2024-11-23 RX ADMIN — Medication 10.4 GM/KG/DAY: at 23:20

## 2024-11-23 RX ADMIN — METOCLOPRAMIDE HYDROCHLORIDE 10 MILLIGRAM(S): 10 TABLET ORAL at 21:09

## 2024-11-23 RX ADMIN — ENOXAPARIN SODIUM 40 MILLIGRAM(S): 30 INJECTION SUBCUTANEOUS at 16:06

## 2024-11-23 NOTE — PROGRESS NOTE ADULT - SUBJECTIVE AND OBJECTIVE BOX
INTERVAL HPI/OVERNIGHT EVENTS: AVSS. NGT removed yesterday. Patient seen and examined at bedside. No acute complaints. Pain well controlled. Denies N/V, fevers, chills. +flatus and BM. OOB and ambulating       Vital Signs Last 24 Hrs  T(C): 36.2 (23 Nov 2024 05:39), Max: 36.7 (22 Nov 2024 13:06)  T(F): 97.2 (23 Nov 2024 05:39), Max: 98 (22 Nov 2024 13:06)  HR: 68 (23 Nov 2024 05:39) (68 - 74)  BP: 122/74 (23 Nov 2024 05:39) (101/67 - 124/77)  BP(mean): 79 (22 Nov 2024 20:50) (79 - 79)  RR: 18 (23 Nov 2024 05:39) (18 - 18)  SpO2: 97% (23 Nov 2024 05:39) (97% - 97%)    Parameters below as of 23 Nov 2024 05:39  Patient On (Oxygen Delivery Method): room air      I&O's Detail    22 Nov 2024 07:01  -  23 Nov 2024 07:00  --------------------------------------------------------  IN:  Total IN: 0 mL    OUT:    Nasogastric/Oral tube (mL): 0 mL    Voided (mL): 1500 mL  Total OUT: 1500 mL    Total NET: -1500 mL        diatrizoate meglumine/diatrizoate sodium. 90 milliLiter(s) Oral once  pantoprazole  Injectable 40 milliGRAM(s) IV Push every 24 hours      Physical Exam:  General: AAOx3, No acute distress  HEENT: NC/AT, trachea midline  Respiratory: Nonlabored breathing, equal chest rise b/l   Skin: No jaundice, no icterus  Abdomen: soft,  nondistended, nontender. midline incision with Steri-strips in place.   Extremities: non edematous, no calf pain bilaterally  Lines/Drains/Tubes:     Drains/Tubes:     11-22-24 @ 07:01  -  11-23-24 @ 07:00  --------------------------------------------------------  IN: 0 mL / OUT: 1500 mL / NET: -1500 mL        Labs:                        12.9   5.81  )-----------( 250      ( 23 Nov 2024 06:00 )             38.3     11-23    140  |  108  |  18  ----------------------------<  185[H]  4.0   |  26  |  0.80    Ca    8.4      23 Nov 2024 06:00  Phos  3.8     11-23  Mg     1.9     11-23    TPro  6.5  /  Alb  2.7[L]  /  TBili  0.4  /  DBili  x   /  AST  52[H]  /  ALT  53  /  AlkPhos  150[H]  11-23        RADIOLOGY & ADDITIONAL STUDIES:

## 2024-11-23 NOTE — PROGRESS NOTE ADULT - ASSESSMENT
74M  s/p exlap lysis of adhesions, small bowel resection readmitted with recurrent SBO.  s/p EGD on 11/13; stricture was found which was dilated, feeding tube passed through that portion and subsequently removed 11/16   NGT replaced    L PICC placed 11/15    Plan  -Multimodal pain control PRN  - Sips of clears  -Continue TPN, per nephro  -TPN via left PICC  -Replete electrolytes PRN  -Daily OOBTC and continue ambulation   -Continue Lovenox for dvt ppx  -Monitor return bowel function  -Completed course of IV ABx per ID

## 2024-11-23 NOTE — PROGRESS NOTE ADULT - ASSESSMENT
Patient is a 74y Male with h/o gastric ulcer (s/p partial gastrectomy and Bilroth II reconstruction in 1970s), VASILIY, HTN , s/p appendectomy (1980s), s/p hydrocele (1990s) recently admitted to [facility] 10/28-11/3 for SBO s/p Ex-lap, SHANON with small bowel resection on 10/28  presents with n/v and abd pain. Pt a/w recurrent SBO and Bacteremia. . Nutrition consulted for TPN.   Repeat BCX 11/8 NG  s/p EGD 11/13 with +Nasojejunal enteral tube placement    1. Severe PCM- + wt loss, NPO since 11/6 (pt states he has not eaten even 1 week PTA).  Naso-jejunal tube removed; BCx 11/8- NG. s/p IR PICC placed 11/15. Pt initiated on TPN on 11/16 due to prolonged NPO status.   c/w TPN with lipids @ 2L. Keep off IVF.   FS acceptable. c/w FS q6hrs while on TPN. c/w ISS.  hgbA1C 5.9 on 11/14.   Triglycerides 232 on 11/21; c/w lipids.  LFTs wnl. Check CMP/Mg/Phos/ Ionized calcium in am.     Daily weights. Strict I/O. Hold TPN if pt spikes fever and check BCX x2    2. Hypophosphatemia- resolved with phos in TPN.  Monitor K/Mg/ Phos daily.   3. Bacteremia/ Enteritis- Finished Ceftriaxone/ Flagyl. BCx+ 11/ 6- Bacteroides fragilis. Repeat BCx 11/8 NG. ID following  4. Essential HTN- BP acceptable. Pt on Hydralazine 10mg IV q 6hr prn SBP >160. Monitor BP  5. SBO-Pt had BM today; pending abd xray.  plan as per surgery       TPN orders    Weight 64.4 kg  Total Kcal 1800  Fat 50g = 500 kcal  Protein 78g = 312 kcal  Dextrose 290g = 988 kcal    [facility] NEPHROLOGY  Jose Angel Horner M.D.  Caden Heck D.O.  Amalia Allen M.D.  MD Eugenia Cazares, MSN, ANP-C    Telephone: [phone]  Facsimile: [phone]    [address] [place] [address], [address]  [place] [address]

## 2024-11-23 NOTE — PROGRESS NOTE ADULT - SUBJECTIVE AND OBJECTIVE BOX
[facility] NEPHROLOGY- PROGRESS NOTE    Patient is a 74y Male with h/o gastric ulcer (s/p partial gastrectomy and Bilroth II reconstruction in 1970s), VASILIY, HTN , s/p appendectomy (1980s), s/p hydrocele (1990s) recently admitted to [facility] 10/28-11/3 for SBO s/p Ex-lap, SHANON with small bowel resection on 10/28  presents with n/v and abd pain. Pt a/w recurrent SBO and Bacteremia. . Nutrition consulted for TPN.   Repeat BCX 11/8 NG  s/p EGD 11/13 with +Nasojejunal enteral tube placement  However NJ tube kinked and removed on 11/15.   s/p IR LUE PICC placed on 11/15    Hospital Medications: Medications reviewed.  REVIEW OF SYSTEMS:  CONSTITUTIONAL: No fevers or chills +sore throat with clear phelgm  RESPIRATORY: No shortness of breath  CARDIOVASCULAR: No chest pain.  GASTROINTESTINAL: +BM today. No nausea, vomiting, or abdominal pain.    VASCULAR: No bilateral lower extremity edema.          VITALS:  T(F): 98 (11-23-24 @ 12:35), Max: 98 (11-23-24 @ 12:35)  HR: 75 (11-23-24 @ 12:35)  BP: 107/63 (11-23-24 @ 12:35)  RR: 18 (11-23-24 @ 12:35)  SpO2: 98% (11-23-24 @ 12:35)  Wt(kg): --    11-22 @ 07:01  -  11-23 @ 07:00  --------------------------------------------------------  IN: 0 mL / OUT: 1500 mL / NET: -1500 mL      PHYSICAL EXAM:  Constitutional: NAD,  Respiratory: CTA b/l  Cardiovascular: S1, S2, RRR  Gastrointestinal: soft, midline staples   +BS  Extremities:  No peripheral edema  Access: Left PICC line- intact        LABS:  11-23    140  |  108  |  18  ----------------------------<  185[H]  4.0   |  26  |  0.80    Ca    8.4      23 Nov 2024 06:00  Phos  3.8     11-23  Mg     1.9     11-23    TPro  6.5  /  Alb  2.7[L]  /  TBili  0.4  /  DBili      /  AST  52[H]  /  ALT  53  /  AlkPhos  150[H]  11-23    Creatinine Trend: 0.80 <--, 0.79 <--, 0.82 <--, 0.84 <--, 0.80 <--, 0.84 <--, 0.88 <--                        12.9   5.81  )-----------( 250      ( 23 Nov 2024 06:00 )             38.3     Urine Studies:  Urinalysis Basic - ( 23 Nov 2024 06:00 )    Color:  / Appearance:  / SG:  / pH:   Gluc: 185 mg/dL / Ketone:   / Bili:  / Urobili:    Blood:  / Protein:  / Nitrite:    Leuk Esterase:  / RBC:  / WBC    Sq Epi:  / Non Sq Epi:  / Bacteria:

## 2024-11-24 LAB
ALBUMIN SERPL ELPH-MCNC: 2.6 G/DL — LOW (ref 3.5–5)
ALP SERPL-CCNC: 147 U/L — HIGH (ref 40–120)
ALT FLD-CCNC: 52 U/L DA — SIGNIFICANT CHANGE UP (ref 10–60)
ANION GAP SERPL CALC-SCNC: 3 MMOL/L — LOW (ref 5–17)
AST SERPL-CCNC: 44 U/L — HIGH (ref 10–40)
BASOPHILS # BLD AUTO: 0.04 K/UL — SIGNIFICANT CHANGE UP (ref 0–0.2)
BASOPHILS NFR BLD AUTO: 0.9 % — SIGNIFICANT CHANGE UP (ref 0–2)
BILIRUB DIRECT SERPL-MCNC: 0.1 MG/DL — SIGNIFICANT CHANGE UP (ref 0–0.3)
BILIRUB SERPL-MCNC: 0.4 MG/DL — SIGNIFICANT CHANGE UP (ref 0.2–1.2)
BUN SERPL-MCNC: 16 MG/DL — SIGNIFICANT CHANGE UP (ref 7–18)
CALCIUM SERPL-MCNC: 8.4 MG/DL — SIGNIFICANT CHANGE UP (ref 8.4–10.5)
CHLORIDE SERPL-SCNC: 111 MMOL/L — HIGH (ref 96–108)
CO2 SERPL-SCNC: 26 MMOL/L — SIGNIFICANT CHANGE UP (ref 22–31)
CREAT SERPL-MCNC: 0.75 MG/DL — SIGNIFICANT CHANGE UP (ref 0.5–1.3)
EGFR: 95 ML/MIN/1.73M2 — SIGNIFICANT CHANGE UP
EOSINOPHIL # BLD AUTO: 0.21 K/UL — SIGNIFICANT CHANGE UP (ref 0–0.5)
EOSINOPHIL NFR BLD AUTO: 4.6 % — SIGNIFICANT CHANGE UP (ref 0–6)
GLUCOSE BLDC GLUCOMTR-MCNC: 158 MG/DL — HIGH (ref 70–99)
GLUCOSE BLDC GLUCOMTR-MCNC: 165 MG/DL — HIGH (ref 70–99)
GLUCOSE BLDC GLUCOMTR-MCNC: 176 MG/DL — HIGH (ref 70–99)
GLUCOSE SERPL-MCNC: 156 MG/DL — HIGH (ref 70–99)
HCT VFR BLD CALC: 35.9 % — LOW (ref 39–50)
HGB BLD-MCNC: 12 G/DL — LOW (ref 13–17)
IMM GRANULOCYTES NFR BLD AUTO: 4.6 % — HIGH (ref 0–0.9)
LYMPHOCYTES # BLD AUTO: 0.88 K/UL — LOW (ref 1–3.3)
LYMPHOCYTES # BLD AUTO: 19.2 % — SIGNIFICANT CHANGE UP (ref 13–44)
MAGNESIUM SERPL-MCNC: 1.9 MG/DL — SIGNIFICANT CHANGE UP (ref 1.6–2.6)
MCHC RBC-ENTMCNC: 28 PG — SIGNIFICANT CHANGE UP (ref 27–34)
MCHC RBC-ENTMCNC: 33.4 G/DL — SIGNIFICANT CHANGE UP (ref 32–36)
MCV RBC AUTO: 83.9 FL — SIGNIFICANT CHANGE UP (ref 80–100)
MONOCYTES # BLD AUTO: 0.6 K/UL — SIGNIFICANT CHANGE UP (ref 0–0.9)
MONOCYTES NFR BLD AUTO: 13.1 % — SIGNIFICANT CHANGE UP (ref 2–14)
NEUTROPHILS # BLD AUTO: 2.64 K/UL — SIGNIFICANT CHANGE UP (ref 1.8–7.4)
NEUTROPHILS NFR BLD AUTO: 57.6 % — SIGNIFICANT CHANGE UP (ref 43–77)
NRBC # BLD: 0 /100 WBCS — SIGNIFICANT CHANGE UP (ref 0–0)
PHOSPHATE SERPL-MCNC: 3.7 MG/DL — SIGNIFICANT CHANGE UP (ref 2.5–4.5)
PLATELET # BLD AUTO: 247 K/UL — SIGNIFICANT CHANGE UP (ref 150–400)
POTASSIUM SERPL-MCNC: 3.9 MMOL/L — SIGNIFICANT CHANGE UP (ref 3.5–5.3)
POTASSIUM SERPL-SCNC: 3.9 MMOL/L — SIGNIFICANT CHANGE UP (ref 3.5–5.3)
PROT SERPL-MCNC: 6.3 G/DL — SIGNIFICANT CHANGE UP (ref 6–8.3)
RBC # BLD: 4.28 M/UL — SIGNIFICANT CHANGE UP (ref 4.2–5.8)
RBC # FLD: 15.9 % — HIGH (ref 10.3–14.5)
SODIUM SERPL-SCNC: 140 MMOL/L — SIGNIFICANT CHANGE UP (ref 135–145)
TRIGL SERPL-MCNC: 263 MG/DL — HIGH
WBC # BLD: 4.58 K/UL — SIGNIFICANT CHANGE UP (ref 3.8–10.5)
WBC # FLD AUTO: 4.58 K/UL — SIGNIFICANT CHANGE UP (ref 3.8–10.5)

## 2024-11-24 RX ORDER — FAT EMULSIONS 20 %
0.78 EMULSION INTRAVENOUS
Qty: 50 | Refills: 0 | Status: DISCONTINUED | OUTPATIENT
Start: 2024-11-24 | End: 2024-11-24

## 2024-11-24 RX ORDER — SODIUM/POT/MAG/CALC/CHLOR/ACET 35-20-5MEQ
1 VIAL (ML) INTRAVENOUS
Refills: 0 | Status: DISCONTINUED | OUTPATIENT
Start: 2024-11-24 | End: 2024-11-25

## 2024-11-24 RX ADMIN — ENOXAPARIN SODIUM 40 MILLIGRAM(S): 30 INJECTION SUBCUTANEOUS at 15:01

## 2024-11-24 RX ADMIN — METOCLOPRAMIDE HYDROCHLORIDE 10 MILLIGRAM(S): 10 TABLET ORAL at 22:26

## 2024-11-24 RX ADMIN — Medication 10.4 GM/KG/DAY: at 23:26

## 2024-11-24 RX ADMIN — Medication 1: at 05:24

## 2024-11-24 RX ADMIN — Medication 1 EACH: at 23:25

## 2024-11-24 RX ADMIN — CHLORHEXIDINE GLUCONATE 1 APPLICATION(S): 1.2 RINSE ORAL at 11:23

## 2024-11-24 RX ADMIN — METOCLOPRAMIDE HYDROCHLORIDE 10 MILLIGRAM(S): 10 TABLET ORAL at 15:01

## 2024-11-24 RX ADMIN — Medication 1: at 11:23

## 2024-11-24 RX ADMIN — METOCLOPRAMIDE HYDROCHLORIDE 10 MILLIGRAM(S): 10 TABLET ORAL at 05:24

## 2024-11-24 RX ADMIN — Medication 1: at 18:51

## 2024-11-24 RX ADMIN — PANTOPRAZOLE SODIUM 40 MILLIGRAM(S): 40 TABLET, DELAYED RELEASE ORAL at 11:23

## 2024-11-24 NOTE — PROGRESS NOTE ADULT - SUBJECTIVE AND OBJECTIVE BOX
INTERVAL HPI/OVERNIGHT EVENTS:  Pt resting comfortably. Tolerating diet.   Diet, Clear Liquid (11-23-24 @ 15:38) [Active]    Parenteral Nutrition - Adult 1 Each (83 mL/Hr) TPN Continuous <Continuous>, 11-24-24 @ 22:00, , Stop order after: 1 Days  Parenteral Nutrition - Adult 1 Each (83 mL/Hr) TPN Continuous <Continuous>, 11-23-24 @ 22:00, , Stop order after: 1 Days    MEDICATIONS  (STANDING):  benzocaine/menthol Lozenge 1 Lozenge Oral once  chlorhexidine 2% Cloths 1 Application(s) Topical daily  dextrose 5%. 1000 milliLiter(s) (50 mL/Hr) IV Continuous <Continuous>  dextrose 5%. 1000 milliLiter(s) (100 mL/Hr) IV Continuous <Continuous>  dextrose 50% Injectable 25 Gram(s) IV Push once  dextrose 50% Injectable 12.5 Gram(s) IV Push once  dextrose 50% Injectable 25 Gram(s) IV Push once  diatrizoate meglumine/diatrizoate sodium. 90 milliLiter(s) Oral once  enoxaparin Injectable 40 milliGRAM(s) SubCutaneous every 24 hours  glucagon  Injectable 1 milliGRAM(s) IntraMuscular once  insulin lispro (ADMELOG) corrective regimen sliding scale   SubCutaneous every 6 hours  lipid, fat emulsion (Fish Oil and Plant Based) 20% Infusion 0.7764 Gm/kG/Day (10.4 mL/Hr) IV Continuous <Continuous>  lipid, fat emulsion (Fish Oil and Plant Based) 20% Infusion 0.7764 Gm/kG/Day (10.4 mL/Hr) IV Continuous <Continuous>  metoclopramide Injectable 10 milliGRAM(s) IV Push every 8 hours  pantoprazole  Injectable 40 milliGRAM(s) IV Push every 24 hours  Parenteral Nutrition - Adult 1 Each (83 mL/Hr) TPN Continuous <Continuous>  Parenteral Nutrition - Adult 1 Each (83 mL/Hr) TPN Continuous <Continuous>    MEDICATIONS  (PRN):  dextrose Oral Gel 15 Gram(s) Oral once PRN Blood Glucose LESS THAN 70 milliGRAM(s)/deciliter  hydrALAZINE Injectable 10 milliGRAM(s) IV Push every 6 hours PRN systolic BP > 160  ondansetron Injectable 4 milliGRAM(s) IV Push every 6 hours PRN Nausea  sodium chloride 0.9% lock flush 10 milliLiter(s) IV Push every 1 hour PRN Pre/post blood products, medications, blood draw, and to maintain line patency      Vital Signs Last 24 Hrs  T(C): 36.3 (24 Nov 2024 12:50), Max: 37 (23 Nov 2024 20:38)  T(F): 97.3 (24 Nov 2024 12:50), Max: 98.6 (23 Nov 2024 20:38)  HR: 71 (24 Nov 2024 12:50) (67 - 77)  BP: 125/78 (24 Nov 2024 12:50) (113/70 - 125/78)  BP(mean): 94 (24 Nov 2024 12:50) (94 - 94)  RR: 18 (24 Nov 2024 12:50) (18 - 18)  SpO2: 98% (24 Nov 2024 12:50) (97% - 98%)    Parameters below as of 24 Nov 2024 12:50  Patient On (Oxygen Delivery Method): room air    Physical:  General: A&Ox3. NAD.  Abdomen: Soft nondistended, nontender    I&O's Detail  23 Nov 2024 07:01  -  24 Nov 2024 07:00  --------------------------------------------------------  IN:  Total IN: 0 mL    OUT:    Oral Fluid: 0 mL    Voided (mL): 1100 mL  Total OUT: 1100 mL  Total NET: -1100 mL      LABS:                      12.0   4.58  )-----------( 247      ( 24 Nov 2024 06:50 )             35.9             11-24    140  |  111[H]  |  16  ----------------------------<  156[H]  3.9   |  26  |  0.75    Ca    8.4      24 Nov 2024 06:50  Phos  3.7     11-24  Mg     1.9     11-24    TPro  6.3  /  Alb  2.6[L]  /  TBili  0.4  /  DBili  0.1  /  AST  44[H]  /  ALT  52  /  AlkPhos  147[H]  11-24 Manual Manifest - Daily wearOD-2.00sphere+2.5014CF 2ft&nbsp;SN &nbsp; &nbsp; bmb

## 2024-11-24 NOTE — PROGRESS NOTE ADULT - ASSESSMENT
Patient is a 74y Male with h/o gastric ulcer (s/p partial gastrectomy and Bilroth II reconstruction in 1970s), VASILIY, HTN , s/p appendectomy (1980s), s/p hydrocele (1990s) [facility] s/p Ex-lap, SHANON with small bowel resection on 10/28  presents with n/v and abd pain. Pt a/w recurrent SBO and Bacteremia. . Nutrition consulted for TPN.   Repeat BCX 11/8 NG  s/p EGD 11/13 with +Nasojejunal enteral tube placement    1. Severe PCM- + wt loss, NPO since 11/6 (pt states he has not eaten even 1 week PTA).  Naso-jejunal tube removed; BCx 11/8- NG. s/p IR PICC placed 11/15. Pt initiated on TPN on 11/16 due to prolonged NPO status.   c/w TPN with lipids @ 2L. Keep off IVF.   FS acceptable. c/w FS q6hrs while on TPN. c/w ISS.  hgbA1C 5.9 on 11/14.   Triglycerides 232 on 11/21; c/w lipids.  LFTs wnl. Check CMP/Mg/Phos/ Ionized calcium in am.     Daily weights. Strict I/O. Hold TPN if pt spikes fever and check BCX x2    2. Hypophosphatemia- resolved with phos in TPN.  Monitor K/Mg/ Phos daily.   3. Bacteremia/ Enteritis- Finished Ceftriaxone/ Flagyl. BCx+ 11/ 6- Bacteroides fragilis. Repeat BCx 11/8 NG. ID following  4. Essential HTN- BP acceptable. Pt on Hydralazine 10mg IV q 6hr prn SBP >160. Monitor BP  5. SBO-Pt had BM today; pending abd xray.  plan as per surgery       TPN orders    Weight 64.4 kg  Total Kcal 1800  Fat 50g = 500 kcal  Protein 78g = 312 kcal  Dextrose 290g = 988 kcal    [facility] NEPHROLOGY  Jose Angel Horner M.D.  Caden Heck D.O.  Amalia Allen M.D.  MD Eugenia Cazares, MSN, ANP-C    Telephone: [phone]  Facsimile: [phone]    [address] [place] [address], [address]  [place] [address]

## 2024-11-24 NOTE — PROGRESS NOTE ADULT - NSPROGADDITIONALINFOA_GEN_ALL_CORE
I have personally seen and examined the patient with surgical team. Agree with the history, physical exam, and plan as documented by the PA.   As in above full notation.  Vitals non-suggestive.  Wounds clean and abdomen soft   Clinically, improving overall.  Tolerating CLD passing gas having BMs  Surgically, stable at present.  To continue current supportive care, starting weaning TPN, will adv to FLD  Reviewed with patient in detail, Surgical team as well as RN's.

## 2024-11-24 NOTE — PROGRESS NOTE ADULT - SUBJECTIVE AND OBJECTIVE BOX
[facility] NEPHROLOGY- PROGRESS NOTE    Patient is a 74y Male with h/o gastric ulcer (s/p partial gastrectomy and Bilroth II reconstruction in 1970s), VASILIY, HTN , s/p appendectomy (1980s), s/p hydrocele (1990s) recently admitted to [facility] 10/28-11/3 for SBO s/p Ex-lap, SHANON with small bowel resection on 10/28  presents with n/v and abd pain. Pt a/w recurrent SBO and Bacteremia. . Nutrition consulted for TPN.   Repeat BCX 11/8 NG  s/p EGD 11/13 with +Nasojejunal enteral tube placement  However NJ tube kinked and removed on 11/15.   s/p IR LUE PICC placed on 11/15    Hospital Medications: Medications reviewed.  REVIEW OF SYSTEMS:  CONSTITUTIONAL: No fevers or chills +sore throat with clear phelgm  RESPIRATORY: No shortness of breath  CARDIOVASCULAR: No chest pain.  GASTROINTESTINAL: +BM today. No nausea, vomiting, or abdominal pain.    VASCULAR: No bilateral lower extremity edema.          VITALS:  T(F): 97.3 (11-24-24 @ 12:50), Max: 98.6 (11-23-24 @ 20:38)  HR: 71 (11-24-24 @ 12:50)  BP: 125/78 (11-24-24 @ 12:50)  RR: 18 (11-24-24 @ 12:50)  SpO2: 98% (11-24-24 @ 12:50)  Wt(kg): --    11-23 @ 07:01  -  11-24 @ 07:00  --------------------------------------------------------  IN: 0 mL / OUT: 1100 mL / NET: -1100 mL        PHYSICAL EXAM:  Constitutional: NAD,  Respiratory: CTA b/l  Cardiovascular: S1, S2, RRR  Gastrointestinal: soft, midline staples   +BS  Extremities:  No peripheral edema  Access: Left PICC line- intact        LABS:  11-24    140  |  111[H]  |  16  ----------------------------<  156[H]  3.9   |  26  |  0.75    Ca    8.4      24 Nov 2024 06:50  Phos  3.7     11-24  Mg     1.9     11-24    TPro  6.3  /  Alb  2.6[L]  /  TBili  0.4  /  DBili  0.1  /  AST  44[H]  /  ALT  52  /  AlkPhos  147[H]  11-24    Creatinine Trend: 0.75 <--, 0.80 <--, 0.79 <--, 0.82 <--, 0.84 <--, 0.80 <--, 0.84 <--                        12.0   4.58  )-----------( 247      ( 24 Nov 2024 06:50 )             35.9     Urine Studies:  Urinalysis Basic - ( 24 Nov 2024 06:50 )    Color:  / Appearance:  / SG:  / pH:   Gluc: 156 mg/dL / Ketone:   / Bili:  / Urobili:    Blood:  / Protein:  / Nitrite:    Leuk Esterase:  / RBC:  / WBC    Sq Epi:  / Non Sq Epi:  / Bacteria:

## 2024-11-24 NOTE — PROGRESS NOTE ADULT - ASSESSMENT
74M  s/p exlap lysis of adhesions, small bowel resection readmitted with recurrent SBO.  s/p EGD on 11/13; stricture was found which was dilated, feeding tube passed through that portion and subsequently removed 11/16   L PICC placed 11/15  currently tolerating diet    - advance diet  -Continue TPN, per nephro  -Multimodal pain control PRN  -Replete electrolytes PRN  -Daily OOBTC and continue ambulation   -Continue Lovenox for dvt ppx  -Monitor return bowel function  -Completed course of IV ABx per ID

## 2024-11-25 LAB
ALBUMIN SERPL ELPH-MCNC: 2.8 G/DL — LOW (ref 3.5–5)
ALP SERPL-CCNC: 147 U/L — HIGH (ref 40–120)
ALT FLD-CCNC: 48 U/L DA — SIGNIFICANT CHANGE UP (ref 10–60)
ANION GAP SERPL CALC-SCNC: 3 MMOL/L — LOW (ref 5–17)
ANISOCYTOSIS BLD QL: SLIGHT — SIGNIFICANT CHANGE UP
AST SERPL-CCNC: 33 U/L — SIGNIFICANT CHANGE UP (ref 10–40)
BASOPHILS # BLD AUTO: 0.04 K/UL — SIGNIFICANT CHANGE UP (ref 0–0.2)
BASOPHILS NFR BLD AUTO: 1 % — SIGNIFICANT CHANGE UP (ref 0–2)
BILIRUB SERPL-MCNC: 0.2 MG/DL — SIGNIFICANT CHANGE UP (ref 0.2–1.2)
BUN SERPL-MCNC: 15 MG/DL — SIGNIFICANT CHANGE UP (ref 7–18)
CA-I BLD-SCNC: 4.8 MG/DL — SIGNIFICANT CHANGE UP (ref 4.5–5.6)
CA-I BLD-SCNC: 4.8 MG/DL — SIGNIFICANT CHANGE UP (ref 4.5–5.6)
CALCIUM SERPL-MCNC: 8.4 MG/DL — SIGNIFICANT CHANGE UP (ref 8.4–10.5)
CHLORIDE SERPL-SCNC: 110 MMOL/L — HIGH (ref 96–108)
CO2 SERPL-SCNC: 28 MMOL/L — SIGNIFICANT CHANGE UP (ref 22–31)
CREAT SERPL-MCNC: 0.73 MG/DL — SIGNIFICANT CHANGE UP (ref 0.5–1.3)
DACRYOCYTES BLD QL SMEAR: SLIGHT — SIGNIFICANT CHANGE UP
EGFR: 95 ML/MIN/1.73M2 — SIGNIFICANT CHANGE UP
EOSINOPHIL # BLD AUTO: 0.09 K/UL — SIGNIFICANT CHANGE UP (ref 0–0.5)
EOSINOPHIL NFR BLD AUTO: 2 % — SIGNIFICANT CHANGE UP (ref 0–6)
GLUCOSE BLDC GLUCOMTR-MCNC: 146 MG/DL — HIGH (ref 70–99)
GLUCOSE BLDC GLUCOMTR-MCNC: 151 MG/DL — HIGH (ref 70–99)
GLUCOSE BLDC GLUCOMTR-MCNC: 154 MG/DL — HIGH (ref 70–99)
GLUCOSE BLDC GLUCOMTR-MCNC: 168 MG/DL — HIGH (ref 70–99)
GLUCOSE BLDC GLUCOMTR-MCNC: 170 MG/DL — HIGH (ref 70–99)
GLUCOSE BLDC GLUCOMTR-MCNC: 198 MG/DL — HIGH (ref 70–99)
GLUCOSE SERPL-MCNC: 168 MG/DL — HIGH (ref 70–99)
HCT VFR BLD CALC: 36.6 % — LOW (ref 39–50)
HGB BLD-MCNC: 12 G/DL — LOW (ref 13–17)
LG PLATELETS BLD QL AUTO: SLIGHT — SIGNIFICANT CHANGE UP
LYMPHOCYTES # BLD AUTO: 1.11 K/UL — SIGNIFICANT CHANGE UP (ref 1–3.3)
LYMPHOCYTES # BLD AUTO: 25 % — SIGNIFICANT CHANGE UP (ref 13–44)
MAGNESIUM SERPL-MCNC: 2 MG/DL — SIGNIFICANT CHANGE UP (ref 1.6–2.6)
MANUAL SMEAR VERIFICATION: SIGNIFICANT CHANGE UP
MCHC RBC-ENTMCNC: 28.2 PG — SIGNIFICANT CHANGE UP (ref 27–34)
MCHC RBC-ENTMCNC: 32.8 G/DL — SIGNIFICANT CHANGE UP (ref 32–36)
MCV RBC AUTO: 85.9 FL — SIGNIFICANT CHANGE UP (ref 80–100)
MONOCYTES # BLD AUTO: 0.67 K/UL — SIGNIFICANT CHANGE UP (ref 0–0.9)
MONOCYTES NFR BLD AUTO: 15 % — HIGH (ref 2–14)
NEUTROPHILS # BLD AUTO: 2.44 K/UL — SIGNIFICANT CHANGE UP (ref 1.8–7.4)
NEUTROPHILS NFR BLD AUTO: 55 % — SIGNIFICANT CHANGE UP (ref 43–77)
NRBC # BLD: 0 /100 WBCS — SIGNIFICANT CHANGE UP (ref 0–0)
OVALOCYTES BLD QL SMEAR: SLIGHT — SIGNIFICANT CHANGE UP
PHOSPHATE SERPL-MCNC: 3.3 MG/DL — SIGNIFICANT CHANGE UP (ref 2.5–4.5)
PLAT MORPH BLD: NORMAL — SIGNIFICANT CHANGE UP
PLATELET # BLD AUTO: 222 K/UL — SIGNIFICANT CHANGE UP (ref 150–400)
PLATELET COUNT - ESTIMATE: NORMAL — SIGNIFICANT CHANGE UP
POIKILOCYTOSIS BLD QL AUTO: SLIGHT — SIGNIFICANT CHANGE UP
POTASSIUM SERPL-MCNC: 4.1 MMOL/L — SIGNIFICANT CHANGE UP (ref 3.5–5.3)
POTASSIUM SERPL-SCNC: 4.1 MMOL/L — SIGNIFICANT CHANGE UP (ref 3.5–5.3)
PROT SERPL-MCNC: 6.2 G/DL — SIGNIFICANT CHANGE UP (ref 6–8.3)
RBC # BLD: 4.26 M/UL — SIGNIFICANT CHANGE UP (ref 4.2–5.8)
RBC # FLD: 15.6 % — HIGH (ref 10.3–14.5)
RBC BLD AUTO: ABNORMAL
SODIUM SERPL-SCNC: 141 MMOL/L — SIGNIFICANT CHANGE UP (ref 135–145)
TRIGL SERPL-MCNC: 289 MG/DL — HIGH
VARIANT LYMPHS # BLD: 2 % — SIGNIFICANT CHANGE UP (ref 0–6)
WBC # BLD: 4.44 K/UL — SIGNIFICANT CHANGE UP (ref 3.8–10.5)
WBC # FLD AUTO: 4.44 K/UL — SIGNIFICANT CHANGE UP (ref 3.8–10.5)

## 2024-11-25 RX ORDER — ENOXAPARIN SODIUM 30 MG/.3ML
40 INJECTION SUBCUTANEOUS EVERY 24 HOURS
Refills: 0 | Status: DISCONTINUED | OUTPATIENT
Start: 2024-11-25 | End: 2024-11-27

## 2024-11-25 RX ORDER — FAT EMULSIONS 20 %
0.78 EMULSION INTRAVENOUS
Qty: 50 | Refills: 0 | Status: DISCONTINUED | OUTPATIENT
Start: 2024-11-25 | End: 2024-11-25

## 2024-11-25 RX ORDER — SODIUM/POT/MAG/CALC/CHLOR/ACET 35-20-5MEQ
1 VIAL (ML) INTRAVENOUS
Refills: 0 | Status: DISCONTINUED | OUTPATIENT
Start: 2024-11-25 | End: 2024-11-25

## 2024-11-25 RX ORDER — PANTOPRAZOLE SODIUM 40 MG/1
40 TABLET, DELAYED RELEASE ORAL EVERY 24 HOURS
Refills: 0 | Status: DISCONTINUED | OUTPATIENT
Start: 2024-11-25 | End: 2024-11-27

## 2024-11-25 RX ADMIN — METOCLOPRAMIDE HYDROCHLORIDE 10 MILLIGRAM(S): 10 TABLET ORAL at 15:26

## 2024-11-25 RX ADMIN — Medication 1: at 17:51

## 2024-11-25 RX ADMIN — METOCLOPRAMIDE HYDROCHLORIDE 10 MILLIGRAM(S): 10 TABLET ORAL at 05:15

## 2024-11-25 RX ADMIN — Medication 83 EACH: at 22:20

## 2024-11-25 RX ADMIN — ENOXAPARIN SODIUM 40 MILLIGRAM(S): 30 INJECTION SUBCUTANEOUS at 15:26

## 2024-11-25 RX ADMIN — Medication 1: at 06:19

## 2024-11-25 RX ADMIN — Medication 1 EACH: at 22:29

## 2024-11-25 RX ADMIN — Medication 1: at 01:34

## 2024-11-25 RX ADMIN — Medication 10.4 GM/KG/DAY: at 22:26

## 2024-11-25 RX ADMIN — CHLORHEXIDINE GLUCONATE 1 APPLICATION(S): 1.2 RINSE ORAL at 11:39

## 2024-11-25 RX ADMIN — Medication 1: at 11:38

## 2024-11-25 RX ADMIN — PANTOPRAZOLE SODIUM 40 MILLIGRAM(S): 40 TABLET, DELAYED RELEASE ORAL at 11:38

## 2024-11-25 NOTE — PROGRESS NOTE ADULT - ASSESSMENT
Patient is a 74y Male with h/o gastric ulcer (s/p partial gastrectomy and Bilroth II reconstruction in 1970s), VASILIY, HTN , s/p appendectomy (1980s), s/p hydrocele (1990s) recently admitted to [facility] 10/28-11/3 for SBO s/p Ex-lap, SHANON with small bowel resection on 10/28  presents with n/v and abd pain. Pt a/w recurrent SBO and Bacteremia. . Nutrition consulted for TPN.   Repeat BCX 11/8 NG  s/p EGD 11/13 with +Nasojejunal enteral tube placement    1. Severe PCM- + wt loss, NPO since 11/6 (pt states he has not eaten even 1 week PTA).  Naso-jejunal tube removed; BCx 11/8- NG. s/p IR PICC placed 11/15. Pt initiated on TPN on 11/16 due to prolonged NPO status.   c/w TPN with lipids @ 2L. Keep off IVF. Plan to wean off TPN as his oral intake improves.  FS acceptable. c/w FS q6hrs while on TPN. c/w ISS.  hgbA1C 5.9 on 11/14.   c/w lipids. If triglycerides continue to increase, then will adjust lipid content in TPN. LFTs wnl. Check CMP/Mg/Phos/ Ionized calcium in am.     Daily weights. Strict I/O. Hold TPN if pt spikes fever and check BCX x2    2. Hypophosphatemia- resolved with phos in TPN.  Monitor K/Mg/ Phos daily.   3. Bacteremia/ Enteritis- Finished Ceftriaxone/ Flagyl. BCx+ 11/ 6- Bacteroides fragilis. Repeat BCx 11/8 NG. ID following  4. Essential HTN- BP acceptable. Pt on Hydralazine 10mg IV q 6hr prn SBP >160. Monitor BP  5. SBO-Pt had BM today; pending abd xray.  plan as per surgery       TPN orders    Weight 64.4 kg  Total Kcal 1800  Fat 50g = 500 kcal  Protein 78g = 312 kcal  Dextrose 290g = 988 kcal    [facility] NEPHROLOGY  Jose Angel Horner M.D.  Caden Heck D.O.  Amalia Allen M.D.  MD Eugenia Cazares, MSN, ANP-C    Telephone: [phone]  Facsimile: [phone] [place] [address], [address]  [place] [address]

## 2024-11-25 NOTE — PROGRESS NOTE ADULT - SUBJECTIVE AND OBJECTIVE BOX
[facility] NEPHROLOGY- PROGRESS NOTE    Patient is a 74y Male with h/o gastric ulcer (s/p partial gastrectomy and Bilroth II reconstruction in 1970s), VASILIY, HTN , s/p appendectomy (1980s), s/p hydrocele (1990s) recently admitted to [facility] 10/28-11/3 for SBO s/p Ex-lap, SHANON with small bowel resection on 10/28  presents with n/v and abd pain. Pt a/w recurrent SBO and Bacteremia. . Nutrition consulted for TPN.   Repeat BCX 11/8 NG  s/p EGD 11/13 with +Nasojejunal enteral tube placement  However NJ tube kinked and removed on 11/15.   s/p IR LUE PICC placed on 11/15    Reports improvement in his ability to tolerate oral intake.    Hospital Medications: Medications reviewed.  REVIEW OF SYSTEMS:  CONSTITUTIONAL: No fevers or chills.  RESPIRATORY: No shortness of breath  CARDIOVASCULAR: No chest pain.  GASTROINTESTINAL: No nausea, vomiting, or abdominal pain.    VASCULAR: No bilateral lower extremity edema.        VITALS:  T(F): 98.3 (11-25-24 @ 05:26), Max: 98.3 (11-25-24 @ 05:26)  HR: 71 (11-25-24 @ 05:26)  BP: 119/75 (11-25-24 @ 05:26)  RR: 18 (11-25-24 @ 05:26)  SpO2: 98% (11-25-24 @ 05:26)  Wt(kg): --    11-24 @ 07:01  -  11-25 @ 07:00  --------------------------------------------------------  IN: 300 mL / OUT: 1050 mL / NET: -750 mL        PHYSICAL EXAM:  Constitutional: NAD,  Respiratory: CTA b/l  Cardiovascular: S1, S2, RRR  Gastrointestinal: soft, midline staples   +BS  Extremities:  No peripheral edema  Access: Left PICC line- intact      LABS:  11-25    141  |  110[H]  |  15  ----------------------------<  168[H]  4.1   |  28  |  0.73    Ca    8.4      25 Nov 2024 07:17  Phos  3.3     11-25  Mg     2.0     11-25    TPro  6.2  /  Alb  2.8[L]  /  TBili  0.2  /  DBili      /  AST  33  /  ALT  48  /  AlkPhos  147[H]  11-25    Creatinine Trend: 0.73 <--, 0.75 <--, 0.80 <--, 0.79 <--, 0.82 <--, 0.84 <--, 0.80 <--                        12.0   4.44  )-----------( 222      ( 25 Nov 2024 07:17 )             36.6     Urine Studies:  Urinalysis Basic - ( 25 Nov 2024 07:17 )    Color:  / Appearance:  / SG:  / pH:   Gluc: 168 mg/dL / Ketone:   / Bili:  / Urobili:    Blood:  / Protein:  / Nitrite:    Leuk Esterase:  / RBC:  / WBC    Sq Epi:  / Non Sq Epi:  / Bacteria:

## 2024-11-25 NOTE — CHART NOTE - NSCHARTNOTEFT_GEN_A_CORE
Pt seen for severe malnutrition follow up     Spoke to pt at bedside. No recent episodes of nausea, vomiting, diarrhea or constipation per pt. Last BM noted on 11/24 per RN flowsheets. Pt receiving 2L TPN solution, currently on goal rate of 83.3 ml/hr. POCT 129 - 191 between 11/22-11/25. Triglycerides increased from 232 to 263, nutrition support doctor aware. Per nutrition support doctor, if triglycerides continue to increase, then will adjust lipid content in TPN.  RD to remain available.    Diet Prescription: Diet, Full Liquid:   Supplement Feeding Modality:  Oral  Ensure Clear Cans or Servings Per Day:  1       Frequency:  Three Times a day (11-24-24 @ 13:49)    Pertinent Medications: MEDICATIONS  (STANDING):  benzocaine/menthol Lozenge 1 Lozenge Oral once  chlorhexidine 2% Cloths 1 Application(s) Topical daily  dextrose 5%. 1000 milliLiter(s) (50 mL/Hr) IV Continuous <Continuous>  dextrose 5%. 1000 milliLiter(s) (100 mL/Hr) IV Continuous <Continuous>  dextrose 50% Injectable 25 Gram(s) IV Push once  dextrose 50% Injectable 12.5 Gram(s) IV Push once  dextrose 50% Injectable 25 Gram(s) IV Push once  diatrizoate meglumine/diatrizoate sodium. 90 milliLiter(s) Oral once  enoxaparin Injectable 40 milliGRAM(s) SubCutaneous every 24 hours  glucagon  Injectable 1 milliGRAM(s) IntraMuscular once  insulin lispro (ADMELOG) corrective regimen sliding scale   SubCutaneous every 6 hours  lipid, fat emulsion (Fish Oil and Plant Based) 20% Infusion 0.7764 Gm/kG/Day (10.4 mL/Hr) IV Continuous <Continuous>  lipid, fat emulsion (Fish Oil and Plant Based) 20% Infusion 0.7764 Gm/kG/Day (10.4 mL/Hr) IV Continuous <Continuous>  metoclopramide Injectable 10 milliGRAM(s) IV Push every 8 hours  pantoprazole  Injectable 40 milliGRAM(s) IV Push every 24 hours  Parenteral Nutrition - Adult 1 Each (83 mL/Hr) TPN Continuous <Continuous>  Parenteral Nutrition - Adult 1 Each (83 mL/Hr) TPN Continuous <Continuous>    MEDICATIONS  (PRN):  dextrose Oral Gel 15 Gram(s) Oral once PRN Blood Glucose LESS THAN 70 milliGRAM(s)/deciliter  hydrALAZINE Injectable 10 milliGRAM(s) IV Push every 6 hours PRN systolic BP > 160  ondansetron Injectable 4 milliGRAM(s) IV Push every 6 hours PRN Nausea  sodium chloride 0.9% lock flush 10 milliLiter(s) IV Push every 1 hour PRN Pre/post blood products, medications, blood draw, and to maintain line patency    Pertinent Labs: 11-25 Na141 mmol/L Glu 168 mg/dL[H] K+ 4.1 mmol/L Cr  0.73 mg/dL BUN 15 mg/dL 11-25 Phos 3.3 mg/dL 11-25 Alb 2.8 g/dL[L] 11-25 Chol --    LDL --    HDL --    Trig 289 mg/dL[H]     CAPILLARY BLOOD GLUCOSE      POCT Blood Glucose.: 170 mg/dL (25 Nov 2024 11:24)  POCT Blood Glucose.: 151 mg/dL (25 Nov 2024 05:53)  POCT Blood Glucose.: 154 mg/dL (25 Nov 2024 00:43)  POCT Blood Glucose.: 158 mg/dL (24 Nov 2024 18:29)      Weight: 142 lbs  Height: 66 in   IBW: 142 lbs +/-10%  BMI: 22.9 kg/m^2    Physical Assessment, per flowsheets:  Edema: No edema noted per RN flowsheets   Pressure Injury: No pressure injuries noted per RN flowsheets     Estimated Needs:   [X] No change since previous assessment    Previous Nutrition Diagnosis: [X] Malnutrition   Nutrition Diagnosis is [X] ongoing   New Nutrition Diagnosis: [X] not applicable     Education:  [X] Provided on previous assessment by RD    Interventions:   1) Continue current TPN at goal rate as medically feasible.  2) Monitor TPN rate, weights, BM's, I/O's, lipid labs, POCT, ALT/AST, bilirubin and skin integrity.    Rolando Mitchell RD (Available on teams). Pt seen for severe malnutrition follow up     Spoke to pt at bedside. Pt currently on low fiber diet, continue to monitor tolerance. No recent episodes of nausea, vomiting, diarrhea or constipation per pt. Last BM noted on 11/24 per RN flowsheets. Pt receiving 2L TPN solution, currently on goal rate of 83.3 ml/hr. POCT 129 - 191 between 11/22-11/25. Triglycerides increased from 232 to 263, nutrition support doctor aware. Per nutrition support doctor, if triglycerides continue to increase, then will adjust lipid content in TPN.  RD to remain available.    Diet Prescription: Diet, Full Liquid:   Supplement Feeding Modality:  Oral  Ensure Clear Cans or Servings Per Day:  1       Frequency:  Three Times a day (11-24-24 @ 13:49)    Pertinent Medications: MEDICATIONS  (STANDING):  benzocaine/menthol Lozenge 1 Lozenge Oral once  chlorhexidine 2% Cloths 1 Application(s) Topical daily  dextrose 5%. 1000 milliLiter(s) (50 mL/Hr) IV Continuous <Continuous>  dextrose 5%. 1000 milliLiter(s) (100 mL/Hr) IV Continuous <Continuous>  dextrose 50% Injectable 25 Gram(s) IV Push once  dextrose 50% Injectable 12.5 Gram(s) IV Push once  dextrose 50% Injectable 25 Gram(s) IV Push once  diatrizoate meglumine/diatrizoate sodium. 90 milliLiter(s) Oral once  enoxaparin Injectable 40 milliGRAM(s) SubCutaneous every 24 hours  glucagon  Injectable 1 milliGRAM(s) IntraMuscular once  insulin lispro (ADMELOG) corrective regimen sliding scale   SubCutaneous every 6 hours  lipid, fat emulsion (Fish Oil and Plant Based) 20% Infusion 0.7764 Gm/kG/Day (10.4 mL/Hr) IV Continuous <Continuous>  lipid, fat emulsion (Fish Oil and Plant Based) 20% Infusion 0.7764 Gm/kG/Day (10.4 mL/Hr) IV Continuous <Continuous>  metoclopramide Injectable 10 milliGRAM(s) IV Push every 8 hours  pantoprazole  Injectable 40 milliGRAM(s) IV Push every 24 hours  Parenteral Nutrition - Adult 1 Each (83 mL/Hr) TPN Continuous <Continuous>  Parenteral Nutrition - Adult 1 Each (83 mL/Hr) TPN Continuous <Continuous>    MEDICATIONS  (PRN):  dextrose Oral Gel 15 Gram(s) Oral once PRN Blood Glucose LESS THAN 70 milliGRAM(s)/deciliter  hydrALAZINE Injectable 10 milliGRAM(s) IV Push every 6 hours PRN systolic BP > 160  ondansetron Injectable 4 milliGRAM(s) IV Push every 6 hours PRN Nausea  sodium chloride 0.9% lock flush 10 milliLiter(s) IV Push every 1 hour PRN Pre/post blood products, medications, blood draw, and to maintain line patency    Pertinent Labs: 11-25 Na141 mmol/L Glu 168 mg/dL[H] K+ 4.1 mmol/L Cr  0.73 mg/dL BUN 15 mg/dL 11-25 Phos 3.3 mg/dL 11-25 Alb 2.8 g/dL[L] 11-25 Chol --    LDL --    HDL --    Trig 289 mg/dL[H]     CAPILLARY BLOOD GLUCOSE      POCT Blood Glucose.: 170 mg/dL (25 Nov 2024 11:24)  POCT Blood Glucose.: 151 mg/dL (25 Nov 2024 05:53)  POCT Blood Glucose.: 154 mg/dL (25 Nov 2024 00:43)  POCT Blood Glucose.: 158 mg/dL (24 Nov 2024 18:29)      Weight: 142 lbs  Height: 66 in   IBW: 142 lbs +/-10%  BMI: 22.9 kg/m^2    Physical Assessment, per flowsheets:  Edema: No edema noted per RN flowsheets   Pressure Injury: No pressure injuries noted per RN flowsheets     Estimated Needs:   [X] No change since previous assessment    Previous Nutrition Diagnosis: [X] Malnutrition   Nutrition Diagnosis is [X] ongoing   New Nutrition Diagnosis: [X] not applicable     Education:  [X] Provided on previous assessment by RD    Interventions:   1) Continue current TPN at goal rate as medically feasible.  2) Monitor TPN rate, weights, BM's, I/O's, lipid labs, POCT, ALT/AST, bilirubin and skin integrity.    Rolando Mitchell RD (Available on teams).

## 2024-11-25 NOTE — PROGRESS NOTE ADULT - SUBJECTIVE AND OBJECTIVE BOX
Patient assessed at bedside, remains hemodynamically stable and afebrile. No acute events overnight. Patient admits to feeling well, tolerating full liquids, having BMs and passing flatus. Remains on TPN.    MEDICATIONS  (STANDING):  benzocaine/menthol Lozenge 1 Lozenge Oral once  chlorhexidine 2% Cloths 1 Application(s) Topical daily  dextrose 5%. 1000 milliLiter(s) (50 mL/Hr) IV Continuous <Continuous>  dextrose 5%. 1000 milliLiter(s) (100 mL/Hr) IV Continuous <Continuous>  dextrose 50% Injectable 25 Gram(s) IV Push once  dextrose 50% Injectable 12.5 Gram(s) IV Push once  dextrose 50% Injectable 25 Gram(s) IV Push once  diatrizoate meglumine/diatrizoate sodium. 90 milliLiter(s) Oral once  enoxaparin Injectable 40 milliGRAM(s) SubCutaneous every 24 hours  glucagon  Injectable 1 milliGRAM(s) IntraMuscular once  insulin lispro (ADMELOG) corrective regimen sliding scale   SubCutaneous every 6 hours  lipid, fat emulsion (Fish Oil and Plant Based) 20% Infusion 0.7764 Gm/kG/Day (10.4 mL/Hr) IV Continuous <Continuous>  metoclopramide Injectable 10 milliGRAM(s) IV Push every 8 hours  pantoprazole  Injectable 40 milliGRAM(s) IV Push every 24 hours  Parenteral Nutrition - Adult 1 Each (83 mL/Hr) TPN Continuous <Continuous>    MEDICATIONS  (PRN):  dextrose Oral Gel 15 Gram(s) Oral once PRN Blood Glucose LESS THAN 70 milliGRAM(s)/deciliter  hydrALAZINE Injectable 10 milliGRAM(s) IV Push every 6 hours PRN systolic BP > 160  ondansetron Injectable 4 milliGRAM(s) IV Push every 6 hours PRN Nausea  sodium chloride 0.9% lock flush 10 milliLiter(s) IV Push every 1 hour PRN Pre/post blood products, medications, blood draw, and to maintain line patency      Vital Signs Last 24 Hrs  T(C): 36.8 (25 Nov 2024 05:26), Max: 36.8 (25 Nov 2024 05:26)  T(F): 98.3 (25 Nov 2024 05:26), Max: 98.3 (25 Nov 2024 05:26)  HR: 71 (25 Nov 2024 05:26) (67 - 71)  BP: 119/75 (25 Nov 2024 05:26) (117/74 - 125/78)  BP(mean): 94 (24 Nov 2024 12:50) (94 - 94)  RR: 18 (25 Nov 2024 05:26) (18 - 18)  SpO2: 98% (25 Nov 2024 05:26) (98% - 98%)    Parameters below as of 25 Nov 2024 05:26  Patient On (Oxygen Delivery Method): room air        Physical:  General: A&Ox3. NAD.  Abdomen: Soft nondistended, nontender, steri-strips remain in place, wound C/D/I                            12.0   4.58  )-----------( 247      ( 24 Nov 2024 06:50 )             35.9     11-24    140  |  111[H]  |  16  ----------------------------<  156[H]  3.9   |  26  |  0.75    Ca    8.4      24 Nov 2024 06:50  Phos  3.7     11-24  Mg     1.9     11-24    TPro  6.3  /  Alb  2.6[L]  /  TBili  0.4  /  DBili  0.1  /  AST  44[H]  /  ALT  52  /  AlkPhos  147[H]  11-24

## 2024-11-25 NOTE — PROGRESS NOTE ADULT - ASSESSMENT
74M  s/p exlap lysis of adhesions, small bowel resection readmitted with recurrent SBO.  s/p EGD on 11/13; stricture was found which was dilated, feeding tube passed through that portion and subsequently removed 11/16   L PICC placed 11/15      - advance diet to low residue  -Continue TPN, per nephro  -Multimodal pain control PRN  -Replete electrolytes PRN  -Daily OOBTC and continue ambulation   -Continue Lovenox for dvt ppx  -Completed course of IV ABx per ID

## 2024-11-25 NOTE — PROGRESS NOTE ADULT - ATTENDING COMMENTS
As in above full notation.  Vitals non-suggestive.  Wounds clean and abdomen soft with appropriate incisional tenderness.  Labs reassuring.  Clinically, improving overall. Tolerating FLD will adv to soft diet.   Surgically, stable at present.  To continue current supportive care. Plan for discharge tomorrow vs Wednesday. Start to wean TPN.  Reviewed with patient in detail, Surgical team as well as RN's.

## 2024-11-26 LAB
ALBUMIN SERPL ELPH-MCNC: 2.9 G/DL — LOW (ref 3.5–5)
ALP SERPL-CCNC: 141 U/L — HIGH (ref 40–120)
ALT FLD-CCNC: 40 U/L DA — SIGNIFICANT CHANGE UP (ref 10–60)
ANION GAP SERPL CALC-SCNC: 6 MMOL/L — SIGNIFICANT CHANGE UP (ref 5–17)
AST SERPL-CCNC: 24 U/L — SIGNIFICANT CHANGE UP (ref 10–40)
BASOPHILS # BLD AUTO: 0.06 K/UL — SIGNIFICANT CHANGE UP (ref 0–0.2)
BASOPHILS NFR BLD AUTO: 1 % — SIGNIFICANT CHANGE UP (ref 0–2)
BILIRUB SERPL-MCNC: 0.3 MG/DL — SIGNIFICANT CHANGE UP (ref 0.2–1.2)
BUN SERPL-MCNC: 15 MG/DL — SIGNIFICANT CHANGE UP (ref 7–18)
CA-I BLD-SCNC: 5 MG/DL — SIGNIFICANT CHANGE UP (ref 4.5–5.6)
CALCIUM SERPL-MCNC: 8.4 MG/DL — SIGNIFICANT CHANGE UP (ref 8.4–10.5)
CHLORIDE SERPL-SCNC: 110 MMOL/L — HIGH (ref 96–108)
CO2 SERPL-SCNC: 25 MMOL/L — SIGNIFICANT CHANGE UP (ref 22–31)
CREAT SERPL-MCNC: 0.74 MG/DL — SIGNIFICANT CHANGE UP (ref 0.5–1.3)
EGFR: 95 ML/MIN/1.73M2 — SIGNIFICANT CHANGE UP
EOSINOPHIL # BLD AUTO: 0.2 K/UL — SIGNIFICANT CHANGE UP (ref 0–0.5)
EOSINOPHIL NFR BLD AUTO: 3.4 % — SIGNIFICANT CHANGE UP (ref 0–6)
GLUCOSE BLDC GLUCOMTR-MCNC: 131 MG/DL — HIGH (ref 70–99)
GLUCOSE BLDC GLUCOMTR-MCNC: 163 MG/DL — HIGH (ref 70–99)
GLUCOSE BLDC GLUCOMTR-MCNC: 169 MG/DL — HIGH (ref 70–99)
GLUCOSE BLDC GLUCOMTR-MCNC: 174 MG/DL — HIGH (ref 70–99)
GLUCOSE BLDC GLUCOMTR-MCNC: 190 MG/DL — HIGH (ref 70–99)
GLUCOSE SERPL-MCNC: 168 MG/DL — HIGH (ref 70–99)
HCT VFR BLD CALC: 35.8 % — LOW (ref 39–50)
HGB BLD-MCNC: 12 G/DL — LOW (ref 13–17)
IMM GRANULOCYTES NFR BLD AUTO: 4.4 % — HIGH (ref 0–0.9)
LYMPHOCYTES # BLD AUTO: 1.06 K/UL — SIGNIFICANT CHANGE UP (ref 1–3.3)
LYMPHOCYTES # BLD AUTO: 17.8 % — SIGNIFICANT CHANGE UP (ref 13–44)
MAGNESIUM SERPL-MCNC: 2.1 MG/DL — SIGNIFICANT CHANGE UP (ref 1.6–2.6)
MCHC RBC-ENTMCNC: 28.4 PG — SIGNIFICANT CHANGE UP (ref 27–34)
MCHC RBC-ENTMCNC: 33.5 G/DL — SIGNIFICANT CHANGE UP (ref 32–36)
MCV RBC AUTO: 84.8 FL — SIGNIFICANT CHANGE UP (ref 80–100)
MONOCYTES # BLD AUTO: 0.56 K/UL — SIGNIFICANT CHANGE UP (ref 0–0.9)
MONOCYTES NFR BLD AUTO: 9.4 % — SIGNIFICANT CHANGE UP (ref 2–14)
NEUTROPHILS # BLD AUTO: 3.8 K/UL — SIGNIFICANT CHANGE UP (ref 1.8–7.4)
NEUTROPHILS NFR BLD AUTO: 64 % — SIGNIFICANT CHANGE UP (ref 43–77)
NRBC # BLD: 0 /100 WBCS — SIGNIFICANT CHANGE UP (ref 0–0)
PHOSPHATE SERPL-MCNC: 3 MG/DL — SIGNIFICANT CHANGE UP (ref 2.5–4.5)
PLATELET # BLD AUTO: 225 K/UL — SIGNIFICANT CHANGE UP (ref 150–400)
POTASSIUM SERPL-MCNC: 4.2 MMOL/L — SIGNIFICANT CHANGE UP (ref 3.5–5.3)
POTASSIUM SERPL-SCNC: 4.2 MMOL/L — SIGNIFICANT CHANGE UP (ref 3.5–5.3)
PROT SERPL-MCNC: 6.3 G/DL — SIGNIFICANT CHANGE UP (ref 6–8.3)
RBC # BLD: 4.22 M/UL — SIGNIFICANT CHANGE UP (ref 4.2–5.8)
RBC # FLD: 15.3 % — HIGH (ref 10.3–14.5)
SODIUM SERPL-SCNC: 141 MMOL/L — SIGNIFICANT CHANGE UP (ref 135–145)
TRIGL SERPL-MCNC: 305 MG/DL — HIGH
WBC # BLD: 5.94 K/UL — SIGNIFICANT CHANGE UP (ref 3.8–10.5)
WBC # FLD AUTO: 5.94 K/UL — SIGNIFICANT CHANGE UP (ref 3.8–10.5)

## 2024-11-26 PROCEDURE — 74018 RADEX ABDOMEN 1 VIEW: CPT

## 2024-11-26 PROCEDURE — 83735 ASSAY OF MAGNESIUM: CPT

## 2024-11-26 PROCEDURE — 85025 COMPLETE CBC W/AUTO DIFF WBC: CPT

## 2024-11-26 PROCEDURE — 85730 THROMBOPLASTIN TIME PARTIAL: CPT

## 2024-11-26 PROCEDURE — 36415 COLL VENOUS BLD VENIPUNCTURE: CPT

## 2024-11-26 PROCEDURE — 81001 URINALYSIS AUTO W/SCOPE: CPT

## 2024-11-26 PROCEDURE — 99285 EMERGENCY DEPT VISIT HI MDM: CPT

## 2024-11-26 PROCEDURE — 84100 ASSAY OF PHOSPHORUS: CPT

## 2024-11-26 PROCEDURE — 86901 BLOOD TYPING SEROLOGIC RH(D): CPT

## 2024-11-26 PROCEDURE — 88307 TISSUE EXAM BY PATHOLOGIST: CPT

## 2024-11-26 PROCEDURE — 84484 ASSAY OF TROPONIN QUANT: CPT

## 2024-11-26 PROCEDURE — 83690 ASSAY OF LIPASE: CPT

## 2024-11-26 PROCEDURE — 74177 CT ABD & PELVIS W/CONTRAST: CPT | Mod: MC

## 2024-11-26 PROCEDURE — 85027 COMPLETE CBC AUTOMATED: CPT

## 2024-11-26 PROCEDURE — 85610 PROTHROMBIN TIME: CPT

## 2024-11-26 PROCEDURE — C9399: CPT

## 2024-11-26 PROCEDURE — 96374 THER/PROPH/DIAG INJ IV PUSH: CPT

## 2024-11-26 PROCEDURE — 86900 BLOOD TYPING SEROLOGIC ABO: CPT

## 2024-11-26 PROCEDURE — 86850 RBC ANTIBODY SCREEN: CPT

## 2024-11-26 PROCEDURE — 80048 BASIC METABOLIC PNL TOTAL CA: CPT

## 2024-11-26 PROCEDURE — 90662 IIV NO PRSV INCREASED AG IM: CPT

## 2024-11-26 PROCEDURE — 87086 URINE CULTURE/COLONY COUNT: CPT

## 2024-11-26 PROCEDURE — 80053 COMPREHEN METABOLIC PANEL: CPT

## 2024-11-26 PROCEDURE — 93005 ELECTROCARDIOGRAM TRACING: CPT

## 2024-11-26 PROCEDURE — 97116 GAIT TRAINING THERAPY: CPT

## 2024-11-26 PROCEDURE — C1889: CPT

## 2024-11-26 PROCEDURE — 97161 PT EVAL LOW COMPLEX 20 MIN: CPT

## 2024-11-26 RX ORDER — FAT EMULSIONS 20 %
0.4 EMULSION INTRAVENOUS
Qty: 25.76 | Refills: 0 | Status: DISCONTINUED | OUTPATIENT
Start: 2024-11-26 | End: 2024-11-26

## 2024-11-26 RX ORDER — SODIUM/POT/MAG/CALC/CHLOR/ACET 35-20-5MEQ
1 VIAL (ML) INTRAVENOUS
Refills: 0 | Status: DISCONTINUED | OUTPATIENT
Start: 2024-11-26 | End: 2024-11-26

## 2024-11-26 RX ADMIN — Medication 1: at 13:34

## 2024-11-26 RX ADMIN — METOCLOPRAMIDE HYDROCHLORIDE 10 MILLIGRAM(S): 10 TABLET ORAL at 22:13

## 2024-11-26 RX ADMIN — Medication 1: at 05:28

## 2024-11-26 RX ADMIN — ENOXAPARIN SODIUM 40 MILLIGRAM(S): 30 INJECTION SUBCUTANEOUS at 15:51

## 2024-11-26 RX ADMIN — PANTOPRAZOLE SODIUM 40 MILLIGRAM(S): 40 TABLET, DELAYED RELEASE ORAL at 12:38

## 2024-11-26 RX ADMIN — METOCLOPRAMIDE HYDROCHLORIDE 10 MILLIGRAM(S): 10 TABLET ORAL at 15:52

## 2024-11-26 RX ADMIN — Medication 5.37 GM/KG/DAY: at 22:14

## 2024-11-26 RX ADMIN — CHLORHEXIDINE GLUCONATE 1 APPLICATION(S): 1.2 RINSE ORAL at 12:38

## 2024-11-26 RX ADMIN — Medication 83 EACH: at 22:14

## 2024-11-26 RX ADMIN — Medication 1: at 19:16

## 2024-11-26 NOTE — CHART NOTE - NSCHARTNOTEFT_GEN_A_CORE
Follow Up Note:    Request for TPN Adjustment or D/C due to possible Discharge 24  Patient is a 74y old  Male who presents with a chief complaint of SBO (2024 07:18)      Factors impacting intake: [ ] none [ ] nausea  [ ] vomiting [ ] diarrhea [ ] constipation  [ ]chewing problems [ ] swallowing issues  [X ] other: S/P SBO, TPN limiting PO intake    Diet Prescription: Diet, Low Fiber (24 @ 11:57) and TPN infusing at 83.3 ml per hour    Intake: fair according to patient report and observation. Patient said he does not feel hungry but eats as much as he can.  Tolerating low fiber diet.     Daily Weight in k.1 (2024 05:00)  Weight in k.5 (2024 07:01)  Weight in k.7 (2024 05:39)  Weight in k.3 (2024 05:49)    % Weight Change No significant changes in weight noted.    Pertinent Medications: MEDICATIONS  (STANDING):  benzocaine/menthol Lozenge 1 Lozenge Oral once  chlorhexidine 2% Cloths 1 Application(s) Topical daily  dextrose 5%. 1000 milliLiter(s) (50 mL/Hr) IV Continuous <Continuous>  dextrose 5%. 1000 milliLiter(s) (100 mL/Hr) IV Continuous <Continuous>  dextrose 50% Injectable 25 Gram(s) IV Push once  dextrose 50% Injectable 12.5 Gram(s) IV Push once  dextrose 50% Injectable 25 Gram(s) IV Push once  diatrizoate meglumine/diatrizoate sodium. 90 milliLiter(s) Oral once  enoxaparin Injectable 40 milliGRAM(s) SubCutaneous every 24 hours  glucagon  Injectable 1 milliGRAM(s) IntraMuscular once  insulin lispro (ADMELOG) corrective regimen sliding scale   SubCutaneous every 6 hours  lipid, fat emulsion (Fish Oil and Plant Based) 20% Infusion 0.7764 Gm/kG/Day (10.4 mL/Hr) IV Continuous <Continuous>  metoclopramide Injectable 10 milliGRAM(s) IV Push every 8 hours  pantoprazole  Injectable 40 milliGRAM(s) IV Push every 24 hours  Parenteral Nutrition - Adult 1 Each (83 mL/Hr) TPN Continuous <Continuous>    MEDICATIONS  (PRN):  dextrose Oral Gel 15 Gram(s) Oral once PRN Blood Glucose LESS THAN 70 milliGRAM(s)/deciliter  hydrALAZINE Injectable 10 milliGRAM(s) IV Push every 6 hours PRN systolic BP > 160  ondansetron Injectable 4 milliGRAM(s) IV Push every 6 hours PRN Nausea  sodium chloride 0.9% lock flush 10 milliLiter(s) IV Push every 1 hour PRN Pre/post blood products, medications, blood draw, and to maintain line patency    Pertinent Labs:  Na141 mmol/L Glu 168 mg/dL[H] K+ 4.2 mmol/L Cr  0.74 mg/dL BUN 15 mg/dL  Phos 3.0 mg/dL  Alb 2.9 g/dL[L]  Chol --    LDL --    HDL --    Trig 305 mg/dL[H]     CAPILLARY BLOOD GLUCOSE    POCT Blood Glucose.: 163 mg/dL (2024 05:10)  POCT Blood Glucose.: 146 mg/dL (2024 23:32)  POCT Blood Glucose.: 198 mg/dL (2024 17:50)  POCT Blood Glucose.: 168 mg/dL (2024 16:24)    Skin: Skin is intact and no edema noted.    Estimated Needs:   [X] no change since previous assessment  [ ] recalculated:     Previous Nutrition Diagnosis:   [ ] Inadequate Energy Intake [X]Inadequate Oral Intake [ ] Excessive Energy Intake   [ ] Underweight [ ] Increased Nutrient Needs [ ] Overweight/Obesity   [ ] Altered GI Function [ ] Unintended Weight Loss [ ] Food & Nutrition Related Knowledge Deficit [ ] Malnutrition     Nutrition Diagnosis is [ X] ongoing  [ ] resolved [ ] not applicable     New Nutrition Diagnosis: [X ] not applicable       Interventions:   Recommend  [ ] Change Diet To:  [ ] Nutrition Supplement  [X] Nutrition Support reduce TPN to 50% volume and rate 40 ml/hr until current feeding completed.  [X] Other: Continue Low fiber diet and consider oral supplement if weight loss occurs or appetite decreases.  Coupons for supplement provided-Low fiber diet instructions provided. Patient understood information provided.  Monitoring and Evaluation: Check:  [X ] PO intake [ x ] Tolerance to diet prescription [ x ] weights [ x ] labs[ x ] follow up per protocol  [ ] other:

## 2024-11-26 NOTE — PROGRESS NOTE ADULT - SUBJECTIVE AND OBJECTIVE BOX
Patient seen and examined at bedside. No acute overnight events. Patient started on low fiber diet, tolerating well without any nausea or vomiting. Remains on TPN. Denies any abdominal pain. Continues to endorses flatus and bowel movements. Ambulating well. Hemodynamically stable and afebrile.    Vital Signs Last 24 Hrs  T(C): 36.7 (25 Nov 2024 20:55), Max: 36.8 (25 Nov 2024 05:26)  T(F): 98.1 (25 Nov 2024 20:55), Max: 98.3 (25 Nov 2024 05:26)  HR: 71 (25 Nov 2024 20:55) (68 - 71)  BP: 121/76 (25 Nov 2024 20:55) (119/75 - 127/73)  BP(mean): --  RR: 18 (25 Nov 2024 20:55) (18 - 18)  SpO2: 98% (25 Nov 2024 20:55) (97% - 98%)    Parameters below as of 25 Nov 2024 20:55  Patient On (Oxygen Delivery Method): room air    I&O's Summary    24 Nov 2024 07:01  -  25 Nov 2024 07:00  --------------------------------------------------------  IN: 300 mL / OUT: 1050 mL / NET: -750 mL      PE  General: well appearing, no acute distress  Resp: Normal respiratory effort  CV: regular rate and rhythm   Abd: soft, nontender, nondistended. Steristrips in place over midline incision. Clean, dry, and intact.   : voiding spontaneously   MSK: FROM in bilateral extremities  Neuro: GCS15, sensation intact bilaterally                          12.0   4.44  )-----------( 222      ( 25 Nov 2024 07:17 )             36.6       11-25    141  |  110[H]  |  15  ----------------------------<  168[H]  4.1   |  28  |  0.73    Ca    8.4      25 Nov 2024 07:17  Phos  3.3     11-25  Mg     2.0     11-25    TPro  6.2  /  Alb  2.8[L]  /  TBili  0.2  /  DBili  x   /  AST  33  /  ALT  48  /  AlkPhos  147[H]  11-25

## 2024-11-26 NOTE — PROGRESS NOTE ADULT - SUBJECTIVE AND OBJECTIVE BOX
[facility] NEPHROLOGY- PROGRESS NOTE    Patient is a 74y Male with h/o gastric ulcer (s/p partial gastrectomy and Bilroth II reconstruction in 1970s), VASILIY, HTN , s/p appendectomy (1980s), s/p hydrocele (1990s) recently admitted to [facility] 10/28-11/3 for SBO s/p Ex-lap, SHANON with small bowel resection on 10/28  presents with n/v and abd pain. Pt a/w recurrent SBO and Bacteremia. . Nutrition consulted for TPN.   Repeat BCX 11/8 NG  s/p EGD 11/13 with +Nasojejunal enteral tube placement  However NJ tube kinked and removed on 11/15.   s/p IR LUE PICC placed on 11/15    Reports improvement in his ability to tolerate oral intake.    Hospital Medications: Medications reviewed.  REVIEW OF SYSTEMS:  CONSTITUTIONAL: No fevers or chills.  RESPIRATORY: No shortness of breath  CARDIOVASCULAR: No chest pain.  GASTROINTESTINAL: No nausea, vomiting, or abdominal pain.    VASCULAR: No bilateral lower extremity edema.     VITALS:  T(F): 98.2 (11-26-24 @ 05:00), Max: 98.2 (11-26-24 @ 05:00)  HR: 64 (11-26-24 @ 05:00)  BP: 130/75 (11-26-24 @ 05:00)  RR: 17 (11-26-24 @ 05:00)  SpO2: 97% (11-26-24 @ 05:00)  Wt(kg): --        PHYSICAL EXAM:  Constitutional: NAD,  Respiratory: CTA b/l  Cardiovascular: S1, S2, RRR  Gastrointestinal: soft, midline staples   +BS  Extremities:  No peripheral edema  Access: Left PICC line- intact      LABS:  11-26    141  |  110[H]  |  15  ----------------------------<  168[H]  4.2   |  25  |  0.74    Ca    8.4      26 Nov 2024 07:05  Phos  3.0     11-26  Mg     2.1     11-26    TPro  6.3  /  Alb  2.9[L]  /  TBili  0.3  /  DBili      /  AST  24  /  ALT  40  /  AlkPhos  141[H]  11-26    Creatinine Trend: 0.74 <--, 0.73 <--, 0.75 <--, 0.80 <--, 0.79 <--, 0.82 <--, 0.84 <--                        12.0   5.94  )-----------( 225      ( 26 Nov 2024 07:05 )             35.8     Urine Studies:  Urinalysis Basic - ( 26 Nov 2024 07:05 )    Color:  / Appearance:  / SG:  / pH:   Gluc: 168 mg/dL / Ketone:   / Bili:  / Urobili:    Blood:  / Protein:  / Nitrite:    Leuk Esterase:  / RBC:  / WBC    Sq Epi:  / Non Sq Epi:  / Bacteria:

## 2024-11-26 NOTE — PROGRESS NOTE ADULT - ASSESSMENT
74M  s/p exlap lysis of adhesions, small bowel resection readmitted with recurrent SBO.  s/p EGD on 11/13; stricture was found which was dilated, feeding tube passed through that portion and subsequently removed 11/16   L PICC placed 11/15    Plan  -Multimodal pain control   -Continue low residue diet  -Continue TPN, per nephro via PICC   -Replete electrolytes PRN  -Daily OOBTC and continue ambulation   -Continue Lovenox for dvt ppx  -Completed course of IV ABx per ID

## 2024-11-26 NOTE — PROGRESS NOTE ADULT - ATTENDING COMMENTS
As in above full notation.  Vitals non-suggestive.  Wounds clean and abdomen soft with appropriate incisional tenderness.  Labs reassuring.  Clinically, improving overall. Tolerate soft diet.   Surgically, stable at present.  Plan for weaning TPN, likely discharge tomorrow.   Reviewed with patient in detail, Surgical team as well as RN's.

## 2024-11-26 NOTE — PROGRESS NOTE ADULT - ASSESSMENT
Patient is a 74y Male with h/o gastric ulcer (s/p partial gastrectomy and Bilroth II reconstruction in 1970s), VASILIY, HTN , s/p appendectomy (1980s), s/p hydrocele (1990s) recently admitted to [facility] 10/28-11/3 for SBO s/p Ex-lap, SHANON with small bowel resection on 10/28  presents with n/v and abd pain. Pt a/w recurrent SBO and Bacteremia. . Nutrition consulted for TPN.   Repeat BCX 11/8 NG  s/p EGD 11/13 with +Nasojejunal enteral tube placement    1. Severe PCM- + wt loss, NPO since 11/6 (pt states he has not eaten even 1 week PTA).  Naso-jejunal tube removed; BCx 11/8- NG. s/p IR PICC placed 11/15. Pt initiated on TPN on 11/16 due to prolonged NPO status.   c/w TPN with lipids @ 2L. Keep off IVF. Wean TPN today; less contents and flow rate. Surgery plans to assess oral intake with minimal TPN assistance.      FS acceptable. c/w FS q6hrs while on TPN. c/w ISS.  hgbA1C 5.9 on 11/14.   c/w lipids. If triglycerides continue to increase, then will adjust lipid content in TPN. LFTs wnl. Check CMP/Mg/Phos/ Ionized calcium in am.     Daily weights. Strict I/O. Hold TPN if pt spikes fever and check BCX x2    2. Hypophosphatemia- resolved with phos in TPN.  Monitor K/Mg/ Phos daily.   3. Bacteremia/ Enteritis- Finished Ceftriaxone/ Flagyl. BCx+ 11/ 6- Bacteroides fragilis. Repeat BCx 11/8 NG. ID following  4. Essential HTN- BP acceptable. Pt on Hydralazine 10mg IV q 6hr prn SBP >160. Monitor BP  5. SBO-Pt had BM today; pending abd xray.  plan as per surgery       TPN orders    Weight 64.4 kg  Total Kcal 1800  Fat 50g = 500 kcal  Protein 78g = 312 kcal  Dextrose 290g = 988 kcal    [facility] NEPHROLOGY  Jose Angel Horner M.D.  Caden Heck D.O.  Amalia Allen, MMD Eugenia Melchor, MSN, ANP-C    Telephone: [phone]  Facsimile: [phone] [place] [address], [id number]  [place] [address]

## 2024-11-27 ENCOUNTER — TRANSCRIPTION ENCOUNTER (OUTPATIENT)
Age: 74
End: 2024-11-27

## 2024-11-27 VITALS
OXYGEN SATURATION: 96 % | TEMPERATURE: 98 F | HEART RATE: 77 BPM | DIASTOLIC BLOOD PRESSURE: 82 MMHG | RESPIRATION RATE: 18 BRPM | SYSTOLIC BLOOD PRESSURE: 143 MMHG

## 2024-11-27 LAB
ALBUMIN SERPL ELPH-MCNC: 2.8 G/DL — LOW (ref 3.5–5)
ALP SERPL-CCNC: 140 U/L — HIGH (ref 40–120)
ALT FLD-CCNC: 37 U/L DA — SIGNIFICANT CHANGE UP (ref 10–60)
ANION GAP SERPL CALC-SCNC: 3 MMOL/L — LOW (ref 5–17)
AST SERPL-CCNC: 22 U/L — SIGNIFICANT CHANGE UP (ref 10–40)
BASOPHILS # BLD AUTO: 0.07 K/UL — SIGNIFICANT CHANGE UP (ref 0–0.2)
BASOPHILS NFR BLD AUTO: 1.1 % — SIGNIFICANT CHANGE UP (ref 0–2)
BILIRUB SERPL-MCNC: 0.3 MG/DL — SIGNIFICANT CHANGE UP (ref 0.2–1.2)
BUN SERPL-MCNC: 14 MG/DL — SIGNIFICANT CHANGE UP (ref 7–18)
CA-I BLD-SCNC: 5.1 MG/DL — SIGNIFICANT CHANGE UP (ref 4.5–5.6)
CALCIUM SERPL-MCNC: 8.6 MG/DL — SIGNIFICANT CHANGE UP (ref 8.4–10.5)
CHLORIDE SERPL-SCNC: 111 MMOL/L — HIGH (ref 96–108)
CO2 SERPL-SCNC: 28 MMOL/L — SIGNIFICANT CHANGE UP (ref 22–31)
CREAT SERPL-MCNC: 0.85 MG/DL — SIGNIFICANT CHANGE UP (ref 0.5–1.3)
EGFR: 91 ML/MIN/1.73M2 — SIGNIFICANT CHANGE UP
EOSINOPHIL # BLD AUTO: 0.27 K/UL — SIGNIFICANT CHANGE UP (ref 0–0.5)
EOSINOPHIL NFR BLD AUTO: 4.4 % — SIGNIFICANT CHANGE UP (ref 0–6)
GLUCOSE BLDC GLUCOMTR-MCNC: 134 MG/DL — HIGH (ref 70–99)
GLUCOSE BLDC GLUCOMTR-MCNC: 191 MG/DL — HIGH (ref 70–99)
GLUCOSE SERPL-MCNC: 151 MG/DL — HIGH (ref 70–99)
HCT VFR BLD CALC: 37.6 % — LOW (ref 39–50)
HGB BLD-MCNC: 12.7 G/DL — LOW (ref 13–17)
IMM GRANULOCYTES NFR BLD AUTO: 4.4 % — HIGH (ref 0–0.9)
LYMPHOCYTES # BLD AUTO: 1.17 K/UL — SIGNIFICANT CHANGE UP (ref 1–3.3)
LYMPHOCYTES # BLD AUTO: 19.2 % — SIGNIFICANT CHANGE UP (ref 13–44)
MAGNESIUM SERPL-MCNC: 2.1 MG/DL — SIGNIFICANT CHANGE UP (ref 1.6–2.6)
MCHC RBC-ENTMCNC: 28.8 PG — SIGNIFICANT CHANGE UP (ref 27–34)
MCHC RBC-ENTMCNC: 33.8 G/DL — SIGNIFICANT CHANGE UP (ref 32–36)
MCV RBC AUTO: 85.3 FL — SIGNIFICANT CHANGE UP (ref 80–100)
MONOCYTES # BLD AUTO: 0.62 K/UL — SIGNIFICANT CHANGE UP (ref 0–0.9)
MONOCYTES NFR BLD AUTO: 10.2 % — SIGNIFICANT CHANGE UP (ref 2–14)
NEUTROPHILS # BLD AUTO: 3.69 K/UL — SIGNIFICANT CHANGE UP (ref 1.8–7.4)
NEUTROPHILS NFR BLD AUTO: 60.7 % — SIGNIFICANT CHANGE UP (ref 43–77)
NRBC # BLD: 0 /100 WBCS — SIGNIFICANT CHANGE UP (ref 0–0)
PHOSPHATE SERPL-MCNC: 3.3 MG/DL — SIGNIFICANT CHANGE UP (ref 2.5–4.5)
PLATELET # BLD AUTO: 227 K/UL — SIGNIFICANT CHANGE UP (ref 150–400)
POTASSIUM SERPL-MCNC: 4.2 MMOL/L — SIGNIFICANT CHANGE UP (ref 3.5–5.3)
POTASSIUM SERPL-SCNC: 4.2 MMOL/L — SIGNIFICANT CHANGE UP (ref 3.5–5.3)
PROT SERPL-MCNC: 6.5 G/DL — SIGNIFICANT CHANGE UP (ref 6–8.3)
RBC # BLD: 4.41 M/UL — SIGNIFICANT CHANGE UP (ref 4.2–5.8)
RBC # FLD: 15.4 % — HIGH (ref 10.3–14.5)
SODIUM SERPL-SCNC: 142 MMOL/L — SIGNIFICANT CHANGE UP (ref 135–145)
TRIGL SERPL-MCNC: 243 MG/DL — HIGH
WBC # BLD: 6.09 K/UL — SIGNIFICANT CHANGE UP (ref 3.8–10.5)
WBC # FLD AUTO: 6.09 K/UL — SIGNIFICANT CHANGE UP (ref 3.8–10.5)

## 2024-11-27 PROCEDURE — 82248 BILIRUBIN DIRECT: CPT

## 2024-11-27 PROCEDURE — 84100 ASSAY OF PHOSPHORUS: CPT

## 2024-11-27 PROCEDURE — 74019 RADEX ABDOMEN 2 VIEWS: CPT

## 2024-11-27 PROCEDURE — 83605 ASSAY OF LACTIC ACID: CPT

## 2024-11-27 PROCEDURE — 82962 GLUCOSE BLOOD TEST: CPT

## 2024-11-27 PROCEDURE — C1726: CPT

## 2024-11-27 PROCEDURE — 74018 RADEX ABDOMEN 1 VIEW: CPT

## 2024-11-27 PROCEDURE — 85730 THROMBOPLASTIN TIME PARTIAL: CPT

## 2024-11-27 PROCEDURE — 85610 PROTHROMBIN TIME: CPT

## 2024-11-27 PROCEDURE — C1751: CPT

## 2024-11-27 PROCEDURE — 74177 CT ABD & PELVIS W/CONTRAST: CPT | Mod: MC

## 2024-11-27 PROCEDURE — 84484 ASSAY OF TROPONIN QUANT: CPT

## 2024-11-27 PROCEDURE — 80048 BASIC METABOLIC PNL TOTAL CA: CPT

## 2024-11-27 PROCEDURE — 82330 ASSAY OF CALCIUM: CPT

## 2024-11-27 PROCEDURE — 86850 RBC ANTIBODY SCREEN: CPT

## 2024-11-27 PROCEDURE — 87150 DNA/RNA AMPLIFIED PROBE: CPT

## 2024-11-27 PROCEDURE — 36415 COLL VENOUS BLD VENIPUNCTURE: CPT

## 2024-11-27 PROCEDURE — 97110 THERAPEUTIC EXERCISES: CPT

## 2024-11-27 PROCEDURE — 80053 COMPREHEN METABOLIC PANEL: CPT

## 2024-11-27 PROCEDURE — C1769: CPT

## 2024-11-27 PROCEDURE — 83690 ASSAY OF LIPASE: CPT

## 2024-11-27 PROCEDURE — 87077 CULTURE AEROBIC IDENTIFY: CPT

## 2024-11-27 PROCEDURE — 86901 BLOOD TYPING SEROLOGIC RH(D): CPT

## 2024-11-27 PROCEDURE — 87040 BLOOD CULTURE FOR BACTERIA: CPT

## 2024-11-27 PROCEDURE — 74250 X-RAY XM SM INT 1CNTRST STD: CPT

## 2024-11-27 PROCEDURE — 85025 COMPLETE CBC W/AUTO DIFF WBC: CPT

## 2024-11-27 PROCEDURE — 36573 INSJ PICC RS&I 5 YR+: CPT

## 2024-11-27 PROCEDURE — 86900 BLOOD TYPING SEROLOGIC ABO: CPT

## 2024-11-27 PROCEDURE — 84478 ASSAY OF TRIGLYCERIDES: CPT

## 2024-11-27 PROCEDURE — 85027 COMPLETE CBC AUTOMATED: CPT

## 2024-11-27 PROCEDURE — 77001 FLUOROGUIDE FOR VEIN DEVICE: CPT

## 2024-11-27 PROCEDURE — 83735 ASSAY OF MAGNESIUM: CPT

## 2024-11-27 PROCEDURE — 99285 EMERGENCY DEPT VISIT HI MDM: CPT

## 2024-11-27 PROCEDURE — 97116 GAIT TRAINING THERAPY: CPT

## 2024-11-27 PROCEDURE — 71045 X-RAY EXAM CHEST 1 VIEW: CPT

## 2024-11-27 PROCEDURE — 76937 US GUIDE VASCULAR ACCESS: CPT

## 2024-11-27 PROCEDURE — 83036 HEMOGLOBIN GLYCOSYLATED A1C: CPT

## 2024-11-27 RX ORDER — ONDANSETRON HYDROCHLORIDE 4 MG/1
1 TABLET, FILM COATED ORAL
Qty: 1 | Refills: 0
Start: 2024-11-27 | End: 2024-11-28

## 2024-11-27 RX ADMIN — METOCLOPRAMIDE HYDROCHLORIDE 10 MILLIGRAM(S): 10 TABLET ORAL at 05:33

## 2024-11-27 RX ADMIN — Medication 1: at 12:03

## 2024-11-27 RX ADMIN — CHLORHEXIDINE GLUCONATE 1 APPLICATION(S): 1.2 RINSE ORAL at 12:03

## 2024-11-27 RX ADMIN — METOCLOPRAMIDE HYDROCHLORIDE 10 MILLIGRAM(S): 10 TABLET ORAL at 17:16

## 2024-11-27 RX ADMIN — PANTOPRAZOLE SODIUM 40 MILLIGRAM(S): 40 TABLET, DELAYED RELEASE ORAL at 12:03

## 2024-11-27 NOTE — DISCHARGE NOTE NURSING/CASE MANAGEMENT/SOCIAL WORK - NSDCVIVACCINE_GEN_ALL_CORE_FT
Influenza, high-dose, trivalent, preservative free; 03-Nov-2024 16:35; Heena Duval (HINA); [employer]; [id number] (Exp. Date: 03-Nov-2024); IntraMuscular; Deltoid Left.; 0.5 milliLiter(s); VIS (VIS Published: 06-Aug-2021, VIS Presented: 03-Nov-2024);

## 2024-11-27 NOTE — CHART NOTE - NSCHARTNOTESELECT_GEN_ALL_CORE
Follow Up/Nutrition Services
Follow Up/Nutrition Services
TF Recommendation/Nutrition Services
TPN Follow Up/Nutrition Services
Follow Up/Nutrition Services
Nutrition Services

## 2024-11-27 NOTE — DISCHARGE NOTE PROVIDER - NSDCMRMEDTOKEN_GEN_ALL_CORE_FT
aluminum hydroxide-magnesium hydroxide 200 mg-200 mg/5 mL oral suspension: 30 milliliter(s) orally every 4 hours, As needed, Dyspepsia  ferrous sulfate 325 mg (65 mg elemental iron) oral tablet: 1 tab(s) orally once a day  Norvasc 5 mg oral tablet: 1 tab(s) orally once a day  polyethylene glycol 3350 oral powder for reconstitution: 17 gram(s) orally once a day, As Needed -for constipation   Protonix 40 mg oral delayed release tablet: 1 tab(s) orally every 12 hours for 13 days then, once a day   senna oral tablet: 2 tab(s) orally once a day (at bedtime)

## 2024-11-27 NOTE — PROGRESS NOTE ADULT - NSPROGADDITIONALINFOA_GEN_ALL_CORE
I have personally seen and examined the patient with surgical team. Agree with the history, physical exam, and plan as documented by the PA.   As in above full notation.  Vitals non-suggestive.  Wounds clean and abdomen soft non tender. Tolerating diet having bowel function.   Labs reassuring.  Clinically, improving overall.  Surgically, stable at present.  Plan for discharge home today.  Reviewed with patient in detail, Surgical team as well as RN's.

## 2024-11-27 NOTE — DISCHARGE NOTE PROVIDER - NSDCFUADDINST_GEN_ALL_CORE_FT
Please adhere to the following instructions.   -You make take over the counter pain medications. You may alternate Tylenol 650mg and Motrin 400mg every 6 hours as needed. Please do not exceed more than 4000mg of Tylenol and 1200mg of Motrin in 24 hours.   -There are small bandages covering the incision site known as as Steristrips. Keep those in place, they will fall off on their own. You may shower regularly at home, let soap and water rinse of the incisions and pat dry. DO NOT bathe, swim, or soak the incision for at least 2 weeks.   -You may have a lot fiber diet at home.   -Please continue to walk at home daily.   -Please do not lift anything heavier than 15 pounds for the next 4-6 weeks. You may advance activity as tolerated at home.   -Please follow up with your primary care provider within 2 weeks of discharge.   -Please call to schedule an appointment to follow up with Dr. Mcgovern within 2 weeks of discharge.

## 2024-11-27 NOTE — PROGRESS NOTE ADULT - PROVIDER SPECIALTY LIST ADULT
Infectious Disease
Infectious Disease
Nutrition Support
Surgery
Infectious Disease
Nutrition Support
Surgery
Infectious Disease
Nutrition Support
Surgery

## 2024-11-27 NOTE — DISCHARGE NOTE NURSING/CASE MANAGEMENT/SOCIAL WORK - PATIENT PORTAL LINK FT
You can access the FollowMyHealth Patient Portal offered by [facility] by registering at the following website: [email or web address]. By joining [facility]’s FollowMyHealth portal, you will also be able to view your health information using other applications (apps) compatible with our system.

## 2024-11-27 NOTE — PROGRESS NOTE ADULT - NUTRITIONAL ASSESSMENT
This patient has been assessed with a concern for Malnutrition and has been determined to have a diagnosis/diagnoses of Severe protein-calorie malnutrition.    This patient is being managed with:   Diet NPO-  Entered: Nov 6 2024  3:17PM    The following pending diet order is being considered for treatment of Severe protein-calorie malnutrition:  Diet NPO with Tube Feed-  Tube Feeding Modality: Nasojejunal  Vital AF 1.2 Mauri  Total Volume for 24 Hours (mL): 1440  Continuous  Starting Tube Feed Rate {mL per Hour}: 10  Increase Tube Feed Rate by (mL): 10     Every 4 hours  Until Goal Tube Feed Rate (mL per Hour): 60  Tube Feed Duration (in Hours): 24  Tube Feed Start Time: 13:00  Free Water Flush Instructions:  1168 mL  Entered: Nov 14 2024 12:48PM    Diet NPO with Tube Feed-  Tube Feeding Modality: Nasojejunal  Vital AF 1.2 Mauri  Total Volume for 24 Hours (mL): 1440  Continuous  Starting Tube Feed Rate {mL per Hour}: 10  Increase Tube Feed Rate by (mL): 10     Every 4 hours  Until Goal Tube Feed Rate (mL per Hour): 60  Tube Feed Duration (in Hours): 24  Tube Feed Start Time: 13:00  Free Water Flush Instructions:  1103 mL  Entered: Nov 14 2024 12:43PM    Diet NPO with Tube Feed-  Tube Feeding Modality: Nasogastric  Vital AF 1.2 Mauri  Total Volume for 24 Hours (mL): 1360  Continuous  Starting Tube Feed Rate {mL per Hour}: 10  Increase Tube Feed Rate by (mL): 10     Every 4 hours  Until Goal Tube Feed Rate (mL per Hour): 85  Tube Feed Duration (in Hours): 16  Tube Feed Start Time: 12:00  Tube Feed Stop Time: 06:00  Free Water Flush Instructions:  1103 mL  Entered: Nov 14 2024 11:21AM  
This patient has been assessed with a concern for Malnutrition and has been determined to have a diagnosis/diagnoses of Severe protein-calorie malnutrition.    This patient is being managed with:   lipid fat emulsion (Fish Oil and Plant Based) 20% Infusion-[Known as SMOFLIPID 20% Infusion]  50 Gram(s) in IV Solution 250 milliLiter(s) infuse at 10.4 mL/Hr  Dose Rate: 0.7764 Gm/kG/Day Infuse Over: 24 Hours; Stop After 24 Hours  Administration Instructions: Use 1.2 micron in-line filter  Entered: Nov 17 2024 10:00PM    Parenteral Nutrition - Adult-  Entered: Nov 17 2024 10:00PM    Diet NPO-  Entered: Nov 6 2024  3:17PM    The following pending diet order is being considered for treatment of Severe protein-calorie malnutrition:  Diet NPO with Tube Feed-  Tube Feeding Modality: Nasojejunal  Vital AF 1.2 Mauri  Total Volume for 24 Hours (mL): 1440  Continuous  Starting Tube Feed Rate {mL per Hour}: 10  Increase Tube Feed Rate by (mL): 10     Every 4 hours  Until Goal Tube Feed Rate (mL per Hour): 60  Tube Feed Duration (in Hours): 24  Tube Feed Start Time: 13:00  Free Water Flush Instructions:  1168 mL  Entered: Nov 14 2024 12:48PM    Diet NPO with Tube Feed-  Tube Feeding Modality: Nasojejunal  Vital AF 1.2 Mauri  Total Volume for 24 Hours (mL): 1440  Continuous  Starting Tube Feed Rate {mL per Hour}: 10  Increase Tube Feed Rate by (mL): 10     Every 4 hours  Until Goal Tube Feed Rate (mL per Hour): 60  Tube Feed Duration (in Hours): 24  Tube Feed Start Time: 13:00  Free Water Flush Instructions:  1103 mL  Entered: Nov 14 2024 12:43PM    Diet NPO with Tube Feed-  Tube Feeding Modality: Nasogastric  Vital AF 1.2 Mauri  Total Volume for 24 Hours (mL): 1360  Continuous  Starting Tube Feed Rate {mL per Hour}: 10  Increase Tube Feed Rate by (mL): 10     Every 4 hours  Until Goal Tube Feed Rate (mL per Hour): 85  Tube Feed Duration (in Hours): 16  Tube Feed Start Time: 12:00  Tube Feed Stop Time: 06:00  Free Water Flush Instructions:  1103 mL  Entered: Nov 14 2024 11:21AM  
This patient has been assessed with a concern for Malnutrition and has been determined to have a diagnosis/diagnoses of Severe protein-calorie malnutrition.    This patient is being managed with:   lipid fat emulsion (Fish Oil and Plant Based) 20% Infusion-[Known as SMOFLIPID 20% Infusion]  50 Gram(s) in IV Solution 250 milliLiter(s) infuse at 10.4 mL/Hr  Dose Rate: 0.7764 Gm/kG/Day Infuse Over: 24 Hours; Stop After 24 Hours  Administration Instructions: Use 1.2 micron in-line filter  Entered: Nov 17 2024 10:00PM    Parenteral Nutrition - Adult-  Entered: Nov 17 2024 10:00PM    lipid fat emulsion (Fish Oil and Plant Based) 20% Infusion-[Known as SMOFLIPID 20% Infusion]  25 Gram(s) in IV Solution 125 milliLiter(s) infuse at 5.21 mL/Hr  Dose Rate: 0.3882 Gm/kG/Day Infuse Over: 24 Hours; Stop After 24 Hours  Administration Instructions: Use 1.2 micron in-line filter  Entered: Nov 16 2024 10:00PM    Parenteral Nutrition - Adult-  Entered: Nov 16 2024 10:00PM    Diet NPO-  Entered: Nov 6 2024  3:17PM    The following pending diet order is being considered for treatment of Severe protein-calorie malnutrition:  Diet NPO with Tube Feed-  Tube Feeding Modality: Nasojejunal  Vital AF 1.2 Mauri  Total Volume for 24 Hours (mL): 1440  Continuous  Starting Tube Feed Rate {mL per Hour}: 10  Increase Tube Feed Rate by (mL): 10     Every 4 hours  Until Goal Tube Feed Rate (mL per Hour): 60  Tube Feed Duration (in Hours): 24  Tube Feed Start Time: 13:00  Free Water Flush Instructions:  1168 mL  Entered: Nov 14 2024 12:48PM    Diet NPO with Tube Feed-  Tube Feeding Modality: Nasojejunal  Vital AF 1.2 Mauri  Total Volume for 24 Hours (mL): 1440  Continuous  Starting Tube Feed Rate {mL per Hour}: 10  Increase Tube Feed Rate by (mL): 10     Every 4 hours  Until Goal Tube Feed Rate (mL per Hour): 60  Tube Feed Duration (in Hours): 24  Tube Feed Start Time: 13:00  Free Water Flush Instructions:  1103 mL  Entered: Nov 14 2024 12:43PM    Diet NPO with Tube Feed-  Tube Feeding Modality: Nasogastric  Vital AF 1.2 Mauri  Total Volume for 24 Hours (mL): 1360  Continuous  Starting Tube Feed Rate {mL per Hour}: 10  Increase Tube Feed Rate by (mL): 10     Every 4 hours  Until Goal Tube Feed Rate (mL per Hour): 85  Tube Feed Duration (in Hours): 16  Tube Feed Start Time: 12:00  Tube Feed Stop Time: 06:00  Free Water Flush Instructions:  1103 mL  Entered: Nov 14 2024 11:21AM  
This patient has been assessed with a concern for Malnutrition and has been determined to have a diagnosis/diagnoses of Severe protein-calorie malnutrition.    This patient is being managed with:   lipid fat emulsion (Fish Oil and Plant Based) 20% Infusion-[Known as SMOFLIPID 20% Infusion]  50 Gram(s) in IV Solution 250 milliLiter(s) infuse at 10.4 mL/Hr  Dose Rate: 0.7764 Gm/kG/Day Infuse Over: 24 Hours; Stop After 24 Hours  Administration Instructions: Use 1.2 micron in-line filter  Entered: Nov 19 2024 10:00PM    Parenteral Nutrition - Adult-  Entered: Nov 19 2024 10:00PM    lipid fat emulsion (Fish Oil and Plant Based) 20% Infusion-[Known as SMOFLIPID 20% Infusion]  50 Gram(s) in IV Solution 250 milliLiter(s) infuse at 10.4 mL/Hr  Dose Rate: 0.7764 Gm/kG/Day Infuse Over: 24 Hours; Stop After 24 Hours  Administration Instructions: Use 1.2 micron in-line filter  Entered: Nov 18 2024 10:00PM    Parenteral Nutrition - Adult-  Entered: Nov 18 2024 10:00PM    Diet NPO-  Entered: Nov 6 2024  3:17PM    The following pending diet order is being considered for treatment of Severe protein-calorie malnutrition:  Diet NPO with Tube Feed-  Tube Feeding Modality: Nasojejunal  Vital AF 1.2 Mauri  Total Volume for 24 Hours (mL): 1440  Continuous  Starting Tube Feed Rate {mL per Hour}: 10  Increase Tube Feed Rate by (mL): 10     Every 4 hours  Until Goal Tube Feed Rate (mL per Hour): 60  Tube Feed Duration (in Hours): 24  Tube Feed Start Time: 13:00  Free Water Flush Instructions:  1168 mL  Entered: Nov 14 2024 12:48PM    Diet NPO with Tube Feed-  Tube Feeding Modality: Nasojejunal  Vital AF 1.2 Mauri  Total Volume for 24 Hours (mL): 1440  Continuous  Starting Tube Feed Rate {mL per Hour}: 10  Increase Tube Feed Rate by (mL): 10     Every 4 hours  Until Goal Tube Feed Rate (mL per Hour): 60  Tube Feed Duration (in Hours): 24  Tube Feed Start Time: 13:00  Free Water Flush Instructions:  1103 mL  Entered: Nov 14 2024 12:43PM    Diet NPO with Tube Feed-  Tube Feeding Modality: Nasogastric  Vital AF 1.2 Mauri  Total Volume for 24 Hours (mL): 1360  Continuous  Starting Tube Feed Rate {mL per Hour}: 10  Increase Tube Feed Rate by (mL): 10     Every 4 hours  Until Goal Tube Feed Rate (mL per Hour): 85  Tube Feed Duration (in Hours): 16  Tube Feed Start Time: 12:00  Tube Feed Stop Time: 06:00  Free Water Flush Instructions:  1103 mL  Entered: Nov 14 2024 11:21AM  
This patient has been assessed with a concern for Malnutrition and has been determined to have a diagnosis/diagnoses of Severe protein-calorie malnutrition.    This patient is being managed with:   lipid fat emulsion (Fish Oil and Plant Based) 20% Infusion-[Known as SMOFLIPID 20% Infusion]  50 Gram(s) in IV Solution 250 milliLiter(s) infuse at 10.4 mL/Hr  Dose Rate: 0.7764 Gm/kG/Day Infuse Over: 24 Hours; Stop After 24 Hours  Administration Instructions: Use 1.2 micron in-line filter  Entered: Nov 18 2024 10:00PM    Parenteral Nutrition - Adult-  Entered: Nov 18 2024 10:00PM    Diet NPO-  Entered: Nov 6 2024  3:17PM    The following pending diet order is being considered for treatment of Severe protein-calorie malnutrition:  Diet NPO with Tube Feed-  Tube Feeding Modality: Nasojejunal  Vital AF 1.2 Mauri  Total Volume for 24 Hours (mL): 1440  Continuous  Starting Tube Feed Rate {mL per Hour}: 10  Increase Tube Feed Rate by (mL): 10     Every 4 hours  Until Goal Tube Feed Rate (mL per Hour): 60  Tube Feed Duration (in Hours): 24  Tube Feed Start Time: 13:00  Free Water Flush Instructions:  1168 mL  Entered: Nov 14 2024 12:48PM    Diet NPO with Tube Feed-  Tube Feeding Modality: Nasojejunal  Vital AF 1.2 Mauri  Total Volume for 24 Hours (mL): 1440  Continuous  Starting Tube Feed Rate {mL per Hour}: 10  Increase Tube Feed Rate by (mL): 10     Every 4 hours  Until Goal Tube Feed Rate (mL per Hour): 60  Tube Feed Duration (in Hours): 24  Tube Feed Start Time: 13:00  Free Water Flush Instructions:  1103 mL  Entered: Nov 14 2024 12:43PM    Diet NPO with Tube Feed-  Tube Feeding Modality: Nasogastric  Vital AF 1.2 Mauri  Total Volume for 24 Hours (mL): 1360  Continuous  Starting Tube Feed Rate {mL per Hour}: 10  Increase Tube Feed Rate by (mL): 10     Every 4 hours  Until Goal Tube Feed Rate (mL per Hour): 85  Tube Feed Duration (in Hours): 16  Tube Feed Start Time: 12:00  Tube Feed Stop Time: 06:00  Free Water Flush Instructions:  1103 mL  Entered: Nov 14 2024 11:21AM  
This patient has been assessed with a concern for Malnutrition and has been determined to have a diagnosis/diagnoses of Severe protein-calorie malnutrition.    This patient is being managed with:   lipid fat emulsion (Fish Oil and Plant Based) 20% Infusion-[Known as SMOFLIPID 20% Infusion]  50 Gram(s) in IV Solution 250 milliLiter(s) infuse at 10.4 mL/Hr  Dose Rate: 0.7764 Gm/kG/Day Infuse Over: 24 Hours; Stop After 24 Hours  Administration Instructions: Use 1.2 micron in-line filter  Entered: Nov 24 2024 10:00PM    Parenteral Nutrition - Adult-  Entered: Nov 24 2024 10:00PM    lipid fat emulsion (Fish Oil and Plant Based) 20% Infusion-[Known as SMOFLIPID 20% Infusion]  50 Gram(s) in IV Solution 250 milliLiter(s) infuse at 10.4 mL/Hr  Dose Rate: 0.7764 Gm/kG/Day Infuse Over: 24 Hours; Stop After 24 Hours  Administration Instructions: Use 1.2 micron in-line filter  Entered: Nov 23 2024 10:00PM    Parenteral Nutrition - Adult-  Entered: Nov 23 2024 10:00PM    Diet Clear Liquid-  Entered: Nov 23 2024  3:38PM  
This patient has been assessed with a concern for Malnutrition and has been determined to have a diagnosis/diagnoses of Severe protein-calorie malnutrition.    This patient is being managed with:   lipid fat emulsion (Fish Oil and Plant Based) 20% Infusion-[Known as SMOFLIPID 20% Infusion]  50 Gram(s) in IV Solution 250 milliLiter(s) infuse at 10.4 mL/Hr  Dose Rate: 0.7764 Gm/kG/Day Infuse Over: 24 Hours; Stop After 24 Hours  Administration Instructions: Use 1.2 micron in-line filter  Entered: Nov 20 2024 10:00PM    Parenteral Nutrition - Adult-  Entered: Nov 20 2024 10:00PM    Diet NPO-  Entered: Nov 6 2024  3:17PM    The following pending diet order is being considered for treatment of Severe protein-calorie malnutrition:  Diet NPO with Tube Feed-  Tube Feeding Modality: Nasojejunal  Vital AF 1.2 Mauri  Total Volume for 24 Hours (mL): 1440  Continuous  Starting Tube Feed Rate {mL per Hour}: 10  Increase Tube Feed Rate by (mL): 10     Every 4 hours  Until Goal Tube Feed Rate (mL per Hour): 60  Tube Feed Duration (in Hours): 24  Tube Feed Start Time: 13:00  Free Water Flush Instructions:  1168 mL  Entered: Nov 14 2024 12:48PM    Diet NPO with Tube Feed-  Tube Feeding Modality: Nasojejunal  Vital AF 1.2 Mauri  Total Volume for 24 Hours (mL): 1440  Continuous  Starting Tube Feed Rate {mL per Hour}: 10  Increase Tube Feed Rate by (mL): 10     Every 4 hours  Until Goal Tube Feed Rate (mL per Hour): 60  Tube Feed Duration (in Hours): 24  Tube Feed Start Time: 13:00  Free Water Flush Instructions:  1103 mL  Entered: Nov 14 2024 12:43PM    Diet NPO with Tube Feed-  Tube Feeding Modality: Nasogastric  Vital AF 1.2 Mauri  Total Volume for 24 Hours (mL): 1360  Continuous  Starting Tube Feed Rate {mL per Hour}: 10  Increase Tube Feed Rate by (mL): 10     Every 4 hours  Until Goal Tube Feed Rate (mL per Hour): 85  Tube Feed Duration (in Hours): 16  Tube Feed Start Time: 12:00  Tube Feed Stop Time: 06:00  Free Water Flush Instructions:  1103 mL  Entered: Nov 14 2024 11:21AM  
This patient has been assessed with a concern for Malnutrition and has been determined to have a diagnosis/diagnoses of Severe protein-calorie malnutrition.    This patient is being managed with:   lipid fat emulsion (Fish Oil and Plant Based) 20% Infusion-[Known as SMOFLIPID 20% Infusion]  25.76 Gram(s) in IV Solution 128.8 milliLiter(s) infuse at 5.37 mL/Hr  Dose Rate: 0.4 Gm/kG/Day Infuse Over: 24 Hours; Stop After 24 Hours  Administration Instructions: Use 1.2 micron in-line filter  Entered: Nov 26 2024 10:00PM    Parenteral Nutrition - Adult-  Entered: Nov 26 2024 10:00PM    Diet Low Fiber-  Entered: Nov 25 2024 11:57AM  
This patient has been assessed with a concern for Malnutrition and has been determined to have a diagnosis/diagnoses of Severe protein-calorie malnutrition.    This patient is being managed with:   lipid fat emulsion (Fish Oil and Plant Based) 20% Infusion-[Known as SMOFLIPID 20% Infusion]  50 Gram(s) in IV Solution 250 milliLiter(s) infuse at 10.4 mL/Hr  Dose Rate: 0.7764 Gm/kG/Day Infuse Over: 24 Hours; Stop After 24 Hours  Administration Instructions: Use 1.2 micron in-line filter  Entered: Nov 20 2024 10:00PM    Parenteral Nutrition - Adult-  Entered: Nov 20 2024 10:00PM    lipid fat emulsion (Fish Oil and Plant Based) 20% Infusion-[Known as SMOFLIPID 20% Infusion]  50 Gram(s) in IV Solution 250 milliLiter(s) infuse at 10.4 mL/Hr  Dose Rate: 0.7764 Gm/kG/Day Infuse Over: 24 Hours; Stop After 24 Hours  Administration Instructions: Use 1.2 micron in-line filter  Entered: Nov 19 2024 10:00PM    Parenteral Nutrition - Adult-  Entered: Nov 19 2024 10:00PM    Diet NPO-  Entered: Nov 6 2024  3:17PM    The following pending diet order is being considered for treatment of Severe protein-calorie malnutrition:  Diet NPO with Tube Feed-  Tube Feeding Modality: Nasojejunal  Vital AF 1.2 Mauri  Total Volume for 24 Hours (mL): 1440  Continuous  Starting Tube Feed Rate {mL per Hour}: 10  Increase Tube Feed Rate by (mL): 10     Every 4 hours  Until Goal Tube Feed Rate (mL per Hour): 60  Tube Feed Duration (in Hours): 24  Tube Feed Start Time: 13:00  Free Water Flush Instructions:  1168 mL  Entered: Nov 14 2024 12:48PM    Diet NPO with Tube Feed-  Tube Feeding Modality: Nasojejunal  Vital AF 1.2 Mauri  Total Volume for 24 Hours (mL): 1440  Continuous  Starting Tube Feed Rate {mL per Hour}: 10  Increase Tube Feed Rate by (mL): 10     Every 4 hours  Until Goal Tube Feed Rate (mL per Hour): 60  Tube Feed Duration (in Hours): 24  Tube Feed Start Time: 13:00  Free Water Flush Instructions:  1103 mL  Entered: Nov 14 2024 12:43PM    Diet NPO with Tube Feed-  Tube Feeding Modality: Nasogastric  Vital AF 1.2 Mauri  Total Volume for 24 Hours (mL): 1360  Continuous  Starting Tube Feed Rate {mL per Hour}: 10  Increase Tube Feed Rate by (mL): 10     Every 4 hours  Until Goal Tube Feed Rate (mL per Hour): 85  Tube Feed Duration (in Hours): 16  Tube Feed Start Time: 12:00  Tube Feed Stop Time: 06:00  Free Water Flush Instructions:  1103 mL  Entered: Nov 14 2024 11:21AM  
This patient has been assessed with a concern for Malnutrition and has been determined to have a diagnosis/diagnoses of Severe protein-calorie malnutrition.    This patient is being managed with:   lipid fat emulsion (Fish Oil and Plant Based) 20% Infusion-[Known as SMOFLIPID 20% Infusion]  50 Gram(s) in IV Solution 250 milliLiter(s) infuse at 10.4 mL/Hr  Dose Rate: 0.7764 Gm/kG/Day Infuse Over: 24 Hours; Stop After 24 Hours  Administration Instructions: Use 1.2 micron in-line filter  Entered: Nov 25 2024 10:00PM    Parenteral Nutrition - Adult-  Entered: Nov 25 2024 10:00PM    Diet Low Fiber-  Entered: Nov 25 2024 11:57AM

## 2024-11-27 NOTE — PROGRESS NOTE ADULT - ASSESSMENT
74M  s/p exlap lysis of adhesions, small bowel resection readmitted with recurrent SBO.  s/p EGD on 11/13; stricture was found which was dilated, feeding tube passed through that portion and subsequently removed 11/16   L PICC placed 11/15    Plan  -Multimodal pain control   -Continue low residue diet  -Continue TPN, per nephro via PICC, f/u for weaning off TPN pending discharge   -Replete electrolytes PRN  -Daily OOBTC and continue ambulation   -Continue Lovenox for dvt ppx  -Completed course of IV ABx per ID  -Anticipate discharge today

## 2024-11-27 NOTE — DISCHARGE NOTE PROVIDER - CARE PROVIDER_API CALL
Mariely Cummings  Surgery  [address], [address]  [place] [address]  Phone: [phone]  Fax: [phone]  Established Patient  Follow Up Time: 2 weeks

## 2024-11-27 NOTE — DISCHARGE NOTE PROVIDER - HOSPITAL COURSE
74-year-old male with extensive past surgical history presented to ED with nausea and vomiting which started last night.  This patient recently discharged from [facility] after exploratory laparotomy, lysis of adhesions, small bowel resection for closed loop bowel obstruction.  Discharged 11/3.  He states he did not have bowel movement and flatus in the last two days . His appetite was low yesterday and eventually started vomiting last night. He denies fever, chills, SOB. Wound is dry, clean, intact. Labs showed elevated WBC. Vitals WNL. His abdomen is distended with mild tenderness on RLQ. CT scan today showing proximal small bowel obstruction.   PMH/PSH: PMH gastric ulcer (s/p partial gastrectomy and Bilroth II reconstruction in 1970s), VASILIY, HTN , PSH s/p appendectomy (1980s), s/p hydrocele (1990s).   EGD was completed on this admission showing esophageal stricture which was dilated on 11/13. Patient continued with SBO during hospital course. NJ tube was initiated placed however nonfunctioning, and NGT was placed. NGT remained in place to wall suction with daily clamp trials while monitoring for return of bowel function. At this time patient received TPN for nutrition. Subsequent clamp trials with minimal residual output to less than 100cc. At this time patient reported 1-2 flatus, no bowel movement. Gastrograffin study was performed and patient had multiple bowel movements following. Contrast was shown on imaging to progress through small and large bowel. At this time patient continued to pass flatus with multiple loose bowel movements. Patient was advanced to clear liquid diet tolerated well without any nausea, vomiting, or worsening abdominal pain. Patient was then advanced to full liquid diet for which he tolerated. Patient now on low residue diet, tolerating for multiple days, continues to have bowel function. TPN rate weaned to completed. Patient is ambulating independently, denies any pain, has no acute complaints, and remains hemodynamically stable and afebrile. Appropriate for discharge at this time.

## 2024-11-27 NOTE — DISCHARGE NOTE NURSING/CASE MANAGEMENT/SOCIAL WORK - NSDCPEFALRISK_GEN_ALL_CORE
For information on Fall & Injury Prevention, visit: [email or web address] OR  [email or web address] OR  https://www.cdc.gov/steadi/patient.html

## 2024-11-27 NOTE — PROGRESS NOTE ADULT - ASSESSMENT
Patient is a 74y Male with h/o gastric ulcer (s/p partial gastrectomy and Bilroth II reconstruction in 1970s), VASILIY, HTN , s/p appendectomy (1980s), s/p hydrocele (1990s) recently admitted to [facility] 10/28-11/3 for SBO s/p Ex-lap, SHANON with small bowel resection on 10/28  presents with n/v and abd pain. Pt a/w recurrent SBO and Bacteremia. . Nutrition consulted for TPN.   Repeat BCX 11/8 NG  s/p EGD 11/13 with +Nasojejunal enteral tube placement    1. Severe PCM- + wt loss, NPO since 11/6 (pt states he has not eaten even 1 week PTA).  Naso-jejunal tube removed; BCx 11/8- NG. s/p IR PICC placed 11/15. Pt initiated on TPN on 11/16 due to prolonged NPO status.   c/w TPN with lipids @ 2L. Keep off IVF. If patient is planned for discharge today, then discontinue TPN. Flow rate and contents were decreased on 11/26.      FS acceptable. c/w FS q6hrs while on TPN. c/w ISS.  hgbA1C 5.9 on 11/14.   c/w lipids. If triglycerides continue to increase, then will adjust lipid content in TPN. LFTs wnl. Check CMP/Mg/Phos/ Ionized calcium in am.     Daily weights. Strict I/O. Hold TPN if pt spikes fever and check BCX x2    2. Hypophosphatemia- resolved with phos in TPN.  Monitor K/Mg/ Phos daily.   3. Bacteremia/ Enteritis- Finished Ceftriaxone/ Flagyl. BCx+ 11/ 6- Bacteroides fragilis. Repeat BCx 11/8 NG. ID following  4. Essential HTN- BP acceptable. Pt on Hydralazine 10mg IV q 6hr prn SBP >160. Monitor BP  5. SBO-Pt had BM today; pending abd xray.  plan as per surgery       TPN orders    Weight 64.4 kg  Total Kcal 1800  Fat 50g = 500 kcal  Protein 78g = 312 kcal  Dextrose 290g = 988 kcal    [facility] NEPHROLOGY  Jose Angel Horner M.D.  Caden Heck D.O.  Amalia Allen M.D.  MD Eugenia Cazares, MSN, ANP-C    Telephone: [phone]  Facsimile: [phone]    [facility] [place] [address], [address]  [place] [id number]

## 2024-11-27 NOTE — PROGRESS NOTE ADULT - SUBJECTIVE AND OBJECTIVE BOX
Patient seen and examined at bedside. Patient with no acute overnight events. No acute complaints. Patient continues to tolerated regular diet. Denies any nausea or vomiting. Continues to report bowel function. Ambulating well. Hemodynamically stable and afebrile. Laboratory studies within normal limits.     Vital Signs Last 24 Hrs  T(C): 36.6 (27 Nov 2024 05:15), Max: 36.8 (26 Nov 2024 21:02)  T(F): 97.9 (27 Nov 2024 05:15), Max: 98.2 (26 Nov 2024 21:02)  HR: 66 (27 Nov 2024 05:15) (66 - 76)  BP: 145/76 (27 Nov 2024 05:15) (121/76 - 145/76)  BP(mean): --  RR: 17 (27 Nov 2024 05:15) (17 - 18)  SpO2: 97% (27 Nov 2024 05:15) (97% - 98%)    Parameters below as of 27 Nov 2024 05:15  Patient On (Oxygen Delivery Method): room air    PE  General: well appearing, no acute distress  Resp: Normal respiratory effort  CV: regular rate and rhythm   Abd: soft, nontender, nondistended. Steristrips in place over midline incision. Clean, dry, and intact.   : voiding spontaneously   MSK: FROM in bilateral extremities  Neuro: GCS15, sensation intact bilaterally                          12.7   6.09  )-----------( 227      ( 27 Nov 2024 06:30 )             37.6   11-27    142  |  111[H]  |  14  ----------------------------<  151[H]  4.2   |  28  |  0.85    Ca    8.6      27 Nov 2024 06:30  Phos  3.3     11-27  Mg     2.1     11-27    TPro  6.5  /  Alb  2.8[L]  /  TBili  0.3  /  DBili  x   /  AST  22  /  ALT  37  /  AlkPhos  140[H]  11-27

## 2024-11-27 NOTE — PROGRESS NOTE ADULT - SUBJECTIVE AND OBJECTIVE BOX
[facility] NEPHROLOGY- PROGRESS NOTE    Patient is a 74y Male with h/o gastric ulcer (s/p partial gastrectomy and Bilroth II reconstruction in 1970s), VASILIY, HTN , s/p appendectomy (1980s), s/p hydrocele (1990s) recently admitted to [facility] 10/28-11/3 for SBO s/p Ex-lap, SHANON with small bowel resection on 10/28  presents with n/v and abd pain. Pt a/w recurrent SBO and Bacteremia. . Nutrition consulted for TPN.   Repeat BCX 11/8 NG  s/p EGD 11/13 with +Nasojejunal enteral tube placement  However NJ tube kinked and removed on 11/15.   s/p IR LUE PICC placed on 11/15    Reports improvement in his ability to tolerate oral intake.    Hospital Medications: Medications reviewed.  REVIEW OF SYSTEMS:  CONSTITUTIONAL: No fevers or chills.  RESPIRATORY: No shortness of breath  CARDIOVASCULAR: No chest pain.  GASTROINTESTINAL: No nausea, vomiting, or abdominal pain.    VASCULAR: No bilateral lower extremity edema.     VITALS:  T(F): 97.9 (11-27-24 @ 05:15), Max: 98.2 (11-26-24 @ 21:02)  HR: 66 (11-27-24 @ 05:15)  BP: 145/76 (11-27-24 @ 05:15)  RR: 17 (11-27-24 @ 05:15)  SpO2: 97% (11-27-24 @ 05:15)  Wt(kg): --      PHYSICAL EXAM:  Constitutional: NAD,  Respiratory: CTA b/l  Cardiovascular: S1, S2, RRR  Gastrointestinal: soft, midline staples   +BS  Extremities:  No peripheral edema  Access: Left PICC line- intact      LABS:  11-27    142  |  111[H]  |  14  ----------------------------<  151[H]  4.2   |  28  |  0.85    Ca    8.6      27 Nov 2024 06:30  Phos  3.3     11-27  Mg     2.1     11-27    TPro  6.5  /  Alb  2.8[L]  /  TBili  0.3  /  DBili      /  AST  22  /  ALT  37  /  AlkPhos  140[H]  11-27    Creatinine Trend: 0.85 <--, 0.74 <--, 0.73 <--, 0.75 <--, 0.80 <--, 0.79 <--, 0.82 <--                        12.7   6.09  )-----------( 227      ( 27 Nov 2024 06:30 )             37.6     Urine Studies:  Urinalysis Basic - ( 27 Nov 2024 06:30 )    Color:  / Appearance:  / SG:  / pH:   Gluc: 151 mg/dL / Ketone:   / Bili:  / Urobili:    Blood:  / Protein:  / Nitrite:    Leuk Esterase:  / RBC:  / WBC    Sq Epi:  / Non Sq Epi:  / Bacteria:

## 2024-11-27 NOTE — CHART NOTE - NSCHARTNOTEFT_GEN_A_CORE
Pt seen for Follow Up    Spoke to pt at bedside, pt tolerating low fiber diet at this time, continue to monitor. No recent episodes of nausea, vomiting, diarrhea or constipation per pt. No abd pain. Last BM noted on 11/26 per pt. Denies any chewing/swallowing difficulties. Intake is 25-75% per pt/tray observation. Pt states he is unable to eat too much at this time. Food preferences explored and forwarded to dietary. Noted triglycerides (243) - elevated, POCT between 131-190, nutrition support doctor aware. TPN may be discontinued today, pt may be discharged today per interdisciplinary rounds.    Diet Prescription: Diet, Low Fiber (11-25-24 @ 11:57)    Pertinent Medications: MEDICATIONS  (STANDING):  benzocaine/menthol Lozenge 1 Lozenge Oral once  chlorhexidine 2% Cloths 1 Application(s) Topical daily  dextrose 5%. 1000 milliLiter(s) (50 mL/Hr) IV Continuous <Continuous>  dextrose 5%. 1000 milliLiter(s) (100 mL/Hr) IV Continuous <Continuous>  dextrose 50% Injectable 25 Gram(s) IV Push once  dextrose 50% Injectable 12.5 Gram(s) IV Push once  dextrose 50% Injectable 25 Gram(s) IV Push once  diatrizoate meglumine/diatrizoate sodium. 90 milliLiter(s) Oral once  enoxaparin Injectable 40 milliGRAM(s) SubCutaneous every 24 hours  glucagon  Injectable 1 milliGRAM(s) IntraMuscular once  insulin lispro (ADMELOG) corrective regimen sliding scale   SubCutaneous every 6 hours  lipid, fat emulsion (Fish Oil and Plant Based) 20% Infusion 0.4 Gm/kG/Day (5.37 mL/Hr) IV Continuous <Continuous>  metoclopramide Injectable 10 milliGRAM(s) IV Push every 8 hours  pantoprazole  Injectable 40 milliGRAM(s) IV Push every 24 hours  Parenteral Nutrition - Adult 1 Each (83 mL/Hr) TPN Continuous <Continuous>    MEDICATIONS  (PRN):  dextrose Oral Gel 15 Gram(s) Oral once PRN Blood Glucose LESS THAN 70 milliGRAM(s)/deciliter  hydrALAZINE Injectable 10 milliGRAM(s) IV Push every 6 hours PRN systolic BP > 160  ondansetron Injectable 4 milliGRAM(s) IV Push every 6 hours PRN Nausea  sodium chloride 0.9% lock flush 10 milliLiter(s) IV Push every 1 hour PRN Pre/post blood products, medications, blood draw, and to maintain line patency    Pertinent Labs: 11-27 Na142 mmol/L Glu 151 mg/dL[H] K+ 4.2 mmol/L Cr  0.85 mg/dL BUN 14 mg/dL 11-27 Phos 3.3 mg/dL 11-27 Alb 2.8 g/dL[L] 11-27 Chol --    LDL --    HDL --    Trig 243 mg/dL[H]     CAPILLARY BLOOD GLUCOSE      POCT Blood Glucose.: 191 mg/dL (27 Nov 2024 11:37)  POCT Blood Glucose.: 134 mg/dL (27 Nov 2024 05:13)  POCT Blood Glucose.: 131 mg/dL (26 Nov 2024 23:35)  POCT Blood Glucose.: 190 mg/dL (26 Nov 2024 18:34)  POCT Blood Glucose.: 174 mg/dL (26 Nov 2024 13:23)    Weight: 142 lbs  Height: 66 in   IBW: 142 lbs +/-10%  BMI: 22.9 kg/m^2    Physical Assessment, per flowsheets:  Edema: No edema noted per RN flowsheets   Pressure Injury: No pressure injuries noted per RN flowsheets     Estimated Needs:   [X] No change since previous assessment    Previous Nutrition Diagnosis: [X] Malnutrition   Nutrition Diagnosis is [X] ongoing   New Nutrition Diagnosis: [X] not applicable     Education:  [X] Provided on previous assessment by RD    Interventions:   1) Wean TPN rate as medically feasible.   2) Continue current low fiber diet, advance as medically feasible.   3) Monitor TPN rate, weights, BM's, I/O's, lipid labs, POCT, ALT/AST, bilirubin and skin integrity.    Rolando Mitchell RD (Available on teams).

## 2024-11-27 NOTE — DISCHARGE NOTE NURSING/CASE MANAGEMENT/SOCIAL WORK - FINANCIAL ASSISTANCE
[facility] provides services at a reduced cost to those who are determined to be eligible through [facility]’s financial assistance program. Information regarding [facility]’s financial assistance program can be found by going to [email or web address] or by calling [phone].

## 2024-11-29 LAB — CA-I BLD-SCNC: 4.9 MG/DL — SIGNIFICANT CHANGE UP (ref 4.5–5.6)

## 2024-12-13 ENCOUNTER — APPOINTMENT (OUTPATIENT)
Dept: SURGERY | Facility: CLINIC | Age: 74
End: 2024-12-13

## 2024-12-13 VITALS
WEIGHT: 135 LBS | OXYGEN SATURATION: 99 % | SYSTOLIC BLOOD PRESSURE: 173 MMHG | TEMPERATURE: 98.2 F | BODY MASS INDEX: 23.05 KG/M2 | HEIGHT: 64 IN | DIASTOLIC BLOOD PRESSURE: 93 MMHG | HEART RATE: 70 BPM

## 2024-12-13 DIAGNOSIS — Z90.49 ACQUIRED ABSENCE OF OTHER SPECIFIED PARTS OF DIGESTIVE TRACT: ICD-10-CM

## 2024-12-13 DIAGNOSIS — Z87.891 PERSONAL HISTORY OF NICOTINE DEPENDENCE: ICD-10-CM

## 2025-07-11 ENCOUNTER — INPATIENT (INPATIENT)
Facility: HOSPITAL | Age: 75
LOS: 7 days | Discharge: HOME | DRG: 390 | End: 2025-07-19
Attending: STUDENT IN AN ORGANIZED HEALTH CARE EDUCATION/TRAINING PROGRAM | Admitting: STUDENT IN AN ORGANIZED HEALTH CARE EDUCATION/TRAINING PROGRAM
Payer: COMMERCIAL

## 2025-07-11 VITALS
WEIGHT: 143.96 LBS | OXYGEN SATURATION: 95 % | SYSTOLIC BLOOD PRESSURE: 166 MMHG | HEART RATE: 60 BPM | TEMPERATURE: 98 F | DIASTOLIC BLOOD PRESSURE: 86 MMHG | RESPIRATION RATE: 17 BRPM | HEIGHT: 62 IN

## 2025-07-11 DIAGNOSIS — K56.609 UNSPECIFIED INTESTINAL OBSTRUCTION, UNSPECIFIED AS TO PARTIAL VERSUS COMPLETE OBSTRUCTION: ICD-10-CM

## 2025-07-11 DIAGNOSIS — Z90.49 ACQUIRED ABSENCE OF OTHER SPECIFIED PARTS OF DIGESTIVE TRACT: Chronic | ICD-10-CM

## 2025-07-11 DIAGNOSIS — Z98.0 INTESTINAL BYPASS AND ANASTOMOSIS STATUS: Chronic | ICD-10-CM

## 2025-07-11 DIAGNOSIS — Z90.3 ACQUIRED ABSENCE OF STOMACH [PART OF]: Chronic | ICD-10-CM

## 2025-07-11 LAB
ALBUMIN SERPL ELPH-MCNC: 3.8 G/DL — SIGNIFICANT CHANGE UP (ref 3.5–5)
ALP SERPL-CCNC: 123 U/L — HIGH (ref 40–120)
ALT FLD-CCNC: 20 U/L DA — SIGNIFICANT CHANGE UP (ref 10–60)
ANION GAP SERPL CALC-SCNC: 4 MMOL/L — LOW (ref 5–17)
APTT BLD: 27.1 SEC — SIGNIFICANT CHANGE UP (ref 26.1–36.8)
AST SERPL-CCNC: 22 U/L — SIGNIFICANT CHANGE UP (ref 10–40)
BASE EXCESS BLDV CALC-SCNC: 4.3 MMOL/L — SIGNIFICANT CHANGE UP
BASOPHILS # BLD AUTO: 0.04 K/UL — SIGNIFICANT CHANGE UP (ref 0–0.2)
BASOPHILS NFR BLD AUTO: 0.3 % — SIGNIFICANT CHANGE UP (ref 0–2)
BILIRUB SERPL-MCNC: 0.7 MG/DL — SIGNIFICANT CHANGE UP (ref 0.2–1.2)
BLD GP AB SCN SERPL QL: SIGNIFICANT CHANGE UP
BLOOD GAS COMMENTS, VENOUS: SIGNIFICANT CHANGE UP
BUN SERPL-MCNC: 14 MG/DL — SIGNIFICANT CHANGE UP (ref 7–18)
CA-I SERPL-SCNC: SIGNIFICANT CHANGE UP MMOL/L (ref 1.15–1.33)
CALCIUM SERPL-MCNC: 9.2 MG/DL — SIGNIFICANT CHANGE UP (ref 8.4–10.5)
CHLORIDE SERPL-SCNC: 100 MMOL/L — SIGNIFICANT CHANGE UP (ref 96–108)
CO2 SERPL-SCNC: 29 MMOL/L — SIGNIFICANT CHANGE UP (ref 22–31)
CREAT SERPL-MCNC: 1.17 MG/DL — SIGNIFICANT CHANGE UP (ref 0.5–1.3)
EGFR: 65 ML/MIN/1.73M2 — SIGNIFICANT CHANGE UP
EGFR: 65 ML/MIN/1.73M2 — SIGNIFICANT CHANGE UP
EOSINOPHIL # BLD AUTO: 0.02 K/UL — SIGNIFICANT CHANGE UP (ref 0–0.5)
EOSINOPHIL NFR BLD AUTO: 0.2 % — SIGNIFICANT CHANGE UP (ref 0–6)
GAS PNL BLDV: 134 MMOL/L — LOW (ref 136–145)
GAS PNL BLDV: SIGNIFICANT CHANGE UP
GLUCOSE BLDV-MCNC: 162 MG/DL — HIGH (ref 70–99)
GLUCOSE SERPL-MCNC: 144 MG/DL — HIGH (ref 70–99)
HCO3 BLDV-SCNC: 32 MMOL/L — HIGH (ref 22–29)
HCT VFR BLD CALC: 46.3 % — SIGNIFICANT CHANGE UP (ref 39–50)
HGB BLD-MCNC: 15.2 G/DL — SIGNIFICANT CHANGE UP (ref 13–17)
HOROWITZ INDEX BLDV+IHG-RTO: 21 — SIGNIFICANT CHANGE UP
IMM GRANULOCYTES NFR BLD AUTO: 0.5 % — SIGNIFICANT CHANGE UP (ref 0–0.9)
INR BLD: 1.05 RATIO — SIGNIFICANT CHANGE UP (ref 0.85–1.16)
LACTATE BLDV-MCNC: 3.3 MMOL/L — HIGH (ref 0.5–2)
LACTATE SERPL-SCNC: 2.2 MMOL/L — HIGH (ref 0.7–2)
LACTATE SERPL-SCNC: 3.1 MMOL/L — HIGH (ref 0.7–2)
LIDOCAIN IGE QN: 35 U/L — SIGNIFICANT CHANGE UP (ref 13–75)
LYMPHOCYTES # BLD AUTO: 1.33 K/UL — SIGNIFICANT CHANGE UP (ref 1–3.3)
LYMPHOCYTES # BLD AUTO: 10.4 % — LOW (ref 13–44)
MCHC RBC-ENTMCNC: 27 PG — SIGNIFICANT CHANGE UP (ref 27–34)
MCHC RBC-ENTMCNC: 32.8 G/DL — SIGNIFICANT CHANGE UP (ref 32–36)
MCV RBC AUTO: 82.2 FL — SIGNIFICANT CHANGE UP (ref 80–100)
MONOCYTES # BLD AUTO: 0.79 K/UL — SIGNIFICANT CHANGE UP (ref 0–0.9)
MONOCYTES NFR BLD AUTO: 6.2 % — SIGNIFICANT CHANGE UP (ref 2–14)
NEUTROPHILS # BLD AUTO: 10.51 K/UL — HIGH (ref 1.8–7.4)
NEUTROPHILS NFR BLD AUTO: 82.4 % — HIGH (ref 43–77)
NRBC BLD AUTO-RTO: 0 /100 WBCS — SIGNIFICANT CHANGE UP (ref 0–0)
PCO2 BLDV: 59 MMHG — HIGH (ref 42–55)
PH BLDV: 7.34 — SIGNIFICANT CHANGE UP (ref 7.32–7.43)
PLATELET # BLD AUTO: 279 K/UL — SIGNIFICANT CHANGE UP (ref 150–400)
PO2 BLDV: 25 MMHG — SIGNIFICANT CHANGE UP
POTASSIUM BLDV-SCNC: 4.7 MMOL/L — SIGNIFICANT CHANGE UP (ref 3.5–5.1)
POTASSIUM SERPL-MCNC: 4.7 MMOL/L — SIGNIFICANT CHANGE UP (ref 3.5–5.3)
POTASSIUM SERPL-SCNC: 4.7 MMOL/L — SIGNIFICANT CHANGE UP (ref 3.5–5.3)
PROT SERPL-MCNC: 7.8 G/DL — SIGNIFICANT CHANGE UP (ref 6–8.3)
PROTHROM AB SERPL-ACNC: 12.2 SEC — SIGNIFICANT CHANGE UP (ref 9.9–13.4)
RBC # BLD: 5.63 M/UL — SIGNIFICANT CHANGE UP (ref 4.2–5.8)
RBC # FLD: 14.6 % — HIGH (ref 10.3–14.5)
SAO2 % BLDV: 40.5 % — SIGNIFICANT CHANGE UP
SODIUM SERPL-SCNC: 133 MMOL/L — LOW (ref 135–145)
WBC # BLD: 12.76 K/UL — HIGH (ref 3.8–10.5)
WBC # FLD AUTO: 12.76 K/UL — HIGH (ref 3.8–10.5)

## 2025-07-11 PROCEDURE — 85025 COMPLETE CBC W/AUTO DIFF WBC: CPT

## 2025-07-11 PROCEDURE — 86900 BLOOD TYPING SEROLOGIC ABO: CPT

## 2025-07-11 PROCEDURE — 84132 ASSAY OF SERUM POTASSIUM: CPT

## 2025-07-11 PROCEDURE — 83690 ASSAY OF LIPASE: CPT

## 2025-07-11 PROCEDURE — 85730 THROMBOPLASTIN TIME PARTIAL: CPT

## 2025-07-11 PROCEDURE — 71045 X-RAY EXAM CHEST 1 VIEW: CPT | Mod: 26

## 2025-07-11 PROCEDURE — 71045 X-RAY EXAM CHEST 1 VIEW: CPT

## 2025-07-11 PROCEDURE — 93010 ELECTROCARDIOGRAM REPORT: CPT

## 2025-07-11 PROCEDURE — 85610 PROTHROMBIN TIME: CPT

## 2025-07-11 PROCEDURE — 74177 CT ABD & PELVIS W/CONTRAST: CPT

## 2025-07-11 PROCEDURE — 82947 ASSAY GLUCOSE BLOOD QUANT: CPT

## 2025-07-11 PROCEDURE — 84295 ASSAY OF SERUM SODIUM: CPT

## 2025-07-11 PROCEDURE — 82803 BLOOD GASES ANY COMBINATION: CPT

## 2025-07-11 PROCEDURE — 86901 BLOOD TYPING SEROLOGIC RH(D): CPT

## 2025-07-11 PROCEDURE — 86850 RBC ANTIBODY SCREEN: CPT

## 2025-07-11 PROCEDURE — 74177 CT ABD & PELVIS W/CONTRAST: CPT | Mod: 26

## 2025-07-11 PROCEDURE — 36415 COLL VENOUS BLD VENIPUNCTURE: CPT

## 2025-07-11 PROCEDURE — 82330 ASSAY OF CALCIUM: CPT

## 2025-07-11 PROCEDURE — 99285 EMERGENCY DEPT VISIT HI MDM: CPT

## 2025-07-11 PROCEDURE — 83605 ASSAY OF LACTIC ACID: CPT

## 2025-07-11 PROCEDURE — 80053 COMPREHEN METABOLIC PANEL: CPT

## 2025-07-11 RX ORDER — GLUCAGON 3 MG/1
1 POWDER NASAL ONCE
Refills: 0 | Status: DISCONTINUED | OUTPATIENT
Start: 2025-07-11 | End: 2025-07-17

## 2025-07-11 RX ORDER — HEPARIN SODIUM 1000 [USP'U]/ML
5000 INJECTION INTRAVENOUS; SUBCUTANEOUS EVERY 8 HOURS
Refills: 0 | Status: DISCONTINUED | OUTPATIENT
Start: 2025-07-11 | End: 2025-07-19

## 2025-07-11 RX ORDER — SODIUM CHLORIDE 9 G/1000ML
1000 INJECTION, SOLUTION INTRAVENOUS
Refills: 0 | Status: DISCONTINUED | OUTPATIENT
Start: 2025-07-11 | End: 2025-07-17

## 2025-07-11 RX ORDER — SODIUM CHLORIDE 9 G/1000ML
2000 INJECTION, SOLUTION INTRAVENOUS ONCE
Refills: 0 | Status: COMPLETED | OUTPATIENT
Start: 2025-07-11 | End: 2025-07-11

## 2025-07-11 RX ORDER — ONDANSETRON HCL/PF 4 MG/2 ML
4 VIAL (ML) INJECTION ONCE
Refills: 0 | Status: COMPLETED | OUTPATIENT
Start: 2025-07-11 | End: 2025-07-11

## 2025-07-11 RX ORDER — DEXTROSE 50 % IN WATER 50 %
15 SYRINGE (ML) INTRAVENOUS ONCE
Refills: 0 | Status: DISCONTINUED | OUTPATIENT
Start: 2025-07-11 | End: 2025-07-17

## 2025-07-11 RX ORDER — DEXTROSE 50 % IN WATER 50 %
25 SYRINGE (ML) INTRAVENOUS ONCE
Refills: 0 | Status: DISCONTINUED | OUTPATIENT
Start: 2025-07-11 | End: 2025-07-17

## 2025-07-11 RX ORDER — DEXTROSE 50 % IN WATER 50 %
12.5 SYRINGE (ML) INTRAVENOUS ONCE
Refills: 0 | Status: DISCONTINUED | OUTPATIENT
Start: 2025-07-11 | End: 2025-07-17

## 2025-07-11 RX ORDER — INSULIN LISPRO 100 U/ML
INJECTION, SOLUTION INTRAVENOUS; SUBCUTANEOUS EVERY 6 HOURS
Refills: 0 | Status: DISCONTINUED | OUTPATIENT
Start: 2025-07-11 | End: 2025-07-17

## 2025-07-11 RX ADMIN — Medication 4 MILLIGRAM(S): at 19:00

## 2025-07-11 RX ADMIN — Medication 4 MILLIGRAM(S): at 23:36

## 2025-07-11 RX ADMIN — SODIUM CHLORIDE 2000 MILLILITER(S): 9 INJECTION, SOLUTION INTRAVENOUS at 19:00

## 2025-07-11 RX ADMIN — SODIUM CHLORIDE 2000 MILLILITER(S): 9 INJECTION, SOLUTION INTRAVENOUS at 23:41

## 2025-07-12 LAB
A1C WITH ESTIMATED AVERAGE GLUCOSE RESULT: 6.4 % — HIGH (ref 4–5.6)
ESTIMATED AVERAGE GLUCOSE: 137 MG/DL — HIGH (ref 68–114)
GLUCOSE BLDC GLUCOMTR-MCNC: 105 MG/DL — HIGH (ref 70–99)
GLUCOSE BLDC GLUCOMTR-MCNC: 108 MG/DL — HIGH (ref 70–99)
GLUCOSE BLDC GLUCOMTR-MCNC: 118 MG/DL — HIGH (ref 70–99)
GLUCOSE BLDC GLUCOMTR-MCNC: 118 MG/DL — HIGH (ref 70–99)

## 2025-07-12 PROCEDURE — 86900 BLOOD TYPING SEROLOGIC ABO: CPT

## 2025-07-12 PROCEDURE — 83690 ASSAY OF LIPASE: CPT

## 2025-07-12 PROCEDURE — 82947 ASSAY GLUCOSE BLOOD QUANT: CPT

## 2025-07-12 PROCEDURE — 71045 X-RAY EXAM CHEST 1 VIEW: CPT

## 2025-07-12 PROCEDURE — 86901 BLOOD TYPING SEROLOGIC RH(D): CPT

## 2025-07-12 PROCEDURE — 85025 COMPLETE CBC W/AUTO DIFF WBC: CPT

## 2025-07-12 PROCEDURE — 84295 ASSAY OF SERUM SODIUM: CPT

## 2025-07-12 PROCEDURE — 83605 ASSAY OF LACTIC ACID: CPT

## 2025-07-12 PROCEDURE — 84132 ASSAY OF SERUM POTASSIUM: CPT

## 2025-07-12 PROCEDURE — 85610 PROTHROMBIN TIME: CPT

## 2025-07-12 PROCEDURE — 82962 GLUCOSE BLOOD TEST: CPT

## 2025-07-12 PROCEDURE — 80053 COMPREHEN METABOLIC PANEL: CPT

## 2025-07-12 PROCEDURE — 74177 CT ABD & PELVIS W/CONTRAST: CPT

## 2025-07-12 PROCEDURE — 82330 ASSAY OF CALCIUM: CPT

## 2025-07-12 PROCEDURE — 99222 1ST HOSP IP/OBS MODERATE 55: CPT

## 2025-07-12 PROCEDURE — 86850 RBC ANTIBODY SCREEN: CPT

## 2025-07-12 PROCEDURE — 85730 THROMBOPLASTIN TIME PARTIAL: CPT

## 2025-07-12 PROCEDURE — 36415 COLL VENOUS BLD VENIPUNCTURE: CPT

## 2025-07-12 PROCEDURE — 82803 BLOOD GASES ANY COMBINATION: CPT

## 2025-07-12 PROCEDURE — 83036 HEMOGLOBIN GLYCOSYLATED A1C: CPT

## 2025-07-12 RX ORDER — ACETAMINOPHEN 500 MG/5ML
1000 LIQUID (ML) ORAL EVERY 6 HOURS
Refills: 0 | Status: COMPLETED | OUTPATIENT
Start: 2025-07-12 | End: 2025-07-16

## 2025-07-12 RX ORDER — METOPROLOL SUCCINATE 50 MG/1
5 TABLET, EXTENDED RELEASE ORAL ONCE
Refills: 0 | Status: COMPLETED | OUTPATIENT
Start: 2025-07-12 | End: 2025-07-12

## 2025-07-12 RX ADMIN — Medication 10 MILLIGRAM(S): at 11:32

## 2025-07-12 RX ADMIN — Medication 10 MILLIGRAM(S): at 19:11

## 2025-07-12 RX ADMIN — Medication 10 MILLIGRAM(S): at 23:03

## 2025-07-12 RX ADMIN — METOPROLOL SUCCINATE 5 MILLIGRAM(S): 50 TABLET, EXTENDED RELEASE ORAL at 07:05

## 2025-07-12 RX ADMIN — Medication 400 MILLIGRAM(S): at 16:40

## 2025-07-12 RX ADMIN — HEPARIN SODIUM 5000 UNIT(S): 1000 INJECTION INTRAVENOUS; SUBCUTANEOUS at 15:10

## 2025-07-12 RX ADMIN — HEPARIN SODIUM 5000 UNIT(S): 1000 INJECTION INTRAVENOUS; SUBCUTANEOUS at 23:03

## 2025-07-12 RX ADMIN — SODIUM CHLORIDE 125 MILLILITER(S): 9 INJECTION, SOLUTION INTRAVENOUS at 00:51

## 2025-07-12 RX ADMIN — Medication 10 MILLIGRAM(S): at 00:49

## 2025-07-12 RX ADMIN — Medication 40 MILLIGRAM(S): at 11:33

## 2025-07-12 RX ADMIN — HEPARIN SODIUM 5000 UNIT(S): 1000 INJECTION INTRAVENOUS; SUBCUTANEOUS at 05:53

## 2025-07-12 RX ADMIN — Medication 1000 MILLIGRAM(S): at 17:25

## 2025-07-13 LAB
ANION GAP SERPL CALC-SCNC: 5 MMOL/L — SIGNIFICANT CHANGE UP (ref 5–17)
BUN SERPL-MCNC: 14 MG/DL — SIGNIFICANT CHANGE UP (ref 7–18)
CALCIUM SERPL-MCNC: 9 MG/DL — SIGNIFICANT CHANGE UP (ref 8.4–10.5)
CHLORIDE SERPL-SCNC: 99 MMOL/L — SIGNIFICANT CHANGE UP (ref 96–108)
CO2 SERPL-SCNC: 30 MMOL/L — SIGNIFICANT CHANGE UP (ref 22–31)
CREAT SERPL-MCNC: 1 MG/DL — SIGNIFICANT CHANGE UP (ref 0.5–1.3)
EGFR: 78 ML/MIN/1.73M2 — SIGNIFICANT CHANGE UP
EGFR: 78 ML/MIN/1.73M2 — SIGNIFICANT CHANGE UP
GLUCOSE BLDC GLUCOMTR-MCNC: 103 MG/DL — HIGH (ref 70–99)
GLUCOSE BLDC GLUCOMTR-MCNC: 116 MG/DL — HIGH (ref 70–99)
GLUCOSE BLDC GLUCOMTR-MCNC: 125 MG/DL — HIGH (ref 70–99)
GLUCOSE BLDC GLUCOMTR-MCNC: 95 MG/DL — SIGNIFICANT CHANGE UP (ref 70–99)
GLUCOSE SERPL-MCNC: 98 MG/DL — SIGNIFICANT CHANGE UP (ref 70–99)
HCT VFR BLD CALC: 44.5 % — SIGNIFICANT CHANGE UP (ref 39–50)
HGB BLD-MCNC: 14.8 G/DL — SIGNIFICANT CHANGE UP (ref 13–17)
LDH SERPL L TO P-CCNC: 195 U/L — SIGNIFICANT CHANGE UP (ref 120–225)
MCHC RBC-ENTMCNC: 27.4 PG — SIGNIFICANT CHANGE UP (ref 27–34)
MCHC RBC-ENTMCNC: 33.3 G/DL — SIGNIFICANT CHANGE UP (ref 32–36)
MCV RBC AUTO: 82.3 FL — SIGNIFICANT CHANGE UP (ref 80–100)
NRBC BLD AUTO-RTO: 0 /100 WBCS — SIGNIFICANT CHANGE UP (ref 0–0)
PLATELET # BLD AUTO: 273 K/UL — SIGNIFICANT CHANGE UP (ref 150–400)
POTASSIUM SERPL-MCNC: 4.4 MMOL/L — SIGNIFICANT CHANGE UP (ref 3.5–5.3)
POTASSIUM SERPL-SCNC: 4.4 MMOL/L — SIGNIFICANT CHANGE UP (ref 3.5–5.3)
RBC # BLD: 5.41 M/UL — SIGNIFICANT CHANGE UP (ref 4.2–5.8)
RBC # FLD: 14.9 % — HIGH (ref 10.3–14.5)
SODIUM SERPL-SCNC: 134 MMOL/L — LOW (ref 135–145)
WBC # BLD: 9.21 K/UL — SIGNIFICANT CHANGE UP (ref 3.8–10.5)
WBC # FLD AUTO: 9.21 K/UL — SIGNIFICANT CHANGE UP (ref 3.8–10.5)

## 2025-07-13 PROCEDURE — 80048 BASIC METABOLIC PNL TOTAL CA: CPT

## 2025-07-13 PROCEDURE — 36415 COLL VENOUS BLD VENIPUNCTURE: CPT

## 2025-07-13 PROCEDURE — 86850 RBC ANTIBODY SCREEN: CPT

## 2025-07-13 PROCEDURE — 85730 THROMBOPLASTIN TIME PARTIAL: CPT

## 2025-07-13 PROCEDURE — 83615 LACTATE (LD) (LDH) ENZYME: CPT

## 2025-07-13 PROCEDURE — 82330 ASSAY OF CALCIUM: CPT

## 2025-07-13 PROCEDURE — 84132 ASSAY OF SERUM POTASSIUM: CPT

## 2025-07-13 PROCEDURE — 82947 ASSAY GLUCOSE BLOOD QUANT: CPT

## 2025-07-13 PROCEDURE — 86901 BLOOD TYPING SEROLOGIC RH(D): CPT

## 2025-07-13 PROCEDURE — 83690 ASSAY OF LIPASE: CPT

## 2025-07-13 PROCEDURE — 74177 CT ABD & PELVIS W/CONTRAST: CPT

## 2025-07-13 PROCEDURE — 82962 GLUCOSE BLOOD TEST: CPT

## 2025-07-13 PROCEDURE — 84295 ASSAY OF SERUM SODIUM: CPT

## 2025-07-13 PROCEDURE — 82803 BLOOD GASES ANY COMBINATION: CPT

## 2025-07-13 PROCEDURE — 83605 ASSAY OF LACTIC ACID: CPT

## 2025-07-13 PROCEDURE — 71045 X-RAY EXAM CHEST 1 VIEW: CPT

## 2025-07-13 PROCEDURE — 83036 HEMOGLOBIN GLYCOSYLATED A1C: CPT

## 2025-07-13 PROCEDURE — 74250 X-RAY XM SM INT 1CNTRST STD: CPT

## 2025-07-13 PROCEDURE — 80053 COMPREHEN METABOLIC PANEL: CPT

## 2025-07-13 PROCEDURE — 85610 PROTHROMBIN TIME: CPT

## 2025-07-13 PROCEDURE — 74250 X-RAY XM SM INT 1CNTRST STD: CPT | Mod: 26

## 2025-07-13 PROCEDURE — 85027 COMPLETE CBC AUTOMATED: CPT

## 2025-07-13 PROCEDURE — 86900 BLOOD TYPING SEROLOGIC ABO: CPT

## 2025-07-13 PROCEDURE — 85025 COMPLETE CBC W/AUTO DIFF WBC: CPT

## 2025-07-13 PROCEDURE — 99231 SBSQ HOSP IP/OBS SF/LOW 25: CPT

## 2025-07-13 RX ORDER — METOCLOPRAMIDE HCL 10 MG
10 TABLET ORAL EVERY 4 HOURS
Refills: 0 | Status: DISCONTINUED | OUTPATIENT
Start: 2025-07-13 | End: 2025-07-15

## 2025-07-13 RX ORDER — ONDANSETRON HCL/PF 4 MG/2 ML
4 VIAL (ML) INJECTION ONCE
Refills: 0 | Status: COMPLETED | OUTPATIENT
Start: 2025-07-13 | End: 2025-07-13

## 2025-07-13 RX ORDER — DIATRIZOATE MEGLUMINE, SODIUM 66 %-10 %
90 VIAL (ML) INJECTION ONCE
Refills: 0 | Status: COMPLETED | OUTPATIENT
Start: 2025-07-13 | End: 2025-07-13

## 2025-07-13 RX ORDER — ONDANSETRON HCL/PF 4 MG/2 ML
4 VIAL (ML) INJECTION EVERY 4 HOURS
Refills: 0 | Status: COMPLETED | OUTPATIENT
Start: 2025-07-13 | End: 2025-07-14

## 2025-07-13 RX ADMIN — Medication 10 MILLIGRAM(S): at 16:19

## 2025-07-13 RX ADMIN — Medication 400 MILLIGRAM(S): at 11:27

## 2025-07-13 RX ADMIN — HEPARIN SODIUM 5000 UNIT(S): 1000 INJECTION INTRAVENOUS; SUBCUTANEOUS at 05:33

## 2025-07-13 RX ADMIN — Medication 4 MILLIGRAM(S): at 18:33

## 2025-07-13 RX ADMIN — Medication 1000 MILLIGRAM(S): at 12:00

## 2025-07-13 RX ADMIN — Medication 10 MILLIGRAM(S): at 18:33

## 2025-07-13 RX ADMIN — Medication 10 MILLIGRAM(S): at 05:33

## 2025-07-13 RX ADMIN — Medication 4 MILLIGRAM(S): at 21:27

## 2025-07-13 RX ADMIN — HEPARIN SODIUM 5000 UNIT(S): 1000 INJECTION INTRAVENOUS; SUBCUTANEOUS at 21:25

## 2025-07-13 RX ADMIN — Medication 10 MILLIGRAM(S): at 13:17

## 2025-07-13 RX ADMIN — Medication 4 MILLIGRAM(S): at 13:21

## 2025-07-13 RX ADMIN — Medication 4 MILLIGRAM(S): at 01:01

## 2025-07-13 RX ADMIN — Medication 400 MILLIGRAM(S): at 21:29

## 2025-07-13 RX ADMIN — HEPARIN SODIUM 5000 UNIT(S): 1000 INJECTION INTRAVENOUS; SUBCUTANEOUS at 13:20

## 2025-07-13 RX ADMIN — Medication 40 MILLIGRAM(S): at 13:20

## 2025-07-13 RX ADMIN — Medication 90 MILLILITER(S): at 10:31

## 2025-07-14 LAB
ANION GAP SERPL CALC-SCNC: 6 MMOL/L — SIGNIFICANT CHANGE UP (ref 5–17)
BLD GP AB SCN SERPL QL: SIGNIFICANT CHANGE UP
BUN SERPL-MCNC: 18 MG/DL — SIGNIFICANT CHANGE UP (ref 7–18)
CALCIUM SERPL-MCNC: 8.4 MG/DL — SIGNIFICANT CHANGE UP (ref 8.4–10.5)
CHLORIDE SERPL-SCNC: 98 MMOL/L — SIGNIFICANT CHANGE UP (ref 96–108)
CO2 SERPL-SCNC: 30 MMOL/L — SIGNIFICANT CHANGE UP (ref 22–31)
CREAT SERPL-MCNC: 1.06 MG/DL — SIGNIFICANT CHANGE UP (ref 0.5–1.3)
EGFR: 73 ML/MIN/1.73M2 — SIGNIFICANT CHANGE UP
EGFR: 73 ML/MIN/1.73M2 — SIGNIFICANT CHANGE UP
GLUCOSE BLDC GLUCOMTR-MCNC: 111 MG/DL — HIGH (ref 70–99)
GLUCOSE BLDC GLUCOMTR-MCNC: 111 MG/DL — HIGH (ref 70–99)
GLUCOSE BLDC GLUCOMTR-MCNC: 112 MG/DL — HIGH (ref 70–99)
GLUCOSE BLDC GLUCOMTR-MCNC: 94 MG/DL — SIGNIFICANT CHANGE UP (ref 70–99)
GLUCOSE SERPL-MCNC: 110 MG/DL — HIGH (ref 70–99)
HCT VFR BLD CALC: 43.8 % — SIGNIFICANT CHANGE UP (ref 39–50)
HGB BLD-MCNC: 14.5 G/DL — SIGNIFICANT CHANGE UP (ref 13–17)
MAGNESIUM SERPL-MCNC: 2.5 MG/DL — SIGNIFICANT CHANGE UP (ref 1.6–2.6)
MCHC RBC-ENTMCNC: 27.1 PG — SIGNIFICANT CHANGE UP (ref 27–34)
MCHC RBC-ENTMCNC: 33.1 G/DL — SIGNIFICANT CHANGE UP (ref 32–36)
MCV RBC AUTO: 81.9 FL — SIGNIFICANT CHANGE UP (ref 80–100)
NRBC BLD AUTO-RTO: 0 /100 WBCS — SIGNIFICANT CHANGE UP (ref 0–0)
PHOSPHATE SERPL-MCNC: 3.8 MG/DL — SIGNIFICANT CHANGE UP (ref 2.5–4.5)
PLATELET # BLD AUTO: 277 K/UL — SIGNIFICANT CHANGE UP (ref 150–400)
POTASSIUM SERPL-MCNC: 3.7 MMOL/L — SIGNIFICANT CHANGE UP (ref 3.5–5.3)
POTASSIUM SERPL-SCNC: 3.7 MMOL/L — SIGNIFICANT CHANGE UP (ref 3.5–5.3)
RBC # BLD: 5.35 M/UL — SIGNIFICANT CHANGE UP (ref 4.2–5.8)
RBC # FLD: 15.1 % — HIGH (ref 10.3–14.5)
SODIUM SERPL-SCNC: 134 MMOL/L — LOW (ref 135–145)
WBC # BLD: 10.01 K/UL — SIGNIFICANT CHANGE UP (ref 3.8–10.5)
WBC # FLD AUTO: 10.01 K/UL — SIGNIFICANT CHANGE UP (ref 3.8–10.5)

## 2025-07-14 PROCEDURE — 99231 SBSQ HOSP IP/OBS SF/LOW 25: CPT

## 2025-07-14 PROCEDURE — 74018 RADEX ABDOMEN 1 VIEW: CPT | Mod: 26

## 2025-07-14 RX ADMIN — Medication 1000 MILLIGRAM(S): at 19:35

## 2025-07-14 RX ADMIN — Medication 10 MILLIGRAM(S): at 17:58

## 2025-07-14 RX ADMIN — Medication 4 MILLIGRAM(S): at 05:57

## 2025-07-14 RX ADMIN — Medication 10 MILLIGRAM(S): at 05:57

## 2025-07-14 RX ADMIN — Medication 10 MILLIGRAM(S): at 11:22

## 2025-07-14 RX ADMIN — Medication 10 MILLIGRAM(S): at 23:16

## 2025-07-14 RX ADMIN — HEPARIN SODIUM 5000 UNIT(S): 1000 INJECTION INTRAVENOUS; SUBCUTANEOUS at 05:57

## 2025-07-14 RX ADMIN — HEPARIN SODIUM 5000 UNIT(S): 1000 INJECTION INTRAVENOUS; SUBCUTANEOUS at 21:26

## 2025-07-14 RX ADMIN — SODIUM CHLORIDE 125 MILLILITER(S): 9 INJECTION, SOLUTION INTRAVENOUS at 18:05

## 2025-07-14 RX ADMIN — Medication 40 MILLIGRAM(S): at 11:22

## 2025-07-14 RX ADMIN — Medication 10 MILLIGRAM(S): at 00:41

## 2025-07-15 LAB
ANION GAP SERPL CALC-SCNC: 8 MMOL/L — SIGNIFICANT CHANGE UP (ref 5–17)
BUN SERPL-MCNC: 18 MG/DL — SIGNIFICANT CHANGE UP (ref 7–18)
CALCIUM SERPL-MCNC: 8.5 MG/DL — SIGNIFICANT CHANGE UP (ref 8.4–10.5)
CHLORIDE SERPL-SCNC: 99 MMOL/L — SIGNIFICANT CHANGE UP (ref 96–108)
CO2 SERPL-SCNC: 28 MMOL/L — SIGNIFICANT CHANGE UP (ref 22–31)
CREAT SERPL-MCNC: 1.03 MG/DL — SIGNIFICANT CHANGE UP (ref 0.5–1.3)
EGFR: 76 ML/MIN/1.73M2 — SIGNIFICANT CHANGE UP
EGFR: 76 ML/MIN/1.73M2 — SIGNIFICANT CHANGE UP
GLUCOSE BLDC GLUCOMTR-MCNC: 85 MG/DL — SIGNIFICANT CHANGE UP (ref 70–99)
GLUCOSE BLDC GLUCOMTR-MCNC: 86 MG/DL — SIGNIFICANT CHANGE UP (ref 70–99)
GLUCOSE BLDC GLUCOMTR-MCNC: 87 MG/DL — SIGNIFICANT CHANGE UP (ref 70–99)
GLUCOSE BLDC GLUCOMTR-MCNC: 92 MG/DL — SIGNIFICANT CHANGE UP (ref 70–99)
GLUCOSE SERPL-MCNC: 91 MG/DL — SIGNIFICANT CHANGE UP (ref 70–99)
HCT VFR BLD CALC: 44.1 % — SIGNIFICANT CHANGE UP (ref 39–50)
HGB BLD-MCNC: 14.4 G/DL — SIGNIFICANT CHANGE UP (ref 13–17)
MCHC RBC-ENTMCNC: 27.2 PG — SIGNIFICANT CHANGE UP (ref 27–34)
MCHC RBC-ENTMCNC: 32.7 G/DL — SIGNIFICANT CHANGE UP (ref 32–36)
MCV RBC AUTO: 83.2 FL — SIGNIFICANT CHANGE UP (ref 80–100)
NRBC BLD AUTO-RTO: 0 /100 WBCS — SIGNIFICANT CHANGE UP (ref 0–0)
PLATELET # BLD AUTO: 284 K/UL — SIGNIFICANT CHANGE UP (ref 150–400)
POTASSIUM SERPL-MCNC: 3.5 MMOL/L — SIGNIFICANT CHANGE UP (ref 3.5–5.3)
POTASSIUM SERPL-SCNC: 3.5 MMOL/L — SIGNIFICANT CHANGE UP (ref 3.5–5.3)
RBC # BLD: 5.3 M/UL — SIGNIFICANT CHANGE UP (ref 4.2–5.8)
RBC # FLD: 15.2 % — HIGH (ref 10.3–14.5)
SODIUM SERPL-SCNC: 135 MMOL/L — SIGNIFICANT CHANGE UP (ref 135–145)
WBC # BLD: 9.56 K/UL — SIGNIFICANT CHANGE UP (ref 3.8–10.5)
WBC # FLD AUTO: 9.56 K/UL — SIGNIFICANT CHANGE UP (ref 3.8–10.5)

## 2025-07-15 PROCEDURE — 99231 SBSQ HOSP IP/OBS SF/LOW 25: CPT

## 2025-07-15 PROCEDURE — 74018 RADEX ABDOMEN 1 VIEW: CPT | Mod: 26

## 2025-07-15 RX ORDER — METOCLOPRAMIDE HCL 10 MG
5 TABLET ORAL EVERY 12 HOURS
Refills: 0 | Status: DISCONTINUED | OUTPATIENT
Start: 2025-07-15 | End: 2025-07-15

## 2025-07-15 RX ORDER — METOCLOPRAMIDE HCL 10 MG
5 TABLET ORAL EVERY 12 HOURS
Refills: 0 | Status: DISCONTINUED | OUTPATIENT
Start: 2025-07-15 | End: 2025-07-18

## 2025-07-15 RX ADMIN — HEPARIN SODIUM 5000 UNIT(S): 1000 INJECTION INTRAVENOUS; SUBCUTANEOUS at 05:48

## 2025-07-15 RX ADMIN — Medication 10 MILLIGRAM(S): at 13:01

## 2025-07-15 RX ADMIN — SODIUM CHLORIDE 125 MILLILITER(S): 9 INJECTION, SOLUTION INTRAVENOUS at 18:16

## 2025-07-15 RX ADMIN — SODIUM CHLORIDE 125 MILLILITER(S): 9 INJECTION, SOLUTION INTRAVENOUS at 13:02

## 2025-07-15 RX ADMIN — Medication 5 MILLIGRAM(S): at 18:16

## 2025-07-15 RX ADMIN — HEPARIN SODIUM 5000 UNIT(S): 1000 INJECTION INTRAVENOUS; SUBCUTANEOUS at 13:05

## 2025-07-15 RX ADMIN — Medication 40 MILLIGRAM(S): at 13:01

## 2025-07-15 RX ADMIN — Medication 10 MILLIGRAM(S): at 05:05

## 2025-07-15 RX ADMIN — Medication 10 MILLIGRAM(S): at 18:16

## 2025-07-16 LAB
ANION GAP SERPL CALC-SCNC: 5 MMOL/L — SIGNIFICANT CHANGE UP (ref 5–17)
BUN SERPL-MCNC: 17 MG/DL — SIGNIFICANT CHANGE UP (ref 7–18)
CALCIUM SERPL-MCNC: 8 MG/DL — LOW (ref 8.4–10.5)
CHLORIDE SERPL-SCNC: 100 MMOL/L — SIGNIFICANT CHANGE UP (ref 96–108)
CO2 SERPL-SCNC: 28 MMOL/L — SIGNIFICANT CHANGE UP (ref 22–31)
CREAT SERPL-MCNC: 0.88 MG/DL — SIGNIFICANT CHANGE UP (ref 0.5–1.3)
EGFR: 90 ML/MIN/1.73M2 — SIGNIFICANT CHANGE UP
EGFR: 90 ML/MIN/1.73M2 — SIGNIFICANT CHANGE UP
GLUCOSE BLDC GLUCOMTR-MCNC: 78 MG/DL — SIGNIFICANT CHANGE UP (ref 70–99)
GLUCOSE BLDC GLUCOMTR-MCNC: 83 MG/DL — SIGNIFICANT CHANGE UP (ref 70–99)
GLUCOSE BLDC GLUCOMTR-MCNC: 86 MG/DL — SIGNIFICANT CHANGE UP (ref 70–99)
GLUCOSE SERPL-MCNC: 80 MG/DL — SIGNIFICANT CHANGE UP (ref 70–99)
HCT VFR BLD CALC: 38.9 % — LOW (ref 39–50)
HGB BLD-MCNC: 12.9 G/DL — LOW (ref 13–17)
MCHC RBC-ENTMCNC: 27.2 PG — SIGNIFICANT CHANGE UP (ref 27–34)
MCHC RBC-ENTMCNC: 33.2 G/DL — SIGNIFICANT CHANGE UP (ref 32–36)
MCV RBC AUTO: 82.1 FL — SIGNIFICANT CHANGE UP (ref 80–100)
NRBC BLD AUTO-RTO: 0 /100 WBCS — SIGNIFICANT CHANGE UP (ref 0–0)
PLATELET # BLD AUTO: 239 K/UL — SIGNIFICANT CHANGE UP (ref 150–400)
POTASSIUM SERPL-MCNC: 4.2 MMOL/L — SIGNIFICANT CHANGE UP (ref 3.5–5.3)
POTASSIUM SERPL-SCNC: 4.2 MMOL/L — SIGNIFICANT CHANGE UP (ref 3.5–5.3)
RBC # BLD: 4.74 M/UL — SIGNIFICANT CHANGE UP (ref 4.2–5.8)
RBC # FLD: 15.3 % — HIGH (ref 10.3–14.5)
SODIUM SERPL-SCNC: 133 MMOL/L — LOW (ref 135–145)
WBC # BLD: 8.41 K/UL — SIGNIFICANT CHANGE UP (ref 3.8–10.5)
WBC # FLD AUTO: 8.41 K/UL — SIGNIFICANT CHANGE UP (ref 3.8–10.5)

## 2025-07-16 PROCEDURE — 74018 RADEX ABDOMEN 1 VIEW: CPT | Mod: 26

## 2025-07-16 RX ORDER — ONDANSETRON HCL/PF 4 MG/2 ML
4 VIAL (ML) INJECTION EVERY 6 HOURS
Refills: 0 | Status: DISCONTINUED | OUTPATIENT
Start: 2025-07-16 | End: 2025-07-19

## 2025-07-16 RX ORDER — ACETAMINOPHEN 500 MG/5ML
1000 LIQUID (ML) ORAL ONCE
Refills: 0 | Status: DISCONTINUED | OUTPATIENT
Start: 2025-07-16 | End: 2025-07-19

## 2025-07-16 RX ADMIN — HEPARIN SODIUM 5000 UNIT(S): 1000 INJECTION INTRAVENOUS; SUBCUTANEOUS at 00:00

## 2025-07-16 RX ADMIN — Medication 5 MILLIGRAM(S): at 18:23

## 2025-07-16 RX ADMIN — Medication 5 MILLIGRAM(S): at 05:52

## 2025-07-16 RX ADMIN — Medication 10 MILLIGRAM(S): at 00:00

## 2025-07-16 RX ADMIN — Medication 10 MILLIGRAM(S): at 12:29

## 2025-07-16 RX ADMIN — HEPARIN SODIUM 5000 UNIT(S): 1000 INJECTION INTRAVENOUS; SUBCUTANEOUS at 05:52

## 2025-07-16 RX ADMIN — Medication 10 MILLIGRAM(S): at 05:51

## 2025-07-16 RX ADMIN — HEPARIN SODIUM 5000 UNIT(S): 1000 INJECTION INTRAVENOUS; SUBCUTANEOUS at 15:12

## 2025-07-16 RX ADMIN — Medication 40 MILLIGRAM(S): at 12:29

## 2025-07-16 RX ADMIN — Medication 400 MILLIGRAM(S): at 00:00

## 2025-07-16 RX ADMIN — Medication 1000 MILLIGRAM(S): at 01:00

## 2025-07-16 RX ADMIN — Medication 10 MILLIGRAM(S): at 18:22

## 2025-07-17 LAB
ANION GAP SERPL CALC-SCNC: 9 MMOL/L — SIGNIFICANT CHANGE UP (ref 5–17)
BUN SERPL-MCNC: 12 MG/DL — SIGNIFICANT CHANGE UP (ref 7–18)
CALCIUM SERPL-MCNC: 7.9 MG/DL — LOW (ref 8.4–10.5)
CHLORIDE SERPL-SCNC: 101 MMOL/L — SIGNIFICANT CHANGE UP (ref 96–108)
CO2 SERPL-SCNC: 24 MMOL/L — SIGNIFICANT CHANGE UP (ref 22–31)
CREAT SERPL-MCNC: 0.79 MG/DL — SIGNIFICANT CHANGE UP (ref 0.5–1.3)
EGFR: 93 ML/MIN/1.73M2 — SIGNIFICANT CHANGE UP
EGFR: 93 ML/MIN/1.73M2 — SIGNIFICANT CHANGE UP
GLUCOSE BLDC GLUCOMTR-MCNC: 116 MG/DL — HIGH (ref 70–99)
GLUCOSE BLDC GLUCOMTR-MCNC: 124 MG/DL — HIGH (ref 70–99)
GLUCOSE BLDC GLUCOMTR-MCNC: 73 MG/DL — SIGNIFICANT CHANGE UP (ref 70–99)
GLUCOSE BLDC GLUCOMTR-MCNC: 83 MG/DL — SIGNIFICANT CHANGE UP (ref 70–99)
GLUCOSE BLDC GLUCOMTR-MCNC: 90 MG/DL — SIGNIFICANT CHANGE UP (ref 70–99)
GLUCOSE SERPL-MCNC: 70 MG/DL — SIGNIFICANT CHANGE UP (ref 70–99)
HCT VFR BLD CALC: 38.3 % — LOW (ref 39–50)
HGB BLD-MCNC: 12.8 G/DL — LOW (ref 13–17)
MAGNESIUM SERPL-MCNC: 2.4 MG/DL — SIGNIFICANT CHANGE UP (ref 1.6–2.6)
MCHC RBC-ENTMCNC: 27.5 PG — SIGNIFICANT CHANGE UP (ref 27–34)
MCHC RBC-ENTMCNC: 33.4 G/DL — SIGNIFICANT CHANGE UP (ref 32–36)
MCV RBC AUTO: 82.4 FL — SIGNIFICANT CHANGE UP (ref 80–100)
NRBC BLD AUTO-RTO: 0 /100 WBCS — SIGNIFICANT CHANGE UP (ref 0–0)
PHOSPHATE SERPL-MCNC: 2.8 MG/DL — SIGNIFICANT CHANGE UP (ref 2.5–4.5)
PLATELET # BLD AUTO: 236 K/UL — SIGNIFICANT CHANGE UP (ref 150–400)
POTASSIUM SERPL-MCNC: 4.7 MMOL/L — SIGNIFICANT CHANGE UP (ref 3.5–5.3)
POTASSIUM SERPL-SCNC: 4.7 MMOL/L — SIGNIFICANT CHANGE UP (ref 3.5–5.3)
RBC # BLD: 4.65 M/UL — SIGNIFICANT CHANGE UP (ref 4.2–5.8)
RBC # FLD: 15.2 % — HIGH (ref 10.3–14.5)
SODIUM SERPL-SCNC: 134 MMOL/L — LOW (ref 135–145)
WBC # BLD: 8.32 K/UL — SIGNIFICANT CHANGE UP (ref 3.8–10.5)
WBC # FLD AUTO: 8.32 K/UL — SIGNIFICANT CHANGE UP (ref 3.8–10.5)

## 2025-07-17 RX ORDER — INSULIN LISPRO 100 U/ML
INJECTION, SOLUTION INTRAVENOUS; SUBCUTANEOUS
Refills: 0 | Status: DISCONTINUED | OUTPATIENT
Start: 2025-07-17 | End: 2025-07-19

## 2025-07-17 RX ORDER — SODIUM CHLORIDE 9 G/1000ML
1000 INJECTION, SOLUTION INTRAVENOUS
Refills: 0 | Status: DISCONTINUED | OUTPATIENT
Start: 2025-07-17 | End: 2025-07-19

## 2025-07-17 RX ORDER — INSULIN LISPRO 100 U/ML
INJECTION, SOLUTION INTRAVENOUS; SUBCUTANEOUS AT BEDTIME
Refills: 0 | Status: DISCONTINUED | OUTPATIENT
Start: 2025-07-17 | End: 2025-07-19

## 2025-07-17 RX ORDER — DEXTROSE 50 % IN WATER 50 %
25 SYRINGE (ML) INTRAVENOUS ONCE
Refills: 0 | Status: DISCONTINUED | OUTPATIENT
Start: 2025-07-17 | End: 2025-07-19

## 2025-07-17 RX ORDER — DEXTROSE 50 % IN WATER 50 %
12.5 SYRINGE (ML) INTRAVENOUS ONCE
Refills: 0 | Status: DISCONTINUED | OUTPATIENT
Start: 2025-07-17 | End: 2025-07-19

## 2025-07-17 RX ORDER — SODIUM CHLORIDE 9 G/1000ML
1000 INJECTION, SOLUTION INTRAVENOUS
Refills: 0 | Status: DISCONTINUED | OUTPATIENT
Start: 2025-07-17 | End: 2025-07-18

## 2025-07-17 RX ORDER — DEXTROSE 50 % IN WATER 50 %
15 SYRINGE (ML) INTRAVENOUS ONCE
Refills: 0 | Status: DISCONTINUED | OUTPATIENT
Start: 2025-07-17 | End: 2025-07-19

## 2025-07-17 RX ORDER — GLUCAGON 3 MG/1
1 POWDER NASAL ONCE
Refills: 0 | Status: DISCONTINUED | OUTPATIENT
Start: 2025-07-17 | End: 2025-07-19

## 2025-07-17 RX ADMIN — Medication 40 MILLIGRAM(S): at 13:15

## 2025-07-17 RX ADMIN — HEPARIN SODIUM 5000 UNIT(S): 1000 INJECTION INTRAVENOUS; SUBCUTANEOUS at 05:52

## 2025-07-17 RX ADMIN — Medication 5 MILLIGRAM(S): at 05:50

## 2025-07-17 RX ADMIN — HEPARIN SODIUM 5000 UNIT(S): 1000 INJECTION INTRAVENOUS; SUBCUTANEOUS at 00:16

## 2025-07-17 RX ADMIN — Medication 10 MILLIGRAM(S): at 17:57

## 2025-07-17 RX ADMIN — SODIUM CHLORIDE 125 MILLILITER(S): 9 INJECTION, SOLUTION INTRAVENOUS at 05:57

## 2025-07-17 RX ADMIN — Medication 10 MILLIGRAM(S): at 00:16

## 2025-07-17 RX ADMIN — Medication 10 MILLIGRAM(S): at 13:15

## 2025-07-17 RX ADMIN — HEPARIN SODIUM 5000 UNIT(S): 1000 INJECTION INTRAVENOUS; SUBCUTANEOUS at 21:53

## 2025-07-17 RX ADMIN — Medication 10 MILLIGRAM(S): at 05:52

## 2025-07-18 ENCOUNTER — TRANSCRIPTION ENCOUNTER (OUTPATIENT)
Age: 75
End: 2025-07-18

## 2025-07-18 LAB
GLUCOSE BLDC GLUCOMTR-MCNC: 103 MG/DL — HIGH (ref 70–99)
GLUCOSE BLDC GLUCOMTR-MCNC: 113 MG/DL — HIGH (ref 70–99)
GLUCOSE BLDC GLUCOMTR-MCNC: 118 MG/DL — HIGH (ref 70–99)
GLUCOSE BLDC GLUCOMTR-MCNC: 126 MG/DL — HIGH (ref 70–99)

## 2025-07-18 RX ORDER — AMLODIPINE BESYLATE 10 MG/1
5 TABLET ORAL DAILY
Refills: 0 | Status: DISCONTINUED | OUTPATIENT
Start: 2025-07-18 | End: 2025-07-19

## 2025-07-18 RX ADMIN — HEPARIN SODIUM 5000 UNIT(S): 1000 INJECTION INTRAVENOUS; SUBCUTANEOUS at 17:45

## 2025-07-18 RX ADMIN — Medication 10 MILLIGRAM(S): at 05:34

## 2025-07-18 RX ADMIN — Medication 10 MILLIGRAM(S): at 00:11

## 2025-07-18 RX ADMIN — Medication 40 MILLIGRAM(S): at 12:23

## 2025-07-18 RX ADMIN — HEPARIN SODIUM 5000 UNIT(S): 1000 INJECTION INTRAVENOUS; SUBCUTANEOUS at 05:34

## 2025-07-19 VITALS
DIASTOLIC BLOOD PRESSURE: 73 MMHG | SYSTOLIC BLOOD PRESSURE: 161 MMHG | HEART RATE: 58 BPM | OXYGEN SATURATION: 97 % | RESPIRATION RATE: 18 BRPM | TEMPERATURE: 98 F

## 2025-07-19 LAB
ANION GAP SERPL CALC-SCNC: 4 MMOL/L — LOW (ref 5–17)
BUN SERPL-MCNC: 8 MG/DL — SIGNIFICANT CHANGE UP (ref 7–18)
CALCIUM SERPL-MCNC: 7.9 MG/DL — LOW (ref 8.4–10.5)
CHLORIDE SERPL-SCNC: 105 MMOL/L — SIGNIFICANT CHANGE UP (ref 96–108)
CO2 SERPL-SCNC: 30 MMOL/L — SIGNIFICANT CHANGE UP (ref 22–31)
CREAT SERPL-MCNC: 0.96 MG/DL — SIGNIFICANT CHANGE UP (ref 0.5–1.3)
EGFR: 82 ML/MIN/1.73M2 — SIGNIFICANT CHANGE UP
EGFR: 82 ML/MIN/1.73M2 — SIGNIFICANT CHANGE UP
GLUCOSE BLDC GLUCOMTR-MCNC: 106 MG/DL — HIGH (ref 70–99)
GLUCOSE SERPL-MCNC: 103 MG/DL — HIGH (ref 70–99)
HCT VFR BLD CALC: 38.7 % — LOW (ref 39–50)
HGB BLD-MCNC: 12.9 G/DL — LOW (ref 13–17)
MCHC RBC-ENTMCNC: 27.3 PG — SIGNIFICANT CHANGE UP (ref 27–34)
MCHC RBC-ENTMCNC: 33.3 G/DL — SIGNIFICANT CHANGE UP (ref 32–36)
MCV RBC AUTO: 81.8 FL — SIGNIFICANT CHANGE UP (ref 80–100)
NRBC BLD AUTO-RTO: 0 /100 WBCS — SIGNIFICANT CHANGE UP (ref 0–0)
PLATELET # BLD AUTO: 270 K/UL — SIGNIFICANT CHANGE UP (ref 150–400)
POTASSIUM SERPL-MCNC: 3.5 MMOL/L — SIGNIFICANT CHANGE UP (ref 3.5–5.3)
POTASSIUM SERPL-SCNC: 3.5 MMOL/L — SIGNIFICANT CHANGE UP (ref 3.5–5.3)
RBC # BLD: 4.73 M/UL — SIGNIFICANT CHANGE UP (ref 4.2–5.8)
RBC # FLD: 15 % — HIGH (ref 10.3–14.5)
SODIUM SERPL-SCNC: 139 MMOL/L — SIGNIFICANT CHANGE UP (ref 135–145)
WBC # BLD: 7.01 K/UL — SIGNIFICANT CHANGE UP (ref 3.8–10.5)
WBC # FLD AUTO: 7.01 K/UL — SIGNIFICANT CHANGE UP (ref 3.8–10.5)

## 2025-07-19 PROCEDURE — 82947 ASSAY GLUCOSE BLOOD QUANT: CPT

## 2025-07-19 PROCEDURE — 83615 LACTATE (LD) (LDH) ENZYME: CPT

## 2025-07-19 PROCEDURE — 85610 PROTHROMBIN TIME: CPT

## 2025-07-19 PROCEDURE — 83690 ASSAY OF LIPASE: CPT

## 2025-07-19 PROCEDURE — 74018 RADEX ABDOMEN 1 VIEW: CPT

## 2025-07-19 PROCEDURE — 99238 HOSP IP/OBS DSCHRG MGMT 30/<: CPT

## 2025-07-19 PROCEDURE — 82803 BLOOD GASES ANY COMBINATION: CPT

## 2025-07-19 PROCEDURE — 85025 COMPLETE CBC W/AUTO DIFF WBC: CPT

## 2025-07-19 PROCEDURE — 83036 HEMOGLOBIN GLYCOSYLATED A1C: CPT

## 2025-07-19 PROCEDURE — 84132 ASSAY OF SERUM POTASSIUM: CPT

## 2025-07-19 PROCEDURE — 82962 GLUCOSE BLOOD TEST: CPT

## 2025-07-19 PROCEDURE — 93005 ELECTROCARDIOGRAM TRACING: CPT

## 2025-07-19 PROCEDURE — 74177 CT ABD & PELVIS W/CONTRAST: CPT

## 2025-07-19 PROCEDURE — 80053 COMPREHEN METABOLIC PANEL: CPT

## 2025-07-19 PROCEDURE — 96374 THER/PROPH/DIAG INJ IV PUSH: CPT

## 2025-07-19 PROCEDURE — 84295 ASSAY OF SERUM SODIUM: CPT

## 2025-07-19 PROCEDURE — 85027 COMPLETE CBC AUTOMATED: CPT

## 2025-07-19 PROCEDURE — 85730 THROMBOPLASTIN TIME PARTIAL: CPT

## 2025-07-19 PROCEDURE — 80048 BASIC METABOLIC PNL TOTAL CA: CPT

## 2025-07-19 PROCEDURE — 84100 ASSAY OF PHOSPHORUS: CPT

## 2025-07-19 PROCEDURE — 83735 ASSAY OF MAGNESIUM: CPT

## 2025-07-19 PROCEDURE — 74250 X-RAY XM SM INT 1CNTRST STD: CPT

## 2025-07-19 PROCEDURE — 83605 ASSAY OF LACTIC ACID: CPT

## 2025-07-19 PROCEDURE — 71045 X-RAY EXAM CHEST 1 VIEW: CPT

## 2025-07-19 PROCEDURE — 86850 RBC ANTIBODY SCREEN: CPT

## 2025-07-19 PROCEDURE — 82330 ASSAY OF CALCIUM: CPT

## 2025-07-19 PROCEDURE — 36415 COLL VENOUS BLD VENIPUNCTURE: CPT

## 2025-07-19 PROCEDURE — 96375 TX/PRO/DX INJ NEW DRUG ADDON: CPT

## 2025-07-19 PROCEDURE — 99285 EMERGENCY DEPT VISIT HI MDM: CPT | Mod: 25

## 2025-07-19 PROCEDURE — 86901 BLOOD TYPING SEROLOGIC RH(D): CPT

## 2025-07-19 PROCEDURE — 86900 BLOOD TYPING SEROLOGIC ABO: CPT

## 2025-07-19 RX ADMIN — HEPARIN SODIUM 5000 UNIT(S): 1000 INJECTION INTRAVENOUS; SUBCUTANEOUS at 00:11

## 2025-07-19 RX ADMIN — AMLODIPINE BESYLATE 5 MILLIGRAM(S): 10 TABLET ORAL at 06:05

## 2025-07-22 PROBLEM — K56.609 UNSPECIFIED INTESTINAL OBSTRUCTION, UNSPECIFIED AS TO PARTIAL VERSUS COMPLETE OBSTRUCTION: Chronic | Status: ACTIVE | Noted: 2025-07-11

## 2025-07-22 PROBLEM — E11.9 TYPE 2 DIABETES MELLITUS WITHOUT COMPLICATIONS: Chronic | Status: ACTIVE | Noted: 2025-07-11

## 2025-07-22 PROBLEM — I10 ESSENTIAL (PRIMARY) HYPERTENSION: Chronic | Status: ACTIVE | Noted: 2025-07-11

## 2025-07-30 ENCOUNTER — APPOINTMENT (OUTPATIENT)
Dept: SURGERY | Facility: CLINIC | Age: 75
End: 2025-07-30
Payer: MEDICARE

## 2025-07-30 VITALS
HEIGHT: 64 IN | HEART RATE: 71 BPM | DIASTOLIC BLOOD PRESSURE: 83 MMHG | OXYGEN SATURATION: 98 % | SYSTOLIC BLOOD PRESSURE: 156 MMHG | WEIGHT: 137 LBS | BODY MASS INDEX: 23.39 KG/M2

## 2025-07-30 DIAGNOSIS — Z90.49 ACQUIRED ABSENCE OF OTHER SPECIFIED PARTS OF DIGESTIVE TRACT: ICD-10-CM

## 2025-07-30 PROCEDURE — 99214 OFFICE O/P EST MOD 30 MIN: CPT

## (undated) DEVICE — WARMING BLANKET FULL ADULT

## (undated) DEVICE — PACK IV START WITH CHG

## (undated) DEVICE — KIT ENDO PROCEDURE CUST W/VLV

## (undated) DEVICE — LIGASURE IMPACT

## (undated) DEVICE — LABELS BLANK W PEN

## (undated) DEVICE — SOLIDIFIER ISOLYZER 2000CC

## (undated) DEVICE — VENODYNE/SCD SLEEVE CALF MEDIUM

## (undated) DEVICE — SOL IRR POUR H2O 1500ML

## (undated) DEVICE — BITE BLOCK SCOPE SAVER 20X27MM ADULT GREEN

## (undated) DEVICE — FORMALIN CONTAINER MED

## (undated) DEVICE — NDL HYPO SAFE 22G X 1" (BLACK)

## (undated) DEVICE — DRSG PREVENA PLUS SYSTEM

## (undated) DEVICE — TUBING MEDI-VAC W MAXIGRIP CONNECTORS 1/4"X6'

## (undated) DEVICE — DRSG CURITY GAUZE SPONGE 4 X 4" 12-PLY NON-STERILE

## (undated) DEVICE — ELCTR GROUNDING PAD ADULT COVIDIEN

## (undated) DEVICE — TUBING SUCTION NONCONDUCTIVE 6MM X 12FT

## (undated) DEVICE — MASK OXYGEN PANORAMIC

## (undated) DEVICE — DRAPE LAPAROTOMY W POUCHES

## (undated) DEVICE — DRAIN JACKSON PRATT 10MM FLAT FULL NO TROCAR

## (undated) DEVICE — SUT VICRYL 3-0 18" SH UNDYED (POP-OFF)

## (undated) DEVICE — CATH IV SAFE BC 22G X 1" (BLUE)

## (undated) DEVICE — SOL IRR POUR NS 0.9% 1500ML

## (undated) DEVICE — SUT SOFSILK 2-0 30" V-20

## (undated) DEVICE — SUT SOFSILK 0 18" TIES

## (undated) DEVICE — FOLEY TRAY 16FR SURESTEP LTX BG

## (undated) DEVICE — SOLIDIFIER CANN EXPRESS 3K

## (undated) DEVICE — ELCTR BOVIE BLADE 3/4" EXTENDED LENGTH 6"

## (undated) DEVICE — SUT SILK 0 30" TIES

## (undated) DEVICE — DVC INFLATION CRE STERIFLATE

## (undated) DEVICE — Device

## (undated) DEVICE — DRAPE LIGHT HANDLE COVER (BLUE)

## (undated) DEVICE — SUCTION YANKAUER OPEN TIP NO VENT CURVE

## (undated) DEVICE — DRSG MASTISOL

## (undated) DEVICE — PROBE FIAPC CIRC O.D. 2.3MM/6.9FR LNTH 220CM/7.2FT

## (undated) DEVICE — GLV 7.5 PROTEXIS (WHITE)

## (undated) DEVICE — STAPLER SKIN VISI-STAT 35 WIDE

## (undated) DEVICE — BITE BLOCK ADULT 20 X 27MM (GREEN)

## (undated) DEVICE — SOL INJ NS 0.9% 500ML 1-PORT

## (undated) DEVICE — DRSG MEDIPORE DRESS IT 5-7/8 X 11"

## (undated) DEVICE — WARMING BLANKET FULL UNDERBODY

## (undated) DEVICE — TUBING CANNULA SALTER LABS NASAL ADULT 7FT

## (undated) DEVICE — VALVE ENDOSCOPE DEFENDO SINGLE USE

## (undated) DEVICE — LAP PAD W RING 18 X 18"

## (undated) DEVICE — GOWN XL

## (undated) DEVICE — GOWN LG

## (undated) DEVICE — DRAPE 1/2 SHEET 40X57"

## (undated) DEVICE — GLV 7 PROTEXIS (WHITE)

## (undated) DEVICE — DRAIN RESERVOIR FOR JACKSON PRATT 100CC CARDINAL

## (undated) DEVICE — BITE BLOCK MOUTHPCW/STRAP

## (undated) DEVICE — SYR ASEPTO

## (undated) DEVICE — PACK MINOR NO DRAPE

## (undated) DEVICE — SYR LUER LOK 3CC

## (undated) DEVICE — ANESTHESIA CIRCUIT ADULT HMEF

## (undated) DEVICE — DRSG TRACH DRAINAGE 4X4

## (undated) DEVICE — ELCTR BOVIE TIP BLADE INSULATED 6.5" EDGE

## (undated) DEVICE — WRAP COMPRESSION CALF MED

## (undated) DEVICE — DRSG CURITY GAUZE SPONGE 4 X 4" 12-PLY

## (undated) DEVICE — PACK MAJOR ABDOMINAL WITH LAP

## (undated) DEVICE — FOR-ESU VALLEYLAB T7E15008DX: Type: DURABLE MEDICAL EQUIPMENT

## (undated) DEVICE — SUT SOFSILK 2-0 18" C-15

## (undated) DEVICE — ELCTR BOVIE TIP CLEANER SCRATCH PAD 1 7/8 X 1 7/8"

## (undated) DEVICE — TUBING IV SET GRAVITY 1Y 78" MACRO

## (undated) DEVICE — SALIVA EJECTOR (BLUE)

## (undated) DEVICE — DRSG 2X2

## (undated) DEVICE — ELCTR BOVIE PENCIL BLADE 10FT

## (undated) DEVICE — SUT MAXON 1 96" T-60

## (undated) DEVICE — SOL IRR POUR NS 0.9% 500ML

## (undated) DEVICE — CONTAINER FORMALIN 10% 20ML

## (undated) DEVICE — PD GROUND 2 SURFIT AD LF

## (undated) DEVICE — WARMING BLANKET UPPER ADULT

## (undated) DEVICE — SUT SOFSILK 3-0 30" V-20

## (undated) DEVICE — UNDERPAD LINEN SAVER 17 X 24"

## (undated) DEVICE — PREP CHLORAPREP HI-LITE ORANGE 26ML

## (undated) DEVICE — DENTURE CUP PINK